# Patient Record
Sex: FEMALE | NOT HISPANIC OR LATINO | Employment: OTHER | URBAN - METROPOLITAN AREA
[De-identification: names, ages, dates, MRNs, and addresses within clinical notes are randomized per-mention and may not be internally consistent; named-entity substitution may affect disease eponyms.]

---

## 2022-08-15 PROBLEM — H04.129 DRY EYE SYNDROME: Status: ACTIVE | Noted: 2020-02-03

## 2022-08-15 PROBLEM — H25.13 AGE-RELATED NUCLEAR CATARACT OF BOTH EYES: Status: ACTIVE | Noted: 2020-02-03

## 2022-08-15 PROBLEM — M35.01 KERATOCONJUNCTIVITIS SICCA: Status: ACTIVE | Noted: 2020-02-03

## 2022-08-15 PROBLEM — H52.03 HYPEROPIA OF BOTH EYES: Status: ACTIVE | Noted: 2020-02-03

## 2022-08-15 PROBLEM — H16.229 KERATOCONJUNCTIVITIS SICCA: Status: ACTIVE | Noted: 2020-02-03

## 2022-08-15 PROBLEM — H52.4 PRESBYOPIA: Status: ACTIVE | Noted: 2020-02-03

## 2022-08-15 PROBLEM — E78.3 EXOGENOUS HYPERTRIGLYCERIDEMIA: Status: ACTIVE | Noted: 2017-06-05

## 2022-08-15 RX ORDER — CLOBETASOL PROPIONATE 0.5 MG/G
OINTMENT TOPICAL
COMMUNITY
End: 2022-08-18

## 2022-08-15 RX ORDER — LISINOPRIL 40 MG/1
1 TABLET ORAL DAILY
COMMUNITY
Start: 2022-06-27

## 2022-08-15 RX ORDER — IBUPROFEN 600 MG/1
600 TABLET ORAL EVERY 6 HOURS PRN
COMMUNITY
End: 2023-01-21

## 2022-08-15 RX ORDER — CHROMIUM 200 MCG
200 TABLET ORAL
COMMUNITY
End: 2022-08-18

## 2022-08-15 RX ORDER — ASPIRIN 81 MG/1
81 TABLET ORAL DAILY
COMMUNITY

## 2022-08-15 RX ORDER — ESTRADIOL ACETATE 0.05 MG/D
1 RING VAGINAL
COMMUNITY
Start: 2022-04-01

## 2022-08-15 RX ORDER — MELATONIN
1000 DAILY
COMMUNITY

## 2022-08-18 ENCOUNTER — OFFICE VISIT (OUTPATIENT)
Dept: SURGERY | Facility: CLINIC | Age: 62
End: 2022-08-18

## 2022-08-18 VITALS
SYSTOLIC BLOOD PRESSURE: 134 MMHG | HEART RATE: 75 BPM | OXYGEN SATURATION: 98 % | DIASTOLIC BLOOD PRESSURE: 86 MMHG | WEIGHT: 211.2 LBS | HEIGHT: 63 IN | TEMPERATURE: 98.4 F | BODY MASS INDEX: 37.42 KG/M2

## 2022-08-18 DIAGNOSIS — K64.9 HEMORRHOIDS, UNSPECIFIED HEMORRHOID TYPE: ICD-10-CM

## 2022-08-18 DIAGNOSIS — K64.4 ANAL SKIN TAG: Primary | ICD-10-CM

## 2022-08-18 DIAGNOSIS — Z12.11 ENCOUNTER FOR SCREENING COLONOSCOPY: ICD-10-CM

## 2022-08-18 PROCEDURE — 99204 OFFICE O/P NEW MOD 45 MIN: CPT | Performed by: PHYSICIAN ASSISTANT

## 2022-08-18 RX ORDER — ALBUTEROL SULFATE 90 UG/1
2 AEROSOL, METERED RESPIRATORY (INHALATION) EVERY 4 HOURS PRN
COMMUNITY
Start: 2022-06-03

## 2022-08-18 RX ORDER — HYDROCORTISONE 25 MG/G
CREAM TOPICAL
Qty: 30 G | Refills: 1 | Status: SHIPPED | OUTPATIENT
Start: 2022-08-18 | End: 2022-11-22

## 2022-08-18 RX ORDER — LANCETS 33 GAUGE
1 EACH MISCELLANEOUS DAILY
COMMUNITY
Start: 2022-06-07

## 2022-08-18 RX ORDER — BLOOD SUGAR DIAGNOSTIC
1 STRIP MISCELLANEOUS DAILY
COMMUNITY
Start: 2022-06-07

## 2022-08-18 NOTE — PROGRESS NOTES
Alisha Connolly is a 62 y.o. female who is seen as a self-referred consult for rectal pain.      HPI:  Started May 2021. Was seen at [facility], was prescribed diltiazem, which only burned and did not help. Tried that for a long time but kept being irritated. Pain is worse after BMs. Was told by another doctor to stop diltiazem and start calmoseptine, which helped. Metamucil didn't help; benefiber was better. Smooth move tea has been wonderful. Then calmoseptine started irritating. Now using vaseline after each BM and after showering. Has hemorrhoids from childbirth. Thinks she has an internal hemorrhoid on the right side. Has twinges of sharp pain. Sometimes radiates to vaginal area.  Skin tags irritate her and pull on extra tissue, which is uncomfortable. Symptoms have improved somewhat but have not resolved. She does not have regular bleeding. Has BM 1-2x/day. [place] 4. No family history of colon cancer or colon polyps. Her last colonoscopy was at least 10 years ago. No personal history of colon polyps.     Past Medical History:   Diagnosis Date   • Abnormal Pap smear of cervix    • Age-related nuclear cataract of both eyes 02/03/2020   • Anal fissure 10/2021   • Arthrodesis status 03/08/2014   • Asthma    • Benign essential hypertension 06/05/2015    Continue lisinopril   • Carpal tunnel syndrome    • Cervical spondylolysis    • Chronic neck pain 07/19/2016   • Cystitis 11/2017   • Dry eye syndrome 02/03/2020   • Environmental allergies    • Exogenous hypertriglyceridemia 06/05/2017   • Ganglion cyst of finger 01/2020   • Hormone replacement therapy (HRT)    • Hyperchylomicronemia    • Hyperlipidemia 06/05/2015    ASCVD 10-year risk ~10% Plan: · Start statin. Unclear whether arm numbness really 2/2 statin in the past.   • Hyperopia of both eyes 02/03/2020   • Keratoconjunctivitis sicca (HCC) 02/03/2020   • Lichen sclerosus    • Lung nodule 06/01/2006   • Obstructive sleep apnea syndrome 11/01/2004    Not using CPAP  due to eye issues   • Palpitations    • Pertussis 02/08/2016    [facility] ER   • PNA (pneumonia)    • Presbyopia 02/03/2020   • Rotator cuff injury 12/04/2011    SEEN AT [facility] ER   • Snoring    • Type 2 diabetes mellitus without complication (HCC) 12/21/2015    Under good control Plan: · Reassured about use of metformin   • Urinary urgency 06/2021       Past Surgical History:   Procedure Laterality Date   • ANTERIOR CERVICAL DISCECTOMY W/ FUSION N/A 03/08/2014    C6-7, DR. ALEXEY IZAGUIRRE AT [facility]   • CERVICAL SPINE SURGERY N/A 2003    TITANIUM PLATE   • COLONOSCOPY N/A 2021   • HYSTERECTOMY N/A 1997    WITH BSO, FOR HEAVY BLEEDING   • INCISIONAL BREAST BIOPSY Right 1980    BENIGN   • LAPAROSCOPIC LYSIS OF ADHESIONS N/A    • Z-PLASTY REPAIR         Social History:   reports that she quit smoking about 8 years ago. Her smoking use included cigarettes. She has a 1.13 pack-year smoking history. She has never used smokeless tobacco. She reports current alcohol use. She reports that she does not use drugs.      Marriage status:     Family History   Problem Relation Age of Onset   • Arthritis Mother    • Heart disease Mother    • Arrhythmia Mother    • Hyperlipidemia Father    • Diabetes Father    • Heart disease Father    • Coronary artery disease Father    • Diabetes Sister    • Diabetes Paternal Grandmother    • Cancer Cousin    • Breast cancer Cousin    • Cancer Maternal Great-Grandmother    • Ovarian cancer Maternal Great-Grandmother          Current Outpatient Medications:   •  albuterol sulfate  (90 Base) MCG/ACT inhaler, , Disp: , Rfl:   •  aspirin 81 MG EC tablet, Take 81 mg by mouth., Disp: , Rfl:   •  cholecalciferol (VITAMIN D3) 25 MCG (1000 UT) tablet, Take 1,000 Units by mouth Daily., Disp: , Rfl:   •  DHA-EPA-Vitamin E 192-251-11 MG-MG-UNIT capsule, Take 1 tablet by mouth., Disp: , Rfl:   •  Estradiol Acetate (Femring) 0.05 MG/24HR ring, Insert 1 each into the vagina., Disp: , Rfl:   •   ibuprofen (ADVIL,MOTRIN) 600 MG tablet, Take  by mouth., Disp: , Rfl:   •  Lancets (OneTouch Delica Plus [id number]) misc, 1 each by Other route Daily., Disp: , Rfl:   •  lisinopril (PRINIVIL,ZESTRIL) 40 MG tablet, Take 1 tablet by mouth Daily., Disp: , Rfl:   •  metFORMIN (GLUCOPHAGE) 500 MG tablet, Take 1 tablet by mouth 2 (Two) Times a Day With Meals., Disp: , Rfl:   •  OneTouch Ultra test strip, 1 each by Other route Daily., Disp: , Rfl:   •  Hydrocortisone, Perianal, (Anusol-HC) 2.5 % rectal cream, Apply to enlarged hemorrhoids 3 times daily during hemorrhoid flare.  Include applicator. Not for daily long term use., Disp: 30 g, Rfl: 1    Allergy  Penicillins; Atorvastatin; Rosuvastatin; Latex; and Tetanus toxoid, adsorbed    Vitals:    08/18/22 1423   BP: 134/86   Pulse: 75   Temp: 98.4 °F (36.9 °C)   SpO2: 98%   --  Body mass index is 37.41 kg/m².    Physical Exam  Enlarged external hemorrhoids with several skin tags  Mild to moderate pain on JASSI, no masses palpated    Assessment:  1. Anal skin tag    2. Encounter for screening colonoscopy    3. Hemorrhoids, unspecified hemorrhoid type        Plan:  For the hemorrhoids, I advised the patient to start a fiber supplement to a daily total intake of 30 to 40 g for stool consistency.  I provided detailed instructions.  I encouraged adequate water intake and MiraLAX as needed to keep stools soft.  I prescribed Anusol cream for hemorrhoids and gave instructions for use, including that cream is not for daily long-term use. Follow up in 4-6 weeks for reevaluation. Call with any questions, concerns, or worsening symptoms.    Discussed possibility of coexistent anal fissure along with hemorrhoids.     Schedule Colonoscopy, possible rubber banding, possible anal tag excision. Discussed risks, benefits, and alternatives.    I spent >45 minutes caring for this patient on this date of service. This time includes time spent by me performing any of the following activities:  preparing for the visit, reviewing tests, obtaining and/or reviewing a separately obtained history, performing a medically appropriate examination and/or evaluation, counseling and educating the patient/family/caregiver, ordering medications, tests, or procedures, referring and communicating with other health care professionals and independently interpreting results and communicating that information with the patient/family/caregiver.      Rosalind Richard PA-C  Physician Assistant  Colorectal Surgery

## 2022-09-19 ENCOUNTER — ANESTHESIA EVENT (OUTPATIENT)
Dept: GASTROENTEROLOGY | Facility: HOSPITAL | Age: 62
End: 2022-09-19

## 2022-09-19 ENCOUNTER — ANESTHESIA (OUTPATIENT)
Dept: GASTROENTEROLOGY | Facility: HOSPITAL | Age: 62
End: 2022-09-19

## 2022-09-19 ENCOUNTER — HOSPITAL ENCOUNTER (OUTPATIENT)
Facility: HOSPITAL | Age: 62
Setting detail: HOSPITAL OUTPATIENT SURGERY
Discharge: HOME | End: 2022-09-19
Attending: COLON & RECTAL SURGERY | Admitting: COLON & RECTAL SURGERY

## 2022-09-19 VITALS
TEMPERATURE: 98.1 F | DIASTOLIC BLOOD PRESSURE: 62 MMHG | BODY MASS INDEX: 34.49 KG/M2 | RESPIRATION RATE: 16 BRPM | SYSTOLIC BLOOD PRESSURE: 144 MMHG | HEART RATE: 79 BPM | OXYGEN SATURATION: 97 % | WEIGHT: 202 LBS | HEIGHT: 64 IN

## 2022-09-19 DIAGNOSIS — K64.4 ANAL SKIN TAG: ICD-10-CM

## 2022-09-19 DIAGNOSIS — K64.2 GRADE III HEMORRHOIDS: Primary | ICD-10-CM

## 2022-09-19 DIAGNOSIS — Z12.11 ENCOUNTER FOR SCREENING COLONOSCOPY: ICD-10-CM

## 2022-09-19 DIAGNOSIS — K64.9 HEMORRHOIDS, UNSPECIFIED HEMORRHOID TYPE: ICD-10-CM

## 2022-09-19 LAB — GLUCOSE BLDC GLUCOMTR-MCNC: 106 MG/DL (ref 70–130)

## 2022-09-19 PROCEDURE — 82962 GLUCOSE BLOOD TEST: CPT

## 2022-09-19 PROCEDURE — 45398 COLONOSCOPY W/BAND LIGATION: CPT | Performed by: COLON & RECTAL SURGERY

## 2022-09-19 PROCEDURE — 88304 TISSUE EXAM BY PATHOLOGIST: CPT | Performed by: COLON & RECTAL SURGERY

## 2022-09-19 PROCEDURE — 25010000002 PROPOFOL 10 MG/ML EMULSION

## 2022-09-19 PROCEDURE — 25010000002 KETOROLAC TROMETHAMINE PER 15 MG

## 2022-09-19 RX ORDER — LIDOCAINE HYDROCHLORIDE 10 MG/ML
0.5 INJECTION, SOLUTION INFILTRATION; PERINEURAL ONCE AS NEEDED
Status: DISCONTINUED | OUTPATIENT
Start: 2022-09-19 | End: 2022-09-19 | Stop reason: HOSPADM

## 2022-09-19 RX ORDER — KETOROLAC TROMETHAMINE 30 MG/ML
INJECTION, SOLUTION INTRAMUSCULAR; INTRAVENOUS AS NEEDED
Status: DISCONTINUED | OUTPATIENT
Start: 2022-09-19 | End: 2022-09-19 | Stop reason: SURG

## 2022-09-19 RX ORDER — TRAMADOL HYDROCHLORIDE 50 MG/1
50 TABLET ORAL EVERY 6 HOURS PRN
Qty: 20 TABLET | Refills: 0 | Status: SHIPPED | OUTPATIENT
Start: 2022-09-19 | End: 2022-11-22

## 2022-09-19 RX ORDER — PROPOFOL 10 MG/ML
VIAL (ML) INTRAVENOUS CONTINUOUS PRN
Status: DISCONTINUED | OUTPATIENT
Start: 2022-09-19 | End: 2022-09-19 | Stop reason: SURG

## 2022-09-19 RX ORDER — PROPOFOL 10 MG/ML
VIAL (ML) INTRAVENOUS AS NEEDED
Status: DISCONTINUED | OUTPATIENT
Start: 2022-09-19 | End: 2022-09-19 | Stop reason: SURG

## 2022-09-19 RX ORDER — BUPIVACAINE HYDROCHLORIDE AND EPINEPHRINE 5; 5 MG/ML; UG/ML
INJECTION, SOLUTION EPIDURAL; INTRACAUDAL; PERINEURAL AS NEEDED
Status: DISCONTINUED | OUTPATIENT
Start: 2022-09-19 | End: 2022-09-19 | Stop reason: HOSPADM

## 2022-09-19 RX ORDER — SODIUM CHLORIDE, SODIUM LACTATE, POTASSIUM CHLORIDE, CALCIUM CHLORIDE 600; 310; 30; 20 MG/100ML; MG/100ML; MG/100ML; MG/100ML
1000 INJECTION, SOLUTION INTRAVENOUS CONTINUOUS
Status: DISCONTINUED | OUTPATIENT
Start: 2022-09-19 | End: 2022-09-19 | Stop reason: HOSPADM

## 2022-09-19 RX ORDER — SODIUM CHLORIDE 0.9 % (FLUSH) 0.9 %
10 SYRINGE (ML) INJECTION AS NEEDED
Status: DISCONTINUED | OUTPATIENT
Start: 2022-09-19 | End: 2022-09-19 | Stop reason: HOSPADM

## 2022-09-19 RX ORDER — PROMETHAZINE HYDROCHLORIDE 25 MG/1
25 SUPPOSITORY RECTAL ONCE AS NEEDED
Status: DISCONTINUED | OUTPATIENT
Start: 2022-09-19 | End: 2022-09-19 | Stop reason: HOSPADM

## 2022-09-19 RX ORDER — PROMETHAZINE HYDROCHLORIDE 25 MG/1
25 TABLET ORAL ONCE AS NEEDED
Status: DISCONTINUED | OUTPATIENT
Start: 2022-09-19 | End: 2022-09-19 | Stop reason: HOSPADM

## 2022-09-19 RX ORDER — LIDOCAINE HYDROCHLORIDE 20 MG/ML
INJECTION, SOLUTION INFILTRATION; PERINEURAL AS NEEDED
Status: DISCONTINUED | OUTPATIENT
Start: 2022-09-19 | End: 2022-09-19 | Stop reason: SURG

## 2022-09-19 RX ADMIN — PROPOFOL 50 MG: 10 INJECTION, EMULSION INTRAVENOUS at 12:56

## 2022-09-19 RX ADMIN — LIDOCAINE HYDROCHLORIDE 50 MG: 20 INJECTION, SOLUTION INFILTRATION; PERINEURAL at 12:56

## 2022-09-19 RX ADMIN — Medication 200 MCG/KG/MIN: at 12:58

## 2022-09-19 RX ADMIN — KETOROLAC TROMETHAMINE 30 MG: 30 INJECTION, SOLUTION INTRAMUSCULAR; INTRAVENOUS at 13:11

## 2022-09-19 RX ADMIN — SODIUM CHLORIDE, POTASSIUM CHLORIDE, SODIUM LACTATE AND CALCIUM CHLORIDE 1000 ML: 600; 310; 30; 20 INJECTION, SOLUTION INTRAVENOUS at 12:12

## 2022-09-19 NOTE — ANESTHESIA POSTPROCEDURE EVALUATION
"Patient: Alisha Connolly    Procedure Summary     Date: 09/19/22 Room / Location: [facility] ENDOSCOPY 1 / [facility] ENDOSCOPY    Anesthesia Start: 1251 Anesthesia Stop: 1331    Procedures:       HEMORRHOID BANDING x3 (N/A Rectum)      COLONOSCOPY to cecum (N/A )      ANAL TAG EXCISION (N/A Anus) Diagnosis:       Anal skin tag      Encounter for screening colonoscopy      Hemorrhoids, unspecified hemorrhoid type      (Anal skin tag [K64.4])      (Encounter for screening colonoscopy [Z12.11])      (Hemorrhoids, unspecified hemorrhoid type [K64.9])    Surgeons: Sarai Perez MD Provider: Mook Ch MD    Anesthesia Type: MAC ASA Status: 2          Anesthesia Type: MAC    Vitals  Vitals Value Taken Time   /68 09/19/22 1330   Temp     Pulse 81 09/19/22 1330   Resp 16 09/19/22 1330   SpO2 97 % 09/19/22 1330           Post Anesthesia Care and Evaluation    Patient location during evaluation: bedside  Patient participation: complete - patient participated  Level of consciousness: awake and alert  Pain management: adequate    Airway patency: patent  Anesthetic complications: No anesthetic complications    Cardiovascular status: acceptable  Respiratory status: acceptable  Hydration status: acceptable    Comments: /68   Pulse 81   Temp 36.7 °C (98.1 °F) (Oral)   Resp 16   Ht 162.6 cm (64\")   Wt 91.6 kg (202 lb)   SpO2 97%   BMI 34.67 kg/m²     Patient is stable postoperatively and has adequately recovered from anesthesia as described above unless otherwise noted      "

## 2022-09-19 NOTE — ANESTHESIA PREPROCEDURE EVALUATION
Anesthesia Evaluation     Patient summary reviewed and Nursing notes reviewed   no history of anesthetic complications:               Airway   Mallampati: II  TM distance: >3 FB  Neck ROM: full  no difficulty expected  Dental - normal exam     Pulmonary     breath sounds clear to auscultation  (+) asthma,sleep apnea,   (-) shortness of breath, decreased breath sounds, wheezes  Cardiovascular - normal exam  Exercise tolerance: good (4-7 METS)    Rhythm: regular  Rate: normal    (+) hypertension, hyperlipidemia,   (-) past MI, angina, CHF, orthopnea, PND, MA, PVD      Neuro/Psych- negative ROS  (-) seizures, neuromuscular disease, TIA, CVA, dizziness/light headedness, weakness, numbness  GI/Hepatic/Renal/Endo    (+)   diabetes mellitus type 2,   (-) liver disease    Musculoskeletal     (+) neck pain,   Abdominal  - normal exam   Substance History - negative use  (-) alcohol use, drug use     OB/GYN negative ob/gyn ROS         Other - negative ROS                       Anesthesia Plan    ASA 2     MAC     (D/W pt. MAC and possible awareness intra op.  Pt understands MAC and GA are not the same and the possibility of GA being required for failed MAC)  intravenous induction     Anesthetic plan, risks, benefits, and alternatives have been provided, discussed and informed consent has been obtained with: patient.        CODE STATUS:

## 2022-09-21 LAB
LAB AP CASE REPORT: NORMAL
PATH REPORT.FINAL DX SPEC: NORMAL
PATH REPORT.GROSS SPEC: NORMAL

## 2022-09-22 ENCOUNTER — TELEPHONE (OUTPATIENT)
Dept: SURGERY | Facility: CLINIC | Age: 62
End: 2022-09-22

## 2022-09-22 RX ORDER — LIDOCAINE 50 MG/G
1 OINTMENT TOPICAL EVERY 4 HOURS PRN
Qty: 30 G | Refills: 4 | Status: SHIPPED | OUTPATIENT
Start: 2022-09-22 | End: 2022-10-22

## 2022-09-22 NOTE — TELEPHONE ENCOUNTER
YESSYI -    Phoned patient and scheduled a post-op appt. I called the pharmacy ([facility]) & spoke with Deuce the pharmacy tech and asked to cancel the lidocaine ointment, she states that it burns her and irritates her skin, another doctor that was helping her with a fissure had told her to discontinue it, she was happy with pathology report.  Patient voiced understanding.

## 2022-09-22 NOTE — TELEPHONE ENCOUNTER
Patient phoned office asking if she should have a post-op appt?    CY, hemorrhoid banding, & 2 skin tag removals.    Can she use Tucks, she is still sore and bleeding (spots) back there, probably from the two skin tags that was removed.    She would like for you to call her to give pathology report.  Dx: Skin, Anus, Excision:  A: Hypertrophic anal papillae with fibrosis and repair.      Contact Cell #[phone].

## 2022-09-23 DIAGNOSIS — K64.2 GRADE III HEMORRHOIDS: ICD-10-CM

## 2022-09-23 DIAGNOSIS — K64.9 HEMORRHOIDS, UNSPECIFIED HEMORRHOID TYPE: ICD-10-CM

## 2022-09-23 RX ORDER — TRAMADOL HYDROCHLORIDE 50 MG/1
TABLET ORAL
Qty: 20 TABLET | Refills: 0 | OUTPATIENT
Start: 2022-09-23

## 2022-09-23 NOTE — TELEPHONE ENCOUNTER
Yes, has 4 left. Britni suggested extra strength Tylenol OTC. She states has started sitz bath. Patient voiced understanding.

## 2022-09-27 ENCOUNTER — TELEPHONE (OUTPATIENT)
Dept: SURGERY | Facility: CLINIC | Age: 62
End: 2022-09-27

## 2022-09-27 NOTE — TELEPHONE ENCOUNTER
Caller: Alisha Connolly    Relationship to patient:     Best call back number: [phone]    Patient is needing:  EXTREME PAIN WHEN PATIENT SITS, FEELS RAW AND SORE, SHE ALSO FEELS SOMETHING IS IN THERE .  PATIENT WOULD LIKE TO KNOW IF SHE CAN COME IN SOONER THAN 10.03.22 POST OP APPOINTMENT?

## 2022-09-27 NOTE — TELEPHONE ENCOUNTER
Spoke to pt, said she has a hemorrhoid on the outside. When she sits it feels like that hemorrhoid is poking on the inside. D/C and bleeding has subsided. Doing sitz baths, unable to use lidocaine because it burns,and  taking extra strength tylenol. Explained to pt that this is most likely swelling externally that is pushing against the internal hemorrhoid that is banded. Explained hemorrhoids and banding to pt. Told pt we did not have any available appts before Monday but would call her if we had a cancellation and that she was welcome to go to ER if she felt like she was unable to wait until Monday.  Also suggested to pt to alternate ibuprofen with the tylenol, use calmoseptine, and ice for comfort. Pt understood.

## 2022-10-03 ENCOUNTER — OFFICE VISIT (OUTPATIENT)
Dept: SURGERY | Facility: CLINIC | Age: 62
End: 2022-10-03

## 2022-10-03 VITALS
WEIGHT: 205.3 LBS | SYSTOLIC BLOOD PRESSURE: 140 MMHG | TEMPERATURE: 97.6 F | BODY MASS INDEX: 36.38 KG/M2 | HEART RATE: 72 BPM | HEIGHT: 63 IN | OXYGEN SATURATION: 96 % | DIASTOLIC BLOOD PRESSURE: 78 MMHG

## 2022-10-03 DIAGNOSIS — K64.2 GRADE III HEMORRHOIDS: Primary | ICD-10-CM

## 2022-10-03 DIAGNOSIS — K64.4 ANAL SKIN TAG: ICD-10-CM

## 2022-10-03 PROCEDURE — 99024 POSTOP FOLLOW-UP VISIT: CPT | Performed by: PHYSICIAN ASSISTANT

## 2022-10-03 NOTE — PROGRESS NOTES
"Alisha Connolly is a 62 y.o. female in for follow up of Grade III hemorrhoids    Anal skin tag    Pt is S/P skin tag removal + colonoscopy with RBL on 09/19/2022 and presents today for a follow up visit.   She experienced spotty bleeding x1 week and mucous drainage following her procedure, but has since resolved.   She has an occasional twinge of pain in the rectum, but overall feels pain has improved since undergoing the RBL.   Pt notes occasional left-sided perianal swelling, but denies any current symptoms.   Regular BMs since drinking Smooth Move Tea.  She denies fevers or any other concerns at this time.     /78 (BP Location: Left arm, Patient Position: Sitting, Cuff Size: Large Adult)   Pulse 72   Temp 97.6 °F (36.4 °C)   Ht 160 cm (63\")   Wt 93.1 kg (205 lb 4.8 oz)   SpO2 96%   Breastfeeding No   BMI 36.37 kg/m²   Body mass index is 36.37 kg/m².      PE:  Physical Exam  Constitutional:       General: She is not in acute distress.     Appearance: She is well-developed.   HENT:      Head: Normocephalic and atraumatic.   Abdominal:      General: There is no distension.      Palpations: Abdomen is soft.   Genitourinary:     Comments: Perianal exam: Healed perianal incisions. Anal skin tag to the left. No enlargement of external hemorrhoids.   Musculoskeletal:         General: Normal range of motion.   Neurological:      Mental Status: She is alert.   Psychiatric:         Thought Content: Thought content normal.       Review of Medical Record:    Colonoscopy 09/19/2022:  - External & Internal Hemorrhoids. S/p RBL x3.  - Anal Skin Tag. Removed. Path: Hyperplastic Anal Papillae with Fibrois   - Repeat: 10 Years  - Dr. Sarai Perez, [facility]    Assessment:   1. Grade III hemorrhoids    2. Anal skin tag    - S/P skin tag removal + colonoscopy with RBL on 09/19/2022.  - Path: Hyperplastic Anal Papillae with Fibrois     Plan:  - Pt recovering well since her procedure.   - Apply Anusol PRN for external hemorrhoid " enlargement   - Follow up PRN for any new or worsening symptoms.

## 2022-10-19 NOTE — OP NOTE
Surgeon: Sarai Perez MD        Preoperative diagnosis: Anal skin tag [K64.4]  Encounter for screening colonoscopy [Z12.11]  Hemorrhoids, unspecified hemorrhoid type [K64.9]    Post-Op Diagnosis Codes:     * Anal skin tag [K64.4]     * Encounter for screening colonoscopy [Z12.11]     * Hemorrhoids, unspecified hemorrhoid type [K64.9]    Procedure:   ANAL TAG EXCISION, * Panel 2 does not exist *    Estimated Blood Loss: minimal    Specimens:   Specimens     ID Source Type Tests Collected By Collected At Frozen?    A Anus Tissue · TISSUE PATHOLOGY EXAM   Sarai Perez MD 9/19/22 1318 No    Description: bx         Order Name Source Comment Collection Info Order Time   TISSUE PATHOLOGY EXAM Anus  Collected By: Sarai Perez MD 9/19/2022  1:18 PM     Indication:  Alisha Connolly is a 62 y.o. female who comes in with anal tag  Patient understands risks, benefits,and alternatives wishes to proceed.      Procedure Details:  Procedure: excision of anal tag    Patient gave informed consent verbally.  Patient was prepped with Betadine.  1% lidocaine with epinephrine was used as a local infiltration.  I made an elliptical incision around the tag using a scalpel.  I then removed the tag at the base with a scalpel.  I used silver nitrate to control any bleeding.  I closed the incision in a running fashion with a 2-0 Vicryl.    Patient tolerated well.

## 2022-11-22 ENCOUNTER — OFFICE VISIT (OUTPATIENT)
Dept: FAMILY MEDICINE CLINIC | Facility: CLINIC | Age: 62
End: 2022-11-22

## 2022-11-22 VITALS
DIASTOLIC BLOOD PRESSURE: 76 MMHG | HEART RATE: 77 BPM | TEMPERATURE: 97.1 F | HEIGHT: 63 IN | BODY MASS INDEX: 36.5 KG/M2 | SYSTOLIC BLOOD PRESSURE: 138 MMHG | WEIGHT: 206 LBS | OXYGEN SATURATION: 97 %

## 2022-11-22 DIAGNOSIS — E11.9 TYPE 2 DIABETES MELLITUS WITHOUT COMPLICATION, WITHOUT LONG-TERM CURRENT USE OF INSULIN: Primary | ICD-10-CM

## 2022-11-22 DIAGNOSIS — I10 BENIGN ESSENTIAL HYPERTENSION: ICD-10-CM

## 2022-11-22 DIAGNOSIS — E78.2 MIXED HYPERLIPIDEMIA: ICD-10-CM

## 2022-11-22 PROCEDURE — 99204 OFFICE O/P NEW MOD 45 MIN: CPT | Performed by: NURSE PRACTITIONER

## 2022-11-23 LAB
ALBUMIN SERPL-MCNC: 4.3 G/DL (ref 3.5–5.2)
ALBUMIN/CREAT UR: <2 MG/G CREAT (ref 0–29)
ALBUMIN/GLOB SERPL: 1.9 G/DL
ALP SERPL-CCNC: 41 U/L (ref 39–117)
ALT SERPL-CCNC: 13 U/L (ref 1–33)
AST SERPL-CCNC: 17 U/L (ref 1–32)
BASOPHILS # BLD AUTO: 0.09 10*3/MM3 (ref 0–0.2)
BASOPHILS NFR BLD AUTO: 1.2 % (ref 0–1.5)
BILIRUB SERPL-MCNC: 0.4 MG/DL (ref 0–1.2)
BUN SERPL-MCNC: 12 MG/DL (ref 8–23)
BUN/CREAT SERPL: 14.6 (ref 7–25)
CALCIUM SERPL-MCNC: 9.5 MG/DL (ref 8.6–10.5)
CHLORIDE SERPL-SCNC: 104 MMOL/L (ref 98–107)
CHOLEST SERPL-MCNC: 242 MG/DL (ref 0–200)
CO2 SERPL-SCNC: 26.4 MMOL/L (ref 22–29)
CREAT SERPL-MCNC: 0.82 MG/DL (ref 0.57–1)
CREAT UR-MCNC: 129.8 MG/DL
EGFRCR SERPLBLD CKD-EPI 2021: 80 ML/MIN/1.73
EOSINOPHIL # BLD AUTO: 0.17 10*3/MM3 (ref 0–0.4)
EOSINOPHIL NFR BLD AUTO: 2.3 % (ref 0.3–6.2)
ERYTHROCYTE [DISTWIDTH] IN BLOOD BY AUTOMATED COUNT: 13.4 % (ref 12.3–15.4)
GLOBULIN SER CALC-MCNC: 2.3 GM/DL
GLUCOSE SERPL-MCNC: 108 MG/DL (ref 65–99)
HBA1C MFR BLD: 5.7 % (ref 4.8–5.6)
HCT VFR BLD AUTO: 38.7 % (ref 34–46.6)
HDLC SERPL-MCNC: 64 MG/DL (ref 40–60)
HGB BLD-MCNC: 12.8 G/DL (ref 12–15.9)
IMM GRANULOCYTES # BLD AUTO: 0.03 10*3/MM3 (ref 0–0.05)
IMM GRANULOCYTES NFR BLD AUTO: 0.4 % (ref 0–0.5)
LDLC SERPL CALC-MCNC: 139 MG/DL (ref 0–100)
LDLC/HDLC SERPL: 2.1 {RATIO}
LYMPHOCYTES # BLD AUTO: 3.06 10*3/MM3 (ref 0.7–3.1)
LYMPHOCYTES NFR BLD AUTO: 40.5 % (ref 19.6–45.3)
MCH RBC QN AUTO: 30 PG (ref 26.6–33)
MCHC RBC AUTO-ENTMCNC: 33.1 G/DL (ref 31.5–35.7)
MCV RBC AUTO: 90.8 FL (ref 79–97)
MICROALBUMIN UR-MCNC: <3 UG/ML
MONOCYTES # BLD AUTO: 0.64 10*3/MM3 (ref 0.1–0.9)
MONOCYTES NFR BLD AUTO: 8.5 % (ref 5–12)
NEUTROPHILS # BLD AUTO: 3.56 10*3/MM3 (ref 1.7–7)
NEUTROPHILS NFR BLD AUTO: 47.1 % (ref 42.7–76)
NRBC BLD AUTO-RTO: 0 /100 WBC (ref 0–0.2)
PLATELET # BLD AUTO: 315 10*3/MM3 (ref 140–450)
POTASSIUM SERPL-SCNC: 4.7 MMOL/L (ref 3.5–5.2)
PROT SERPL-MCNC: 6.6 G/DL (ref 6–8.5)
RBC # BLD AUTO: 4.26 10*6/MM3 (ref 3.77–5.28)
SODIUM SERPL-SCNC: 138 MMOL/L (ref 136–145)
TRIGL SERPL-MCNC: 218 MG/DL (ref 0–150)
TSH SERPL DL<=0.005 MIU/L-ACNC: 2.15 UIU/ML (ref 0.27–4.2)
VLDLC SERPL CALC-MCNC: 39 MG/DL (ref 5–40)
WBC # BLD AUTO: 7.55 10*3/MM3 (ref 3.4–10.8)

## 2023-01-16 ENCOUNTER — OFFICE VISIT (OUTPATIENT)
Dept: SURGERY | Facility: CLINIC | Age: 63
End: 2023-01-16
Payer: MEDICARE

## 2023-01-16 VITALS
HEART RATE: 94 BPM | WEIGHT: 206.1 LBS | DIASTOLIC BLOOD PRESSURE: 70 MMHG | HEIGHT: 63 IN | BODY MASS INDEX: 36.52 KG/M2 | TEMPERATURE: 97.3 F | OXYGEN SATURATION: 98 % | SYSTOLIC BLOOD PRESSURE: 124 MMHG

## 2023-01-16 DIAGNOSIS — K60.2 ANAL FISSURE: ICD-10-CM

## 2023-01-16 DIAGNOSIS — K64.4 ANAL SKIN TAG: Primary | ICD-10-CM

## 2023-01-16 PROBLEM — S82.64XA CLOSED NONDISPLACED FRACTURE OF LATERAL MALLEOLUS OF RIGHT FIBULA: Status: ACTIVE | Noted: 2022-12-14

## 2023-01-16 PROCEDURE — 99215 OFFICE O/P EST HI 40 MIN: CPT | Performed by: PHYSICIAN ASSISTANT

## 2023-01-16 RX ORDER — CLINDAMYCIN PHOSPHATE 900 MG/50ML
900 INJECTION INTRAVENOUS ONCE
Status: CANCELLED | OUTPATIENT
Start: 2023-02-09 | End: 2023-01-16

## 2023-01-16 NOTE — PROGRESS NOTES
"Alisha Connolly is a 63 y.o. female in for follow up of hemorrhoids. She underwent colonoscopy, hemorrhoid banding x3, anal tag excision on 9/19/22. She was seen postoperatively on 10/3/22 by Britni Spears PA-C, was doing well, and was recommended to follow up as needed.    Today Ms. Connolly reports ongoing severe anal pain as well as bleeding. She believes that one anal tag was excised at the time of her colonoscopy last September, but one tag remains and is causing symptoms. Her symptoms are better now compared to before the anal tag excision in September, but they are severe. She fractured her ankle recently, so that has kept her busy, which is why she has not been back in to our office until now. Symptoms are exacerbated with BMs. Has some discomfort when sitting. Tries to push in but keeps coming out. Has a tag/flap, hard to clean, gets sore. When bleeds, bleeds a lot--2-3 times in the last couple of weeks. Lidocaine and hydrocortisone both sting and irritate the area. Drinks smooth move tea every night; she states this has worked better than fiber, miralax, and stool softeners. Has BM daily. [place] 5-6. Patient has tried diltiazem cream for fissures in the past without relief.     /70 (BP Location: Left arm, Patient Position: Sitting, Cuff Size: Large Adult)   Pulse 94   Temp 97.3 °F (36.3 °C) (Temporal)   Ht 160 cm (63\")   Wt 93.5 kg (206 lb 1.6 oz)   LMP  (LMP Unknown)   SpO2 98%   Breastfeeding No   BMI 36.51 kg/m²   Body mass index is 36.51 kg/m².  -  Physical Exam  No acute distress  Chaperone present  Perianal exam: anal tag in posterior position just left of midline. In midline posterior position is what looks like a small visible portion of a fissure. JASSI and anoscopy not performed due to pain.     Assessment:   1. Anal skin tag    2. Anal fissure         Plan:  • Discussed options of excision of remaining anal tag, as well as Botox for anal fissure. Discussed risk of temporary fecal " incontinence with Botox. Discussed option of nitroglycerin cream with common adverse effect of headaches.   • Schedule exam under anesthesia with possible chemical sphincterotomy with Botox, possible excision of anal tag. Discussed with patient that we may need to have a more thorough exam before we are able to determine the best course of action. Discussed with her that the tag may be too close to the fissure to be able to excise at time of surgery. Discussed postoperative pain expected with this procedure. Patient verbalizes understanding and wishes to proceed.    Time Spent: I spent 55 minutes caring for Alisha on this date of service. This time includes time spent by me in the following activities: preparing for the visit, performing a medically appropriate examination and/or evaluation, counseling and educating the patient/family/caregiver, ordering medications, tests, or procedures, referring and communicating with other health care professionals, documenting information in the medical record and care coordination.     Rosalind Richard PA-C  Physician Assistant  Colorectal Surgery

## 2023-01-31 ENCOUNTER — OFFICE VISIT (OUTPATIENT)
Dept: FAMILY MEDICINE CLINIC | Facility: CLINIC | Age: 63
End: 2023-01-31
Payer: MEDICARE

## 2023-01-31 VITALS
HEIGHT: 63 IN | OXYGEN SATURATION: 97 % | DIASTOLIC BLOOD PRESSURE: 73 MMHG | BODY MASS INDEX: 36.31 KG/M2 | HEART RATE: 79 BPM | TEMPERATURE: 97.5 F | SYSTOLIC BLOOD PRESSURE: 137 MMHG

## 2023-01-31 DIAGNOSIS — M25.562 ACUTE PAIN OF LEFT KNEE: Primary | ICD-10-CM

## 2023-01-31 PROCEDURE — 99213 OFFICE O/P EST LOW 20 MIN: CPT | Performed by: NURSE PRACTITIONER

## 2023-01-31 NOTE — PROGRESS NOTES
"Chief Complaint  Knee Pain (C/o L knee had popped and had some swelling, still swollen, not getting better )    Subjective        Alisha Connolly presents to [facility]  History of Present Illness   Got boot cast off recently.  Recently, 1/20/23 after she was going up steps she heard a loud pop and developed sharp pain. Went to  on 1/21/23 and had XR.  Now has tingling and cold feeling inside from mid thigh to ankle. Could not bear weight when it happened.  Now can bear min weight, but painful.  Using crutch only on right. She has been using ice, resting, NSAIDs with min relief.  Although not iced x 2 days.      This occurred just after she got boot off right ankle for separate problem-[place]       Objective   Vital Signs:  /73   Pulse 79   Temp 97.5 °F (36.4 °C)   Ht 160 cm (63\")   SpO2 97%   BMI 36.31 kg/m²   Estimated body mass index is 36.31 kg/m² as calculated from the following:    Height as of this encounter: 160 cm (63\").    Weight as of 1/21/23: 93 kg (205 lb).             Physical Exam  Vitals and nursing note reviewed.   Constitutional:       General: She is not in acute distress.     Appearance: She is well-developed. She is not ill-appearing or diaphoretic.      Comments: Well dressed, well appearing   HENT:      Head: Normocephalic and atraumatic.   Eyes:      General:         Right eye: No discharge.         Left eye: No discharge.      Conjunctiva/sclera: Conjunctivae normal.   Cardiovascular:      Rate and Rhythm: Normal rate and regular rhythm.      Heart sounds: Normal heart sounds.   Pulmonary:      Effort: Pulmonary effort is normal.      Breath sounds: Normal breath sounds.   Abdominal:      General: Bowel sounds are normal.      Palpations: Abdomen is soft.      Tenderness: There is no abdominal tenderness.   Musculoskeletal:         General: No deformity.      Left knee: Swelling present. Decreased range of motion. Tenderness " present.      Comments: crutches   Skin:     General: Skin is warm and dry.   Neurological:      Mental Status: She is alert and oriented to person, place, and time.        Result Review :                   Assessment and Plan   Diagnoses and all orders for this visit:    1. Acute pain of left knee (Primary)  -     Ambulatory Referral to Orthopedic Surgery    Dx with left knee sprain-having some neuropathic sx which may be related to swelling.  Urgent referral in for ortho and pt will be seen tomorrow by ortho.  In the meantime, limited weight bearing, should rest, elevate, use ICE frequently, NSAIDs with food prn.  We discussed expected duration for pain if sprain, but will defer to ortho eval for duration, prognosis.  Reviewed XR done by UC which did not show obvious deformity.          Follow Up   Return if symptoms worsen or fail to improve.  Patient was given instructions and counseling regarding her condition or for health maintenance advice. Please see specific information pulled into the AVS if appropriate.

## 2023-02-01 ENCOUNTER — OFFICE VISIT (OUTPATIENT)
Dept: ORTHOPEDIC SURGERY | Facility: CLINIC | Age: 63
End: 2023-02-01
Payer: MEDICARE

## 2023-02-01 VITALS — BODY MASS INDEX: 36.32 KG/M2 | HEIGHT: 63 IN | TEMPERATURE: 98.2 F | WEIGHT: 205 LBS

## 2023-02-01 DIAGNOSIS — M25.462 EFFUSION OF LEFT KNEE: Primary | ICD-10-CM

## 2023-02-01 DIAGNOSIS — M25.362 KNEE INSTABILITY, LEFT: ICD-10-CM

## 2023-02-01 DIAGNOSIS — M25.562 ACUTE PAIN OF LEFT KNEE: ICD-10-CM

## 2023-02-01 PROCEDURE — 99214 OFFICE O/P EST MOD 30 MIN: CPT | Performed by: NURSE PRACTITIONER

## 2023-02-01 NOTE — PROGRESS NOTES
Patient Name: Alisha Connolly   YOB: 1960  Referring Primary Care Physician: Aarti Ogden, KANWAL  BMI: Body mass index is 36.31 kg/m².    Chief Complaint:    Chief Complaint   Patient presents with   • Left Knee - Pain        HPI: New pt to me that had a fall in Dec and broke her ankle and was in a cam boot and was [place] in [place]. Pt had cam boot removed Jan 6 and she has been having pain in her left knee. Pt was going up steps and had loud pop in knee a couple weeks ago and can not weight bear. She went to [facility] and was sent here. Pt is taking aleve and has no hx of prior injury knee.  Pt has pain weight bearing and presents on crutch.   Alisha Connolly is a 63 y.o. female who presents today for evaluation of   Chief Complaint   Patient presents with   • Left Knee - Pain         Subjective   Medications:   Home Medications:  Current Outpatient Medications on File Prior to Visit   Medication Sig   • albuterol sulfate  (90 Base) MCG/ACT inhaler Inhale 2 puffs Every 4 (Four) Hours As Needed.   • aspirin 81 MG EC tablet Take 81 mg by mouth Daily.   • cholecalciferol (VITAMIN D3) 25 MCG (1000 UT) tablet Take 1,000 Units by mouth Daily.   • DHA-EPA-Vitamin E 192-251-11 MG-MG-UNIT capsule Take 1 tablet by mouth.   • Estradiol Acetate (Femring) 0.05 MG/24HR ring Insert 1 each into the vagina.   • Lancets (OneTouch Delica Plus [id number]) misc 1 each by Other route Daily.   • lisinopril (PRINIVIL,ZESTRIL) 40 MG tablet Take 1 tablet by mouth Daily.   • metFORMIN (GLUCOPHAGE) 500 MG tablet Take 1 tablet by mouth 2 (Two) Times a Day With Meals.   • naproxen (Naprosyn) 500 MG tablet Take 1 tablet by mouth 2 (Two) Times a Day With Meals.   • OneTouch Ultra test strip 1 each by Other route Daily.     No current facility-administered medications on file prior to visit.     Current Medications:  Scheduled Meds:  Continuous Infusions:No current facility-administered medications for this visit.    PRN Meds:.    I have  reviewed the patient's medical history in detail and updated the computerized patient record.  Review and summarization of old records includes:    Past Medical History:   Diagnosis Date   • Abnormal Pap smear of cervix    • Age-related nuclear cataract of both eyes 02/03/2020   • Anal fissure 10/2021   • Anal skin tag 08/2022    S/P EXCISION   • Arthrodesis status 03/08/2014   • Asthma    • Benign essential hypertension 06/05/2015    Continue lisinopril   • Carpal tunnel syndrome    • Cervical spondylolysis    • Chronic neck pain 07/19/2016   • Closed nondisplaced fracture of lateral malleolus of right fibula 12/10/2022    NO SURGERY, SEEN AT [facility]   • Cystitis 11/2017   • Dry eye syndrome 02/03/2020   • Environmental allergies    • Exogenous hypertriglyceridemia 06/05/2017   • Ganglion cyst of finger 01/2020   • Hemorrhoids    • Hormone replacement therapy (HRT)    • Hyperchylomicronemia    • Hyperlipidemia 06/05/2015    ASCVD 10-year risk ~10% Plan: · Start statin. Unclear whether arm numbness really 2/2 statin in the past.   • Hyperopia of both eyes 02/03/2020   • Keratoconjunctivitis sicca (HCC) 02/03/2020   • Lichen sclerosus    • Lung nodule 06/01/2006   • Obstructive sleep apnea syndrome 11/01/2004    Not using CPAP due to eye issues   • Palpitations    • Pertussis 02/08/2016    [facility] ER   • PNA (pneumonia)    • Presbyopia 02/03/2020   • Rotator cuff injury 12/04/2011    SEEN AT [facility] ER   • Snoring    • Type 2 diabetes mellitus without complication (HCC) 12/21/2015    Under good control Plan: · Reassured about use of metformin   • Urinary urgency 06/2021        Past Surgical History:   Procedure Laterality Date   • ANTERIOR CERVICAL DISCECTOMY W/ FUSION N/A 03/08/2014    C6-7, DR. ALEXEY IZAGUIRRE AT [facility]   • ANUS SURGERY N/A 09/19/2022    Procedure: ANAL TAG EXCISION;  Surgeon: Sarai Perez MD;  Location: [facility];  Service: General;  Laterality: N/A;  pre-anal tag  post-same   • CERVICAL  SPINE SURGERY N/A     TITANIUM PLATE   • COLONOSCOPY N/A    • COLONOSCOPY N/A 2022    EXTERNAL AND INTERNAL HEMORRHOIDS, GRADE 3-BANDED, RESCOPE IN 10 YRS, DR. SARAI SANTIAGO AT [facility]   • HEMORRHOIDECTOMY N/A 2022    Procedure: HEMORRHOID BANDING x3;  Surgeon: Sarai Santiago MD;  Location: [facility] ENDOSCOPY;  Service: General;  Laterality: N/A;  pre-hemorrhoids  post-same   • HYSTERECTOMY N/A     WITH BSO, FOR HEAVY BLEEDING   • INCISIONAL BREAST BIOPSY Right     BENIGN   • LAPAROSCOPIC LYSIS OF ADHESIONS N/A    • Z-PLASTY REPAIR          Social History     Occupational History   • Not on file   Tobacco Use   • Smoking status: Former     Packs/day: 0.25     Years: 4.50     Pack years: 1.13     Types: Cigarettes     Quit date: 3/7/2014     Years since quittin.9     Passive exposure: Past   • Smokeless tobacco: Never   Vaping Use   • Vaping Use: Never used   Substance and Sexual Activity   • Alcohol use: Yes     Comment: occ   • Drug use: Never   • Sexual activity: Yes     Partners: Male     Birth control/protection: Post-menopausal, Hysterectomy      Social History     Social History Narrative   • Not on file        Family History   Problem Relation Age of Onset   • Arthritis Mother    • Heart disease Mother    • Arrhythmia Mother    • Hyperlipidemia Father    • Diabetes Father    • Heart disease Father    • Coronary artery disease Father    • Diabetes Sister    • Diabetes Paternal Grandmother    • Cancer Cousin    • Breast cancer Cousin    • Cancer Maternal Great-Grandmother    • Ovarian cancer Maternal Great-Grandmother    • Malig Hyperthermia Neg Hx        ROS: 14 point review of systems was performed and all other systems were reviewed and are negative except for documented findings in HPI and today's encounter.     Allergies:   Allergies   Allergen Reactions   • Penicillins Hives   • Atorvastatin Paresthesia     Arm numbness           • Rosuvastatin Paresthesia     Arm numbness     •  "Latex Unknown - Low Severity   • Tetanus Toxoid, Adsorbed Rash     Constitutional:  Denies fever, shaking or chills   Eyes:  Denies change in visual acuity   HENT:  Denies nasal congestion or sore throat   Respiratory:  Denies cough or shortness of breath   Cardiovascular:  Denies chest pain or severe LE edema   GI:  Denies abdominal pain, nausea, vomiting, bloody stools or diarrhea   Musculoskeletal:  Numbness, tingling, pain, or loss of motor function only as noted above in history of present illness.  : Denies painful urination or hematuria  Integument:  Denies rash, lesion or ulceration   Neurologic:  Denies headache or focal weakness  Endocrine:  Denies lymphadenopathy  Psych:  Denies confusion or change in mental status   Hem:  Denies active bleeding    OBJECTIVE:  Physical Exam: 63 y.o. female  Wt Readings from Last 3 Encounters:   02/01/23 93 kg (205 lb)   01/21/23 93 kg (205 lb)   01/16/23 93.5 kg (206 lb 1.6 oz)     Ht Readings from Last 1 Encounters:   02/01/23 160 cm (63\")     Body mass index is 36.31 kg/m².  Vitals:    02/01/23 1043   Temp: 98.2 °F (36.8 °C)     Vital signs reviewed.     General Appearance:    Alert, cooperative, in no acute distress                  Eyes: conjunctiva clear  ENT: external ears and nose atraumatic  CV: no peripheral edema  Resp: normal respiratory effort  Skin: no rashes or wounds; normal turgor  Psych: mood and affect appropriate  Lymph: no nodes appreciated  Neuro: gross sensation intact  Vascular:  Palpable peripheral pulse in noted extremity  Musculoskeletal Extremities: left knee ttp medial aspect with effusion and pain with ambulation - pt can not weight bear fully and has slight flexion contracture, extensor mechanism appears to be intact, patella tendon appears to be intact       Radiology: reviewed   Narrative & Impression   TWO-VIEW LEFT KNEE     HISTORY: Fell. Knee pain.     FINDINGS: There is some minimal degenerative change with some minimal  marginal " spurring of the medial compartment. There is no fracture or  joint effusion.     This report was finalized on 1/21/2023 10:16 AM by Dr. Armando Freeman M.D.         Assessment:     ICD-10-CM ICD-9-CM   1. Effusion of left knee  M25.462 719.06   2. Knee instability, left  M25.362 718.86   3. Acute pain of left knee  M25.562 719.46        Procedures  Knee immobilizer and offload with walker /crutch     MDM/Plan:   The diagnosis(es), natural history, pathophysiology and treatment for diagnosis(es) were discussed. Opportunity given and questions answered.  Biomechanics of pertinent body areas discussed.  When appropriate, the use of ambulatory aids discussed.  EXERCISES:  Advice on benefits of, and types of regular/moderate exercise pertaining to orthopedic diagnosis(es).  MEDICATIONS:  The risks, benefits, warnings,side effects and alternatives of medications discussed.  Inflammation/pain control; with cold, heat, elevation and/or liniments discussed as appropriate  HOME EXERCISE/PT program encouraged  MRI.  MEDICAL RECORDS reviewed from other provider(s) for past and current medical history pertinent to this complaint.      2/1/2023    Much of this encounter note is an electronic transcription/translation of spoken language to printed text. The electronic translation of spoken language may permit erroneous, or at times, nonsensical words or phrases to be inadvertently transcribed; Although I have reviewed the note for such errors, some may still exist

## 2023-02-02 ENCOUNTER — PATIENT ROUNDING (BHMG ONLY) (OUTPATIENT)
Dept: ORTHOPEDIC SURGERY | Facility: CLINIC | Age: 63
End: 2023-02-02
Payer: MEDICARE

## 2023-02-02 NOTE — PROGRESS NOTES
A [employer] Message has been sent to the patient for [place] [facility]

## 2023-02-06 ENCOUNTER — TELEPHONE (OUTPATIENT)
Dept: ORTHOPEDIC SURGERY | Facility: CLINIC | Age: 63
End: 2023-02-06
Payer: MEDICARE

## 2023-02-06 ENCOUNTER — TELEPHONE (OUTPATIENT)
Dept: SURGERY | Facility: CLINIC | Age: 63
End: 2023-02-06
Payer: MEDICARE

## 2023-02-06 ENCOUNTER — PRE-ADMISSION TESTING (OUTPATIENT)
Dept: PREADMISSION TESTING | Facility: HOSPITAL | Age: 63
End: 2023-02-06
Payer: MEDICARE

## 2023-02-06 VITALS
WEIGHT: 207.2 LBS | HEIGHT: 63 IN | DIASTOLIC BLOOD PRESSURE: 93 MMHG | OXYGEN SATURATION: 96 % | BODY MASS INDEX: 36.71 KG/M2 | SYSTOLIC BLOOD PRESSURE: 155 MMHG | TEMPERATURE: 98.4 F | HEART RATE: 83 BPM | RESPIRATION RATE: 18 BRPM

## 2023-02-06 DIAGNOSIS — M25.362 KNEE INSTABILITY, LEFT: ICD-10-CM

## 2023-02-06 DIAGNOSIS — K64.4 ANAL SKIN TAG: ICD-10-CM

## 2023-02-06 DIAGNOSIS — M25.462 EFFUSION OF LEFT KNEE: ICD-10-CM

## 2023-02-06 DIAGNOSIS — K60.2 ANAL FISSURE: ICD-10-CM

## 2023-02-06 LAB
ANION GAP SERPL CALCULATED.3IONS-SCNC: 8 MMOL/L (ref 5–15)
BUN SERPL-MCNC: 15 MG/DL (ref 8–23)
BUN/CREAT SERPL: 16 (ref 7–25)
CALCIUM SPEC-SCNC: 9.3 MG/DL (ref 8.6–10.5)
CHLORIDE SERPL-SCNC: 104 MMOL/L (ref 98–107)
CO2 SERPL-SCNC: 27 MMOL/L (ref 22–29)
CREAT SERPL-MCNC: 0.94 MG/DL (ref 0.57–1)
DEPRECATED RDW RBC AUTO: 45.1 FL (ref 37–54)
EGFRCR SERPLBLD CKD-EPI 2021: 67.5 ML/MIN/1.73
ERYTHROCYTE [DISTWIDTH] IN BLOOD BY AUTOMATED COUNT: 13.8 % (ref 12.3–15.4)
GLUCOSE SERPL-MCNC: 102 MG/DL (ref 65–99)
HCT VFR BLD AUTO: 40.5 % (ref 34–46.6)
HGB BLD-MCNC: 13 G/DL (ref 12–15.9)
MCH RBC QN AUTO: 28.7 PG (ref 26.6–33)
MCHC RBC AUTO-ENTMCNC: 32.1 G/DL (ref 31.5–35.7)
MCV RBC AUTO: 89.4 FL (ref 79–97)
PLATELET # BLD AUTO: 337 10*3/MM3 (ref 140–450)
PMV BLD AUTO: 10 FL (ref 6–12)
POTASSIUM SERPL-SCNC: 4.8 MMOL/L (ref 3.5–5.2)
QT INTERVAL: 399 MS
RBC # BLD AUTO: 4.53 10*6/MM3 (ref 3.77–5.28)
SODIUM SERPL-SCNC: 139 MMOL/L (ref 136–145)
WBC NRBC COR # BLD: 6.62 10*3/MM3 (ref 3.4–10.8)

## 2023-02-06 PROCEDURE — 36415 COLL VENOUS BLD VENIPUNCTURE: CPT

## 2023-02-06 PROCEDURE — 85027 COMPLETE CBC AUTOMATED: CPT

## 2023-02-06 PROCEDURE — 80048 BASIC METABOLIC PNL TOTAL CA: CPT

## 2023-02-06 PROCEDURE — 93005 ELECTROCARDIOGRAM TRACING: CPT

## 2023-02-06 PROCEDURE — 93010 ELECTROCARDIOGRAM REPORT: CPT | Performed by: STUDENT IN AN ORGANIZED HEALTH CARE EDUCATION/TRAINING PROGRAM

## 2023-02-06 RX ORDER — ACETAMINOPHEN 500 MG
500 TABLET ORAL EVERY 6 HOURS PRN
COMMUNITY

## 2023-02-06 NOTE — TELEPHONE ENCOUNTER
· Discussed options of excision of remaining anal tag, as well as Botox for anal fissure. Discussed risk of temporary fecal incontinence with Botox. Discussed option of nitroglycerin cream with common adverse effect of headaches.   · Schedule exam under anesthesia with possible chemical sphincterotomy with Botox, possible excision of anal tag. Discussed with patient that we may need to have a more thorough exam before we are able to determine the best course of action. Discussed with her that the tag may be too close to the fissure to be able to excise at time of surgery. Discussed postoperative pain expected with this procedure. Patient verbalizes understanding and wishes to proceed.    From Rosalind's note

## 2023-02-06 NOTE — DISCHARGE INSTRUCTIONS
Take the following medications the morning of surgery: ALBUTEROL INHALER    ARRIVAL TIME IS 0830 ON 02/09/2023      If you are on prescription narcotic pain medication to control your pain you may also take that medication the morning of surgery.    General Instructions:    Follow your surgeons instructions regarding when to stop solid foods and when to stop liquids.   Verify with your surgeon if you are to complete a bowel prep and when to do so.  Patients who avoid smoking, chewing tobacco and alcohol for 4 weeks prior to surgery have a reduced risk of post-operative complications.  Quit smoking as many days before surgery as you can.  Do not smoke, use chewing tobacco or drink alcohol the day of surgery.   If applicable bring your C-PAP/ BI-PAP machine.  Bring any papers given to you in the doctor’s office.  Wear clean comfortable clothes.  Do not wear contact lenses, false eyelashes or make-up.  Bring a case for your glasses.   Bring crutches or walker if applicable.  Remove all piercings.  Leave jewelry and any other valuables at home.  Hair extensions with metal clips must be removed prior to surgery.  The Pre-Admission Testing nurse will instruct you to bring medications if unable to obtain an accurate list in Pre-Admission Testing.            Preventing a Surgical Site Infection:  For 2 to 3 days before surgery, avoid shaving with a razor because the razor can irritate skin and make it easier to develop an infection.    Any areas of open skin can increase the risk of a post-operative wound infection by allowing bacteria to enter and travel throughout the body.  Notify your surgeon if you have any skin wounds / rashes even if it is not near the expected surgical site.  The area will need assessed to determine if surgery should be delayed until it is healed.  The night prior to surgery shower using a fresh bar of anti-bacterial soap (such as Dial) and clean washcloth.  Sleep in a clean bed with clean clothing.   Do not allow pets to sleep with you.  Shower on the morning of surgery using a fresh bar of anti-bacterial soap (such as Dial) and clean washcloth.  Dry with a clean towel and dress in clean clothing.  Ask your surgeon if you will be receiving antibiotics prior to surgery.  Make sure you, your family, and all healthcare providers clean their hands with soap and water or an alcohol based hand  before caring for you or your wound.    Day of surgery: ARRIVAL TIME IS 0830 ON 02/09/2023  Your arrival time is approximately two hours before your scheduled surgery time.  Upon arrival, a Pre-op nurse and Anesthesiologist will review your health history, obtain vital signs, and answer questions you may have.  The only belongings needed at this time will be a list of your home medications and if applicable your C-PAP/BI-PAP machine.  A Pre-op nurse will start an IV and you may receive medication in preparation for surgery, including something to help you relax.     Please be aware that surgery does come with discomfort.  We want to make every effort to control your discomfort so please discuss any uncontrolled symptoms with your nurse.   Your doctor will most likely have prescribed pain medications.      If you are going home after surgery you will receive individualized written care instructions before being discharged.  A responsible adult must drive you to and from the hospital on the day of your surgery and stay with you for 24 hours.  Discharge prescriptions can be filled by the hospital pharmacy during regular pharmacy hours.  If you are having surgery late in the day/evening your prescription may be e-prescribed to your pharmacy.  Please verify your pharmacy hours or chose a 24 hour pharmacy to avoid not having access to your prescription because your pharmacy has closed for the day.    If you are staying overnight following surgery, you will be transported to your hospital room following the recovery period.   [facility] has all private rooms.    If you have any questions please call Pre-Admission Testing at [phone].  Deductibles and co-payments are collected on the day of service. Please be prepared to pay the required co-pay, deductible or deposit on the day of service as defined by your plan.    Call your surgeon immediately if you experience any of the following symptoms:  Sore Throat  Shortness of Breath or difficulty breathing  Cough  Chills  Body soreness or muscle pain  Headache  Fever  New loss of taste or smell  Do not arrive for your surgery ill.  Your procedure will need to be rescheduled to another time.  You will need to call your physician before the day of surgery to avoid any unnecessary exposure to hospital staff as well as other patients.

## 2023-02-06 NOTE — TELEPHONE ENCOUNTER
Spoke with pt and she was informed of her results; appt was scheduled for her to see SPM on 2/20/23 to discuss options.

## 2023-02-06 NOTE — TELEPHONE ENCOUNTER
PT called, said she had PAT done today for surgery on 2-9-23. She said that she is supposed to have a hemorrhoid removed with surgery but PAT told her that it was listed on surgery. Pt wanted to make sure that this will also be taken care of.

## 2023-02-06 NOTE — TELEPHONE ENCOUNTER
----- Message from KANWAL Alcala sent at 2/6/2023  3:55 PM EST -----  Rknee +posterior meniscus tear on MRI would have follow up with knee surgeon. BRIGHT, KENIA,[facility]   ----- Message -----  From: Interface, Scans Incoming  Sent: 2/6/2023   3:34 PM EST  To: KANWAL Alcala

## 2023-02-08 ENCOUNTER — TELEPHONE (OUTPATIENT)
Dept: SURGERY | Facility: CLINIC | Age: 63
End: 2023-02-08
Payer: MEDICARE

## 2023-02-08 NOTE — TELEPHONE ENCOUNTER
Spoke with patient and she was given response to her previous message.  She said she is having a lot of pain and bleeding from a hemorrhoid and said she was told when she was seen in the office that the hemorrhoid would be taken care of at the same time.  ROLY had told her to call our office to have that added on to the surgical consent form and  to allow additional time for the procedure.

## 2023-02-09 ENCOUNTER — ANESTHESIA EVENT (OUTPATIENT)
Dept: PERIOP | Facility: HOSPITAL | Age: 63
End: 2023-02-09
Payer: MEDICARE

## 2023-02-09 ENCOUNTER — ANESTHESIA (OUTPATIENT)
Dept: PERIOP | Facility: HOSPITAL | Age: 63
End: 2023-02-09
Payer: MEDICARE

## 2023-02-09 ENCOUNTER — HOSPITAL ENCOUNTER (OUTPATIENT)
Facility: HOSPITAL | Age: 63
Setting detail: HOSPITAL OUTPATIENT SURGERY
Discharge: HOME | End: 2023-02-09
Attending: COLON & RECTAL SURGERY | Admitting: COLON & RECTAL SURGERY
Payer: MEDICARE

## 2023-02-09 VITALS
TEMPERATURE: 97.7 F | OXYGEN SATURATION: 97 % | DIASTOLIC BLOOD PRESSURE: 59 MMHG | RESPIRATION RATE: 16 BRPM | HEART RATE: 80 BPM | SYSTOLIC BLOOD PRESSURE: 115 MMHG

## 2023-02-09 DIAGNOSIS — K60.2 ANAL FISSURE: ICD-10-CM

## 2023-02-09 DIAGNOSIS — K64.4 ANAL SKIN TAG: ICD-10-CM

## 2023-02-09 LAB
GLUCOSE BLDC GLUCOMTR-MCNC: 115 MG/DL (ref 70–130)
GLUCOSE BLDC GLUCOMTR-MCNC: 131 MG/DL (ref 70–130)

## 2023-02-09 PROCEDURE — 25010000002 ONDANSETRON PER 1 MG: Performed by: NURSE ANESTHETIST, CERTIFIED REGISTERED

## 2023-02-09 PROCEDURE — 46505 CHEMODENERVATION ANAL MUSC: CPT | Performed by: COLON & RECTAL SURGERY

## 2023-02-09 PROCEDURE — 25010000002 MIDAZOLAM PER 1 MG: Performed by: ANESTHESIOLOGY

## 2023-02-09 PROCEDURE — 25010000002 ONABOTULINUMTOXINA 100 UNITS RECONSTITUTED SOLUTION: Performed by: COLON & RECTAL SURGERY

## 2023-02-09 PROCEDURE — 25010000002 KETOROLAC TROMETHAMINE PER 15 MG: Performed by: NURSE ANESTHETIST, CERTIFIED REGISTERED

## 2023-02-09 PROCEDURE — 25010000002 HYDROMORPHONE 1 MG/ML SOLUTION: Performed by: NURSE ANESTHETIST, CERTIFIED REGISTERED

## 2023-02-09 PROCEDURE — 25010000002 PROPOFOL 10 MG/ML EMULSION: Performed by: NURSE ANESTHETIST, CERTIFIED REGISTERED

## 2023-02-09 PROCEDURE — 25010000002 FENTANYL CITRATE (PF) 50 MCG/ML SOLUTION: Performed by: NURSE ANESTHETIST, CERTIFIED REGISTERED

## 2023-02-09 PROCEDURE — 25010000002 DEXAMETHASONE SODIUM PHOSPHATE 20 MG/5ML SOLUTION: Performed by: NURSE ANESTHETIST, CERTIFIED REGISTERED

## 2023-02-09 PROCEDURE — 82962 GLUCOSE BLOOD TEST: CPT

## 2023-02-09 RX ORDER — FENTANYL CITRATE 50 UG/ML
50 INJECTION, SOLUTION INTRAMUSCULAR; INTRAVENOUS
Status: DISCONTINUED | OUTPATIENT
Start: 2023-02-09 | End: 2023-02-09 | Stop reason: HOSPADM

## 2023-02-09 RX ORDER — KETOROLAC TROMETHAMINE 30 MG/ML
INJECTION, SOLUTION INTRAMUSCULAR; INTRAVENOUS AS NEEDED
Status: DISCONTINUED | OUTPATIENT
Start: 2023-02-09 | End: 2023-02-09 | Stop reason: SURG

## 2023-02-09 RX ORDER — SODIUM CHLORIDE 0.9 % (FLUSH) 0.9 %
10 SYRINGE (ML) INJECTION EVERY 12 HOURS SCHEDULED
Status: DISCONTINUED | OUTPATIENT
Start: 2023-02-09 | End: 2023-02-09 | Stop reason: HOSPADM

## 2023-02-09 RX ORDER — FAMOTIDINE 10 MG/ML
20 INJECTION, SOLUTION INTRAVENOUS ONCE
Status: COMPLETED | OUTPATIENT
Start: 2023-02-09 | End: 2023-02-09

## 2023-02-09 RX ORDER — NALOXONE HCL 0.4 MG/ML
0.2 VIAL (ML) INJECTION AS NEEDED
Status: DISCONTINUED | OUTPATIENT
Start: 2023-02-09 | End: 2023-02-09 | Stop reason: HOSPADM

## 2023-02-09 RX ORDER — ONDANSETRON 4 MG/1
4 TABLET, FILM COATED ORAL ONCE AS NEEDED
Status: DISCONTINUED | OUTPATIENT
Start: 2023-02-09 | End: 2023-02-09 | Stop reason: HOSPADM

## 2023-02-09 RX ORDER — MIDAZOLAM HYDROCHLORIDE 1 MG/ML
1 INJECTION INTRAMUSCULAR; INTRAVENOUS
Status: DISCONTINUED | OUTPATIENT
Start: 2023-02-09 | End: 2023-02-09 | Stop reason: HOSPADM

## 2023-02-09 RX ORDER — HYDRALAZINE HYDROCHLORIDE 20 MG/ML
5 INJECTION INTRAMUSCULAR; INTRAVENOUS
Status: DISCONTINUED | OUTPATIENT
Start: 2023-02-09 | End: 2023-02-09 | Stop reason: HOSPADM

## 2023-02-09 RX ORDER — MAGNESIUM HYDROXIDE 1200 MG/15ML
LIQUID ORAL AS NEEDED
Status: DISCONTINUED | OUTPATIENT
Start: 2023-02-09 | End: 2023-02-09 | Stop reason: HOSPADM

## 2023-02-09 RX ORDER — OXYCODONE AND ACETAMINOPHEN 7.5; 325 MG/1; MG/1
1 TABLET ORAL EVERY 4 HOURS PRN
Status: DISCONTINUED | OUTPATIENT
Start: 2023-02-09 | End: 2023-02-09 | Stop reason: HOSPADM

## 2023-02-09 RX ORDER — HYDROMORPHONE HYDROCHLORIDE 1 MG/ML
0.5 INJECTION, SOLUTION INTRAMUSCULAR; INTRAVENOUS; SUBCUTANEOUS
Status: DISCONTINUED | OUTPATIENT
Start: 2023-02-09 | End: 2023-02-09 | Stop reason: HOSPADM

## 2023-02-09 RX ORDER — ONDANSETRON 2 MG/ML
4 INJECTION INTRAMUSCULAR; INTRAVENOUS ONCE AS NEEDED
Status: DISCONTINUED | OUTPATIENT
Start: 2023-02-09 | End: 2023-02-09 | Stop reason: HOSPADM

## 2023-02-09 RX ORDER — FENTANYL CITRATE 50 UG/ML
INJECTION, SOLUTION INTRAMUSCULAR; INTRAVENOUS AS NEEDED
Status: DISCONTINUED | OUTPATIENT
Start: 2023-02-09 | End: 2023-02-09 | Stop reason: SURG

## 2023-02-09 RX ORDER — CLINDAMYCIN PHOSPHATE 900 MG/50ML
900 INJECTION INTRAVENOUS ONCE
Status: COMPLETED | OUTPATIENT
Start: 2023-02-09 | End: 2023-02-09

## 2023-02-09 RX ORDER — LIDOCAINE HYDROCHLORIDE 20 MG/ML
INJECTION, SOLUTION INFILTRATION; PERINEURAL AS NEEDED
Status: DISCONTINUED | OUTPATIENT
Start: 2023-02-09 | End: 2023-02-09

## 2023-02-09 RX ORDER — PROPOFOL 10 MG/ML
VIAL (ML) INTRAVENOUS AS NEEDED
Status: DISCONTINUED | OUTPATIENT
Start: 2023-02-09 | End: 2023-02-09

## 2023-02-09 RX ORDER — LABETALOL HYDROCHLORIDE 5 MG/ML
5 INJECTION, SOLUTION INTRAVENOUS
Status: DISCONTINUED | OUTPATIENT
Start: 2023-02-09 | End: 2023-02-09 | Stop reason: HOSPADM

## 2023-02-09 RX ORDER — SODIUM CHLORIDE, SODIUM LACTATE, POTASSIUM CHLORIDE, CALCIUM CHLORIDE 600; 310; 30; 20 MG/100ML; MG/100ML; MG/100ML; MG/100ML
9 INJECTION, SOLUTION INTRAVENOUS CONTINUOUS
Status: DISCONTINUED | OUTPATIENT
Start: 2023-02-09 | End: 2023-02-09 | Stop reason: HOSPADM

## 2023-02-09 RX ORDER — SODIUM CHLORIDE 0.9 % (FLUSH) 0.9 %
10 SYRINGE (ML) INJECTION AS NEEDED
Status: DISCONTINUED | OUTPATIENT
Start: 2023-02-09 | End: 2023-02-09 | Stop reason: HOSPADM

## 2023-02-09 RX ORDER — PROPOFOL 10 MG/ML
VIAL (ML) INTRAVENOUS AS NEEDED
Status: DISCONTINUED | OUTPATIENT
Start: 2023-02-09 | End: 2023-02-09 | Stop reason: SURG

## 2023-02-09 RX ORDER — DEXAMETHASONE SODIUM PHOSPHATE 4 MG/ML
INJECTION, SOLUTION INTRA-ARTICULAR; INTRALESIONAL; INTRAMUSCULAR; INTRAVENOUS; SOFT TISSUE AS NEEDED
Status: DISCONTINUED | OUTPATIENT
Start: 2023-02-09 | End: 2023-02-09 | Stop reason: SURG

## 2023-02-09 RX ORDER — DIPHENHYDRAMINE HYDROCHLORIDE 50 MG/ML
12.5 INJECTION INTRAMUSCULAR; INTRAVENOUS
Status: DISCONTINUED | OUTPATIENT
Start: 2023-02-09 | End: 2023-02-09 | Stop reason: HOSPADM

## 2023-02-09 RX ORDER — SODIUM CHLORIDE 9 MG/ML
40 INJECTION, SOLUTION INTRAVENOUS AS NEEDED
Status: DISCONTINUED | OUTPATIENT
Start: 2023-02-09 | End: 2023-02-09 | Stop reason: HOSPADM

## 2023-02-09 RX ORDER — PROMETHAZINE HYDROCHLORIDE 25 MG/1
25 SUPPOSITORY RECTAL ONCE AS NEEDED
Status: DISCONTINUED | OUTPATIENT
Start: 2023-02-09 | End: 2023-02-09 | Stop reason: HOSPADM

## 2023-02-09 RX ORDER — PROMETHAZINE HYDROCHLORIDE 25 MG/1
25 TABLET ORAL ONCE AS NEEDED
Status: DISCONTINUED | OUTPATIENT
Start: 2023-02-09 | End: 2023-02-09 | Stop reason: HOSPADM

## 2023-02-09 RX ORDER — LIDOCAINE HYDROCHLORIDE 20 MG/ML
INJECTION, SOLUTION INFILTRATION; PERINEURAL AS NEEDED
Status: DISCONTINUED | OUTPATIENT
Start: 2023-02-09 | End: 2023-02-09 | Stop reason: SURG

## 2023-02-09 RX ORDER — HYDROCODONE BITARTRATE AND ACETAMINOPHEN 7.5; 325 MG/1; MG/1
1 TABLET ORAL ONCE AS NEEDED
Status: DISCONTINUED | OUTPATIENT
Start: 2023-02-09 | End: 2023-02-09 | Stop reason: HOSPADM

## 2023-02-09 RX ORDER — MIDAZOLAM HYDROCHLORIDE 1 MG/ML
0.5 INJECTION INTRAMUSCULAR; INTRAVENOUS
Status: DISCONTINUED | OUTPATIENT
Start: 2023-02-09 | End: 2023-02-09 | Stop reason: HOSPADM

## 2023-02-09 RX ORDER — ONDANSETRON 2 MG/ML
INJECTION INTRAMUSCULAR; INTRAVENOUS AS NEEDED
Status: DISCONTINUED | OUTPATIENT
Start: 2023-02-09 | End: 2023-02-09 | Stop reason: SURG

## 2023-02-09 RX ORDER — EPHEDRINE SULFATE 50 MG/ML
5 INJECTION, SOLUTION INTRAVENOUS ONCE AS NEEDED
Status: DISCONTINUED | OUTPATIENT
Start: 2023-02-09 | End: 2023-02-09 | Stop reason: HOSPADM

## 2023-02-09 RX ORDER — FLUMAZENIL 0.1 MG/ML
0.2 INJECTION INTRAVENOUS AS NEEDED
Status: DISCONTINUED | OUTPATIENT
Start: 2023-02-09 | End: 2023-02-09 | Stop reason: HOSPADM

## 2023-02-09 RX ORDER — DIPHENHYDRAMINE HCL 25 MG
25 CAPSULE ORAL
Status: DISCONTINUED | OUTPATIENT
Start: 2023-02-09 | End: 2023-02-09 | Stop reason: HOSPADM

## 2023-02-09 RX ADMIN — AZTREONAM 2 G: 2 INJECTION, POWDER, LYOPHILIZED, FOR SOLUTION INTRAMUSCULAR; INTRAVENOUS at 08:28

## 2023-02-09 RX ADMIN — DEXAMETHASONE SODIUM PHOSPHATE 6 MG: 4 INJECTION, SOLUTION INTRAMUSCULAR; INTRAVENOUS at 08:42

## 2023-02-09 RX ADMIN — KETOROLAC TROMETHAMINE 30 MG: 30 INJECTION, SOLUTION INTRAMUSCULAR at 08:43

## 2023-02-09 RX ADMIN — FAMOTIDINE 20 MG: 10 INJECTION INTRAVENOUS at 08:25

## 2023-02-09 RX ADMIN — PROPOFOL 200 MG: 10 INJECTION, EMULSION INTRAVENOUS at 08:37

## 2023-02-09 RX ADMIN — LIDOCAINE HYDROCHLORIDE 100 MG: 20 INJECTION, SOLUTION INFILTRATION; PERINEURAL at 08:37

## 2023-02-09 RX ADMIN — CLINDAMYCIN PHOSPHATE 900 MG: 900 INJECTION, SOLUTION INTRAVENOUS at 08:28

## 2023-02-09 RX ADMIN — HYDROMORPHONE HYDROCHLORIDE 0.5 MG: 1 INJECTION, SOLUTION INTRAMUSCULAR; INTRAVENOUS; SUBCUTANEOUS at 08:59

## 2023-02-09 RX ADMIN — ONDANSETRON 4 MG: 2 INJECTION INTRAMUSCULAR; INTRAVENOUS at 08:43

## 2023-02-09 RX ADMIN — MIDAZOLAM 0.5 MG: 1 INJECTION INTRAMUSCULAR; INTRAVENOUS at 08:24

## 2023-02-09 RX ADMIN — FENTANYL CITRATE 100 MCG: 0.05 INJECTION, SOLUTION INTRAMUSCULAR; INTRAVENOUS at 08:37

## 2023-02-09 RX ADMIN — SODIUM CHLORIDE, POTASSIUM CHLORIDE, SODIUM LACTATE AND CALCIUM CHLORIDE 9 ML/HR: 600; 310; 30; 20 INJECTION, SOLUTION INTRAVENOUS at 08:25

## 2023-02-09 NOTE — ADDENDUM NOTE
Addendum  created 02/09/23 1230 by Smooth Carrion MD    Attestation recorded in Intraprocedure, Clinical Note Signed, Intraprocedure Attestations filed

## 2023-02-09 NOTE — H&P
"Alisha Connolly is a 63 y.o. female in for follow up of hemorrhoids. She underwent colonoscopy, hemorrhoid banding x3, anal tag excision on 9/19/22. She was seen postoperatively on 10/3/22 by Britni Spears PA-C, was doing well, and was recommended to follow up as needed.     Today Ms. Connolly reports ongoing severe anal pain as well as bleeding. She believes that one anal tag was excised at the time of her colonoscopy last September, but one tag remains and is causing symptoms. Her symptoms are better now compared to before the anal tag excision in September, but they are severe. She fractured her ankle recently, so that has kept her busy, which is why she has not been back in to our office until now. Symptoms are exacerbated with BMs. Has some discomfort when sitting. Tries to push in but keeps coming out. Has a tag/flap, hard to clean, gets sore. When bleeds, bleeds a lot--2-3 times in the last couple of weeks. Lidocaine and hydrocortisone both sting and irritate the area. Drinks smooth move tea every night; she states this has worked better than fiber, miralax, and stool softeners. Has BM daily. Meeker 5-6. Patient has tried diltiazem cream for fissures in the past without relief.      /70 (BP Location: Left arm, Patient Position: Sitting, Cuff Size: Large Adult)   Pulse 94   Temp 97.3 °F (36.3 °C) (Temporal)   Ht 160 cm (63\")   Wt 93.5 kg (206 lb 1.6 oz)   LMP  (LMP Unknown)   SpO2 98%   Breastfeeding No   BMI 36.51 kg/m²   Body mass index is 36.51 kg/m².  -  Physical Exam  No acute distress  Chaperone present  Perianal exam: anal tag in posterior position just left of midline. In midline posterior position is what looks like a small visible portion of a fissure. JASSI and anoscopy not performed due to pain.      Assessment:   1. Anal skin tag    2. Anal fissure          Plan:  • Discussed options of excision of remaining anal tag, as well as Botox for anal fissure. Discussed risk of temporary fecal " incontinence with Botox. Discussed option of nitroglycerin cream with common adverse effect of headaches.   • Schedule exam under anesthesia with possible chemical sphincterotomy with Botox, possible excision of anal tag. Discussed with patient that we may need to have a more thorough exam before we are able to determine the best course of action. Discussed with her that the tag may be too close to the fissure to be able to excise at time of surgery. Discussed postoperative pain expected with this procedure. Patient verbalizes understanding and wishes to proceed.

## 2023-02-09 NOTE — ANESTHESIA POSTPROCEDURE EVALUATION
Patient: Alisha Connolly    Procedure Summary     Date: 02/09/23 Room / Location: [facility] OR [address] [place] [address] MAIN OR    Anesthesia Start: 0831 Anesthesia Stop: 0912    Procedure: EXAM UNDER ANESTHESIA, CHEMICAL SPHINCTEROTOMY WITH BOTOX (Anus) Diagnosis:       Anal skin tag      Anal fissure      (Anal skin tag [K64.4])      (Anal fissure [K60.2])    Surgeons: Sarai Perez MD Provider: Smooth Carrion MD    Anesthesia Type: general ASA Status: 3          Anesthesia Type: general    Vitals  Vitals Value Taken Time   /64 02/09/23 1015   Temp 36.5 °C (97.7 °F) 02/09/23 1015   Pulse 77 02/09/23 1015   Resp 14 02/09/23 1015   SpO2 95 % 02/09/23 1015           Post Anesthesia Care and Evaluation    Patient location during evaluation: bedside  Patient participation: complete - patient participated  Level of consciousness: awake and alert  Pain management: adequate    Airway patency: patent  Anesthetic complications: No anesthetic complications    Cardiovascular status: acceptable  Respiratory status: acceptable  Hydration status: acceptable    Comments: LMP  (LMP Unknown)

## 2023-02-09 NOTE — ANESTHESIA POSTPROCEDURE EVALUATION
Patient: Alisha Connolly    Procedure Summary     Date: 02/09/23 Room / Location: [facility] OR  / [facility] MAIN OR    Anesthesia Start: 0831 Anesthesia Stop: 0912    Procedure: EXAM UNDER ANESTHESIA, CHEMICAL SPHINCTEROTOMY WITH BOTOX (Anus) Diagnosis:       Anal skin tag      Anal fissure      (Anal skin tag [K64.4])      (Anal fissure [K60.2])    Surgeons: Sarai Perez MD Provider: Smooth Carrion MD    Anesthesia Type: general ASA Status: 3          Anesthesia Type: general    Vitals  Vitals Value Taken Time   /64 02/09/23 1016   Temp 36.5 °C (97.7 °F) 02/09/23 1015   Pulse 76 02/09/23 1022   Resp 14 02/09/23 1015   SpO2 95 % 02/09/23 1022   Vitals shown include unvalidated device data.        Post Anesthesia Care and Evaluation    Patient location during evaluation: PACU  Patient participation: complete - patient participated  Level of consciousness: awake and alert  Pain management: adequate    Airway patency: patent  Anesthetic complications: No anesthetic complications    Cardiovascular status: acceptable  Respiratory status: acceptable  Hydration status: acceptable    Comments: --------------------            02/09/23               1030     --------------------   BP:       115/81     Pulse:      84       Resp:       16       Temp:                SpO2:      96%      --------------------

## 2023-02-09 NOTE — ANESTHESIA PROCEDURE NOTES
Airway  Urgency: elective    Date/Time: 2/9/2023 8:38 AM    General Information and Staff    Patient location during procedure: OR  Anesthesiologist: Smooth Carrion MD  CRNA/CAA: Jazzy Pierson CRNA    Indications and Patient Condition    Preoxygenated: yes  Mask difficulty assessment: 0 - not attempted    Final Airway Details  Final airway type: supraglottic airway      Successful airway: unique  Size 4     Number of attempts at approach: 1  Assessment: lips, teeth, and gum same as pre-op and atraumatic intubation    Additional Comments  Atraumatic, adeq seal, secured, MV/AV until SV

## 2023-02-09 NOTE — ANESTHESIA PREPROCEDURE EVALUATION
Anesthesia Evaluation     Patient summary reviewed and Nursing notes reviewed   history of anesthetic complications: PONV  NPO Solid Status: > 6 hours  NPO Liquid Status: > 6 hours           Airway   Mallampati: II  TM distance: >3 FB  Neck ROM: full  no difficulty expected and No difficulty expected  Dental - normal exam     Pulmonary - normal exam    breath sounds clear to auscultation  (+) asthma,sleep apnea on CPAP,   (-) rhonchi, decreased breath sounds, wheezes, rales, stridor  Cardiovascular - normal exam    NYHA Classification: I  Rhythm: regular  Rate: normal    (+) hypertension, hyperlipidemia,   (-) murmur, weak pulses, friction rub, systolic click, carotid bruits, JVD, peripheral edema      Neuro/Psych- negative ROS  GI/Hepatic/Renal/Endo    (+)   diabetes mellitus type 2 well controlled,     Musculoskeletal     Abdominal  - normal exam    Abdomen: soft.   Substance History - negative use     OB/GYN negative ob/gyn ROS         Other   arthritis,                      Anesthesia Plan    ASA 3     general     intravenous induction     Anesthetic plan, risks, benefits, and alternatives have been provided, discussed and informed consent has been obtained with: patient.        CODE STATUS:

## 2023-02-09 NOTE — OP NOTE
Surgeon: Sarai Perez MD    Surgical  Assistant: Rosalind Richard PA-C     Preoperative diagnosis: Anal skin tag [K64.4]  Anal fissure [K60.2]    Post-Op Diagnosis Codes:     * Anal skin tag [K64.4]     * Anal fissure [K60.2]    Procedure: EXAM UNDER ANESTHESIA, CHEMICAL SPHINCTEROTOMY WITH BOTOX, * Panel 2 does not exist *    Estimated Blood Loss: minimal    Specimens: None   * No orders in the log *    Indication:  Alisha Connolly is a 63 y.o. female who comes in with Anal skin tag    Anal fissure  Patient understands risks, benefits,and alternatives wishes to proceed.      Procedure Details:  The patient was brought into the operating room.  SCDs in place.  Antibiotics were infused.  After general anesthesia was achieved the patient was placed in candy cane lithotomy.   She was prepped and draped in sterile fashion.  Lidocaine 1% with epinephrine and Marcaine 0.25% without was used as a local infiltration in a perineum block.  On exam of the anal canal the patient has some mildly enlarged external hemorrhoids in the posterior position.  On exam of the internal anal canal, using the Bowen bivalve the patient has grade 2 internal hemorrhoids in the left lateral, right posterior and right anterior positions.  I used the Bowen bivalve to put the anus on a little bit of stretch and she does have a tight sphincter.  Posteriorly I placed 20 units of Botox. Anteriorly I placed 20 and right laterally  I placed 25.  The patient tolerated it well.  I did not do any excision of tag or hemorrhoidectomy. Because of the fissure she will have difficulty healing. The patient was stable throughout the entire case.  All instrument, lap counts and needle counts were correct.  The patient was taken to recovery.

## 2023-02-17 ENCOUNTER — OFFICE VISIT (OUTPATIENT)
Dept: ORTHOPEDIC SURGERY | Facility: CLINIC | Age: 63
End: 2023-02-17
Payer: MEDICARE

## 2023-02-17 VITALS — BODY MASS INDEX: 36.68 KG/M2 | WEIGHT: 207 LBS | HEIGHT: 63 IN | TEMPERATURE: 97.8 F

## 2023-02-17 DIAGNOSIS — M17.12 ARTHRITIS OF LEFT KNEE: Primary | ICD-10-CM

## 2023-02-17 DIAGNOSIS — S86.912A KNEE STRAIN, LEFT, INITIAL ENCOUNTER: ICD-10-CM

## 2023-02-17 PROCEDURE — 20610 DRAIN/INJ JOINT/BURSA W/O US: CPT | Performed by: ORTHOPAEDIC SURGERY

## 2023-02-17 PROCEDURE — 99214 OFFICE O/P EST MOD 30 MIN: CPT | Performed by: ORTHOPAEDIC SURGERY

## 2023-02-17 RX ORDER — LIDOCAINE HYDROCHLORIDE 10 MG/ML
1 INJECTION, SOLUTION EPIDURAL; INFILTRATION; INTRACAUDAL; PERINEURAL
Status: COMPLETED | OUTPATIENT
Start: 2023-02-17 | End: 2023-02-17

## 2023-02-17 RX ORDER — METHYLPREDNISOLONE ACETATE 80 MG/ML
80 INJECTION, SUSPENSION INTRA-ARTICULAR; INTRALESIONAL; INTRAMUSCULAR; SOFT TISSUE
Status: COMPLETED | OUTPATIENT
Start: 2023-02-17 | End: 2023-02-17

## 2023-02-17 RX ADMIN — METHYLPREDNISOLONE ACETATE 80 MG: 80 INJECTION, SUSPENSION INTRA-ARTICULAR; INTRALESIONAL; INTRAMUSCULAR; SOFT TISSUE at 09:19

## 2023-02-17 RX ADMIN — LIDOCAINE HYDROCHLORIDE 1 ML: 10 INJECTION, SOLUTION EPIDURAL; INFILTRATION; INTRACAUDAL; PERINEURAL at 09:19

## 2023-02-17 NOTE — PROGRESS NOTES
See other note        Large Joint Arthrocentesis: L knee  Date/Time: 2/17/2023 9:19 AM  Consent given by: patient  Site marked: site marked  Timeout: Immediately prior to procedure a time out was called to verify the correct patient, procedure, equipment, support staff and site/side marked as required   Supporting Documentation  Indications: pain   Procedure Details  Location: knee - L knee  Preparation: Patient was prepped and draped in the usual sterile fashion  Needle gauge: 21G.  Medications administered: 80 mg methylPREDNISolone acetate 80 MG/ML; 1 mL lidocaine PF 1% 1 %  Patient tolerance: patient tolerated the procedure well with no immediate complications

## 2023-02-27 ENCOUNTER — OFFICE VISIT (OUTPATIENT)
Dept: SURGERY | Facility: CLINIC | Age: 63
End: 2023-02-27
Payer: MEDICARE

## 2023-02-27 VITALS
SYSTOLIC BLOOD PRESSURE: 118 MMHG | OXYGEN SATURATION: 95 % | DIASTOLIC BLOOD PRESSURE: 66 MMHG | HEIGHT: 63 IN | HEART RATE: 103 BPM | BODY MASS INDEX: 36.09 KG/M2 | WEIGHT: 203.7 LBS

## 2023-02-27 DIAGNOSIS — K62.5 RECTAL BLEEDING: ICD-10-CM

## 2023-02-27 DIAGNOSIS — K62.89 RECTAL PAIN: ICD-10-CM

## 2023-02-27 DIAGNOSIS — K60.2 ANAL FISSURE: Primary | ICD-10-CM

## 2023-02-27 PROCEDURE — 99214 OFFICE O/P EST MOD 30 MIN: CPT | Performed by: PHYSICIAN ASSISTANT

## 2023-02-27 NOTE — PROGRESS NOTES
"Alisha Connolly is a 63 y.o. female in for follow up of anal fissure status post exam under anesthesia, chemical sphincterotomy with Botox on 2/9/2023.    Was doing well until 1 day last week, when she started to have pain and bleeding per rectum x2-3 days. Bled a lot to the point that stool was red. It stopped after 2-3 days, and she has had no problems  since. She is doing sitz baths and taking ibuprofen when having any discomfort.    /66 (BP Location: Left arm, Patient Position: Sitting, Cuff Size: Large Adult)   Pulse 103   Ht 160 cm (63\")   Wt 92.4 kg (203 lb 11.2 oz)   LMP  (LMP Unknown)   SpO2 95%   Breastfeeding No   BMI 36.08 kg/m²   Body mass index is 36.08 kg/m².  -  Physical Exam  No acute distress  Chaperone present  Perianal exam: no erythema, induration, or sign of infection. Small anal skin tags present.     Assessment:   1. Anal fissure    2. Rectal pain    3. Rectal bleeding    - status post exam under anesthesia, chemical sphincterotomy with Botox on 2/9/2023    Plan:  • Continue current regimen. Can experiment with different doses of fiber and smooth move tea to see what works best for stool consistency, symptom control, and BM regularity.  • Follow up in 4 weeks for reevaluation  • Avoid steroid cream. Compound cream causes irritation for this pt.        Rosalind Richard PA-C  Physician Assistant  Colorectal Surgery      "

## 2023-03-06 ENCOUNTER — TELEPHONE (OUTPATIENT)
Dept: ORTHOPEDIC SURGERY | Facility: CLINIC | Age: 63
End: 2023-03-06

## 2023-03-06 NOTE — TELEPHONE ENCOUNTER
The steroid injection can take up to 3-4 weeks to fully kick in. In the meantime she can continue with topical cream and icing and elevations. She may also want to take some OTC Advil or Aleve and OTC Tylenol to help with swelling and pain. Thank you!

## 2023-03-06 NOTE — TELEPHONE ENCOUNTER
Spoke with patient and she states knee is swelling with a very hard time with walking. Patient states she has been favoring the left knee a lot. Patient states is radiation is down to the ankle. Patient states she is very unmobile and has to continuously takes breaking throughout the day to help knee pain. Patient a lot states she has been using emu cream to help the knee along with icing and elevation of the knee. Please review and advise. Patient is scheduled with you on Wednesday at 10:50 am. [phone].

## 2023-03-06 NOTE — TELEPHONE ENCOUNTER
Provider: DR. SMALLS    Caller: SELF    Relationship to Patient: SELF      Phone Number: [phone]    Reason for Call: PT. WAS SEEN ON 02/20/23 AND GOT CORTISONE INJECTION.   SHE IS STILL HAVING HORRIBLE PAIN AND TROUBLE WALKING.   SHE HAS APPT. ON Friday, DOES DR. SMALLS HAVE ANY SUGGESTIONS AS TO WHAT SHE SHOULD DO UNTIL THEN?   OR, SHOULD SHE COME IN SOONER?  PLEASE ADVISE.       When was the patient last seen:02/20/23

## 2023-03-08 ENCOUNTER — OFFICE VISIT (OUTPATIENT)
Dept: ORTHOPEDIC SURGERY | Facility: CLINIC | Age: 63
End: 2023-03-08
Payer: MEDICARE

## 2023-03-08 ENCOUNTER — TELEPHONE (OUTPATIENT)
Dept: ORTHOPEDIC SURGERY | Facility: CLINIC | Age: 63
End: 2023-03-08

## 2023-03-08 VITALS — HEIGHT: 63 IN | TEMPERATURE: 96.5 F | WEIGHT: 207 LBS | BODY MASS INDEX: 36.68 KG/M2

## 2023-03-08 DIAGNOSIS — M17.12 ARTHRITIS OF LEFT KNEE: ICD-10-CM

## 2023-03-08 DIAGNOSIS — R52 PAIN: Primary | ICD-10-CM

## 2023-03-08 PROCEDURE — 99214 OFFICE O/P EST MOD 30 MIN: CPT | Performed by: ORTHOPAEDIC SURGERY

## 2023-03-08 PROCEDURE — 73562 X-RAY EXAM OF KNEE 3: CPT | Performed by: ORTHOPAEDIC SURGERY

## 2023-03-08 RX ORDER — METHYLPREDNISOLONE 4 MG/1
TABLET ORAL
Qty: 21 TABLET | Refills: 0 | Status: SHIPPED | OUTPATIENT
Start: 2023-03-08

## 2023-03-08 RX ORDER — MELOXICAM 15 MG/1
15 TABLET ORAL DAILY
Qty: 30 TABLET | Refills: 3 | Status: SHIPPED | OUTPATIENT
Start: 2023-03-08

## 2023-03-08 NOTE — PROGRESS NOTES
Patient Name: Alisha Connolly   YOB: 1960  Referring Primary Care Physician: Aarti Ogden APRN  BMI: Body mass index is 36.67 kg/m².    Chief Complaint:    Chief Complaint   Patient presents with   • Left Knee - Follow-up        HPI:     Alisha Connolly is a 63 y.o. female who presents today for evaluation of   Chief Complaint   Patient presents with   • Left Knee - Follow-up   .  Alisha follows up today on her left knee with her .  She not doing well with it.  Is been bothering her now for few months.  We did a cortisone shot about 3 weeks ago and initially did very well but she is not lasting and she continues to have some swelling.  She also has pain.  She has a very busy spring schedule as well.  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   • albuterol sulfate  (90 Base) MCG/ACT inhaler Inhale 2 puffs Every 4 (Four) Hours As Needed.   • aspirin 81 MG EC tablet Take 1 tablet by mouth Daily. HOLDING FOR SURGERY   • cholecalciferol (VITAMIN D3) 25 MCG (1000 UT) tablet Take 1 tablet by mouth Daily. HOLDING FOR SURGERY   • DHA-EPA-Vitamin E 192-251-11 MG-MG-UNIT capsule Take 1 tablet by mouth. HOLDING FOR SURGERY   • Estradiol Acetate (Femring) 0.05 MG/24HR ring Insert 1 each into the vagina. CHANGES EVERY 3 MONTHS   • Lancets (OneTouch Delica Plus [id number]) misc 1 each by Other route Daily.   • lisinopril (PRINIVIL,ZESTRIL) 40 MG tablet Take 1 tablet by mouth Daily.   • metFORMIN (GLUCOPHAGE) 500 MG tablet Take 1 tablet by mouth 2 (Two) Times a Day With Meals.   • OneTouch Ultra test strip 1 each by Other route Daily.     No current facility-administered medications on file prior to visit.     Current Medications:  Scheduled Meds:  Continuous Infusions:No current  facility-administered medications for this visit.    PRN Meds:.    I have reviewed the patient's medical history in detail and updated the computerized patient record.  Review and summarization of old records includes:    Past Medical History:   Diagnosis Date   • Abnormal Pap smear of cervix    • Age-related nuclear cataract of both eyes 02/03/2020   • Anal fissure 10/2021   • Anal skin tag 08/2022    S/P EXCISION   • Anal skin tag    • Ankle fracture 12/2022    RIGHT   • Arthrodesis status 03/08/2014   • Asthma    • Benign essential hypertension 06/05/2015    Continue lisinopril   • Carpal tunnel syndrome    • Cervical spondylolysis    • Chronic neck pain 07/19/2016   • Closed nondisplaced fracture of lateral malleolus of right fibula 12/10/2022    NO SURGERY, SEEN AT [facility]   • Cystitis 11/2017   • Dry eye syndrome 02/03/2020   • Environmental allergies    • Exogenous hypertriglyceridemia 06/05/2017   • Ganglion cyst of finger 01/2020   • Hemorrhoid    • Hemorrhoids    • History of COVID-19 05/2022   • Hormone replacement therapy (HRT)    • Hyperchylomicronemia    • Hyperlipidemia 06/05/2015    ASCVD 10-year risk ~10% Plan: · Start statin. Unclear whether arm numbness really 2/2 statin in the past.   • Hyperopia of both eyes 02/03/2020   • Keratoconjunctivitis sicca (HCC) 02/03/2020   • Knee pain, left    • Lichen sclerosus    • Lung nodule 06/01/2006   • Obstructive sleep apnea syndrome 11/01/2004    Not using CPAP due to eye issues   • Palpitations    • Pertussis 02/08/2016    [facility]   • PNA (pneumonia)    • PONV (postoperative nausea and vomiting)    • Presbyopia 02/03/2020   • Rotator cuff injury 12/04/2011    SEEN AT [facility]   • Seasonal allergies    • Slow to wake up after anesthesia    • Snoring    • Type 2 diabetes mellitus without complication ([facility]) 12/21/2015    Under good control Plan: · Reassured about use of metformin   • Urinary urgency 06/2021        Past Surgical History:   Procedure  Laterality Date   • ABDOMINAL HYSTERECTOMY     • ANTERIOR CERVICAL DISCECTOMY W/ FUSION N/A 2014    C6-7, DR. ALEXEY IZAGUIRRE AT [facility]. TITANIUM PLATE   • ANUS SURGERY N/A 2022    Procedure: ANAL TAG EXCISION;  Surgeon: Bradley Santiago MD;  Location:  [facility];  Service: General;  Laterality: N/A;  pre-anal tag  post-same   • CERVICAL SPINE SURGERY      TITANIUM PLATE   •  SECTION     • COLONOSCOPY N/A    • COLONOSCOPY N/A 2022    EXTERNAL AND INTERNAL HEMORRHOIDS, GRADE 3-BANDED, RESCOPE IN 10 YRS, DR. BRADLEY SANTIAGO AT [facility]   • HEMORRHOIDECTOMY N/A 2022    Procedure: HEMORRHOID BANDING x3;  Surgeon: Bradley Santiago MD;  Location:  [facility];  Service: General;  Laterality: N/A;  pre-hemorrhoids  post-same   • HYSTERECTOMY N/A     WITH BSO, FOR HEAVY BLEEDING   • INCISIONAL BREAST BIOPSY Right     BENIGN   • LAPAROSCOPIC LYSIS OF ADHESIONS N/A    • OOPHORECTOMY     • OOPHORECTOMY     • SPHINCTEROTOMY N/A 2023    Procedure: EXAM UNDER ANESTHESIA, CHEMICAL SPHINCTEROTOMY WITH BOTOX;  Surgeon: Bradley Santiago MD;  [facility] MAIN OR;  Service: General;  Laterality: N/A;        Social History     Occupational History   • Not on file   Tobacco Use   • Smoking status: Former     Packs/day: 0.25     Years: 4.50     Pack years: 1.13     Types: Cigarettes     Quit date: 3/7/2014     Years since quittin.0     Passive exposure: Past   • Smokeless tobacco: Never   Vaping Use   • Vaping Use: Never used   Substance and Sexual Activity   • Alcohol use: Yes     Comment: occ   • Drug use: Never   • Sexual activity: Yes     Partners: Male     Birth control/protection: Post-menopausal, Hysterectomy      Social History     Social History Narrative   • Not on file        Family History   Problem Relation Age of Onset   • Arthritis Mother    • Heart disease Mother    • Arrhythmia Mother    • Hyperlipidemia Father    • Diabetes Father    • Heart disease Father    •  "Coronary artery disease Father    • Diabetes Sister    • Diabetes Paternal Grandmother    • Cancer Cousin    • Breast cancer Cousin    • Cancer Maternal Great-Grandmother    • Ovarian cancer Maternal Great-Grandmother    • Malig Hyperthermia Neg Hx        ROS: 14 point review of systems was performed and all other systems were reviewed and are negative except for documented findings in HPI and today's encounter.     Allergies:   Allergies   Allergen Reactions   • Penicillins Hives   • Atorvastatin Paresthesia     Arm numbness           • Rosuvastatin Paresthesia     Arm numbness     • Latex Rash   • Tetanus Toxoid, Adsorbed Rash     Constitutional:  Denies fever, shaking or chills   Eyes:  Denies change in visual acuity   HENT:  Denies nasal congestion or sore throat   Respiratory:  Denies cough or shortness of breath   Cardiovascular:  Denies chest pain or severe LE edema   GI:  Denies abdominal pain, nausea, vomiting, bloody stools or diarrhea   Musculoskeletal:  Numbness, tingling, pain, or loss of motor function only as noted above in history of present illness.  : Denies painful urination or hematuria  Integument:  Denies rash, lesion or ulceration   Neurologic:  Denies headache or focal weakness  Endocrine:  Denies lymphadenopathy  Psych:  Denies confusion or change in mental status   Hem:  Denies active bleeding    OBJECTIVE:  Physical Exam: 63 y.o. female  Wt Readings from Last 3 Encounters:   03/08/23 93.9 kg (207 lb)   02/27/23 92.4 kg (203 lb 11.2 oz)   02/17/23 93.9 kg (207 lb)     Ht Readings from Last 1 Encounters:   03/08/23 160 cm (63\")     Body mass index is 36.67 kg/m².  Vitals:    03/08/23 1100   Temp: 96.5 °F (35.8 °C)     Vital signs reviewed.     General Appearance:    Alert, cooperative, in no acute distress                  Eyes: conjunctiva clear  ENT: external ears and nose atraumatic  CV: no peripheral edema  Resp: normal respiratory effort  Skin: no rashes or wounds; normal " turgor  Psych: mood and affect appropriate  Lymph: no nodes appreciated  Neuro: gross sensation intact  Vascular:  Palpable peripheral pulse in noted extremity  Musculoskeletal Extremities: Exam today shows swelling in the knee with some synovitis she has medial joint line tenderness and varus some pseudolaxity Baker's cyst her calf is soft knees knee slightly warm to touch.  And has some stiffness through range of motion she walks with a limp.    Radiology:   Her previous x-ray were AP and lateral supine x-rays in the hospital repeated AP lateral 40 degree PA and sunrise and weightbearing views today in the office shows fairly advanced varus osteoarthritis but with evidence of tricompartmental changes with osteophytes best seen on the lateral view.  She had an MRI of her knee as well which shows a discoid lateral meniscus.  Also says she has subtle changes consistent with this small tear posterior root medial meniscus but nothing large or displaced or extruded.        Assessment:     ICD-10-CM ICD-9-CM   1. Pain  R52 780.96   2. Arthritis of left knee  M17.12 716.96        MDM/Plan:   The diagnosis(es), natural history, pathophysiology and treatment for diagnosis(es) were discussed. Opportunity given and questions answered.  Biomechanics of pertinent body areas discussed.  When appropriate, the use of ambulatory aids discussed.    Biomechanics of pertinent body areas discussed.  When appropriate, the use of ambulatory aids discussed.  BMI:  The concept of BMI body mass index and its importance and implications discussed.    MEDICATIONS:  The risks, benefits, warnings,side effects and alternatives of medications discussed.  Inflammation/pain control; with cold, heat, elevation and/or liniments discussed as appropriate  PT referral.  MEDICAL RECORDS reviewed from other provider(s) for past and current medical history pertinent to this complaint.  Want to try to get her inflammation under control recommended she try  some physical therapy and Center for that we will try on a Medrol Dosepak followed by a new anti-inflammatory.  She was taking naproxen but gave her headaches so she stopped we will try her on some Mobic with precautions have her watch her blood pressure at I eat with food etc.  We will see if this brings along in 6 to 8 weeks we can probably repeat her injection as well as a fields arthritis that hopefully we get under control I do not think arthroscopic surgery would help with the MRI she has not explained this to her told her possibility of knee replacement should she fail to get better.    3/8/2023    Dictated utilizing Dragon dictation

## 2023-03-08 NOTE — TELEPHONE ENCOUNTER
Pt was prescribed MEDROL today..             She wants to know can she start today. She knows she cant get all doses.  Or should she start she  Tomorrow?  Her number is [phone]

## 2023-03-09 ENCOUNTER — TELEPHONE (OUTPATIENT)
Dept: ORTHOPEDIC SURGERY | Facility: CLINIC | Age: 63
End: 2023-03-09
Payer: MEDICARE

## 2023-03-09 DIAGNOSIS — Z87.81 H/O FRACTURE OF ANKLE: Primary | ICD-10-CM

## 2023-03-09 NOTE — TELEPHONE ENCOUNTER
She will need an order for her ankle from whoever she saw for the ankle. Dr. Rasmussen has not treated her for the ankle. He did put in for therapy for her left knee. Thank you!

## 2023-03-09 NOTE — TELEPHONE ENCOUNTER
Moh[facility] called and asked if Dr. Rasmussen can order PT for her Right ankle. She was treated for it in [place] in Dec and January

## 2023-03-15 ENCOUNTER — TREATMENT (OUTPATIENT)
Dept: PHYSICAL THERAPY | Facility: CLINIC | Age: 63
End: 2023-03-15
Payer: MEDICARE

## 2023-03-15 DIAGNOSIS — Z87.81 HISTORY OF FRACTURE OF RIGHT ANKLE: ICD-10-CM

## 2023-03-15 DIAGNOSIS — M17.12 ARTHRITIS OF LEFT KNEE: Primary | ICD-10-CM

## 2023-03-15 PROCEDURE — 97530 THERAPEUTIC ACTIVITIES: CPT | Performed by: PHYSICAL THERAPIST

## 2023-03-15 PROCEDURE — 97110 THERAPEUTIC EXERCISES: CPT | Performed by: PHYSICAL THERAPIST

## 2023-03-15 PROCEDURE — 97162 PT EVAL MOD COMPLEX 30 MIN: CPT | Performed by: PHYSICAL THERAPIST

## 2023-03-15 NOTE — PROGRESS NOTES
"    Physical Therapy Initial Evaluation and Plan of Care    Patient Name: Alisha Connolly          :  1960  Referring Physician: Tomás Rasmussen MD  Diagnosis: Arthritis of left knee [M17.12]  ; (R) Ankle Fx  Date of Evaluation: 3/15/2023  ______________________________________________________________________    Subjective Evaluation    History of Present Illness  Onset date: (R)ankle 2022;  (L) knee  after cast removal.  Mechanism of injury: January shortly after cast removal on (R) ankle - Going up stairs, heard a loud pop I n(L) knee - fell - Persistent knee pain (L) knee-   Steroid pack - feeling better now -   Injections only temp relief -         Per MD note 3/8/2023:  \"She not doing well with it.  Is been bothering her now for few months.  We did a cortisone shot about 3 weeks ago and initially did very well but she is not lasting and she continues to have some swelling.  She also has pain.  She has a very busy spring schedule as well. \"      Patient Occupation: Very active - graduations, competitions, etc  Pain  Current pain ratin  At worst pain rating: 10  Location: (R) ANkle swelling; lateral ankle - even to touch;  (L) knee anterior and medial and very tender medial jt   Quality: Sharp to touch; Ache, throbbing;   Alleviating factors: Ice, rest, Meds;   Exacerbated by: Walking, stairs, squatting, twisting,     Diagnostic Tests  X-ray: abnormal  MRI studies: abnormal    Treatments  Current treatment: injection treatment and medication  Patient Goals  Patient/family treatment goals: Pain alleviation; Mobility, strength, endurance to allow ADLs, active lifestyle, etc.       Per MD Note 3/08/2023:  Radiology:   Her previous x-ray were AP and lateral supine x-rays in the hospital repeated AP lateral 40 degree PA and sunrise and weightbearing views today in the office shows fairly advanced varus osteoarthritis but with evidence of tricompartmental changes with osteophytes best seen on the " lateral view.  She had an MRI of her knee as well which shows a discoid lateral meniscus.  Also says she has subtle changes consistent with this small tear posterior root medial meniscus but nothing large or displaced or extruded  ___________________________________________________  Objective          Observations     Additional Knee Observation Details  (L) Knee swollen anterior, ant/med;  (R) ankle mildly swollen;   Antalgic gait LLE -   No AD -     Tenderness     Additional Tenderness Details  (L) Knee;  Tender around PF Jt; Medial jt line, Pes anserine region  (R) ANKle;  ATFL, Calc-fib lig    Active Range of Motion     Additional Active Range of Motion Details  WNL Ankle and Knee     Strength/Myotome Testing     Left Knee   Flexion: 4  Extension: 4  Quadriceps contraction: fair    Right Knee   Normal strength    Additional Strength Details  (R) Ankle: WNL, but EV 4/5;    Tests     Additional Tests Details  (+) Step up / Squat test PF Jt (L)  (-) ACL, PCL, MCL/LCL, Med/Lat Meniscus  (R) Ankle: Pain w/ Inv stress; (-) Ant Drawer        See Treatment Flow sheet for Exercises, Manual therapy, and modalities.   FUNCTIONAL ACTIVITIES:   · TAPING / BRACIN) K-tape to unload PF Jt; and unload medial knee (L); 2) K-tape w/ leukotape over to prevent inversion stress and support medial arch  · Discussed use of lace up ankle brace w/ fig-8 straps and fitted for one and assisted her in ordering proper size, etc  · Anatomy / Dysfunction education  · Jt protection, ADL modification instruction    ___________________________________________________  Assessment & Plan     Assessment    Assessment details: 62 yo female: (R) Ankle fx 2022;  (L) knee pain / OA, etc  Much less pain and improved gait and functional activities w/ knee and ankle taped -     PROBLEMS: Pain; Limited mobility; abnormal gait; weakness, impaired functional ability  PROGNOSIS: Fair/good    GOALS:   SHORT TERM GOALS: 2 weeks:  1) HEP Initiated; 2) Pain  decreased 50%:   3) AROM  Increased (L) knee /(R) ankle to WFL:  4) Improved gait /functional ability with knee taping ;     LONG TERM GOALS: 4 weeks (or at time of DISCHARGE): 1) (I) HEP; 2) AROM WFL and pain free; 3) Strength / mobility to be able to perform all ADL's and job-related activities w/o restrictions;4) Pt able to ambulate functional distances w/ taping and/or AD safely -        Plan  Planned therapy interventions: flexibility, home exercise program, soft tissue mobilization, strengthening, stretching, therapeutic activities and transfer training (Modalities prn)  Frequency: 2x week  Duration in weeks: 4  Treatment plan discussed with: patient      ___________________________________________________  Manual Therapy:         mins  82832;  Therapeutic Exercise:    20     mins  80569;     Neuromuscular Ivan:        mins  49945;    Therapeutic Activity:     30     mins  82092;   Self Care:                           mins  05371    Eval:   30   mins    Timed Treatment:   50   mins                  Total Treatment:     70   mins    PT SIGNATURE:     Evelio Dumont, PT  DATE TREATMENT INITIATED: 3/15/2023  ___________________________________________________  Initial Certification  Certification Period: 6/12/2023  I certify that the therapy services are furnished while this patient is under my care.  The services outlined above are required by this patient, and will be reviewed every 90 days.     PHYSICIAN: ________________________________  DATE: ______  Tomás Rasmussen MD        Please sign and return via fax to [phone].. Thank you, [facility].  ______________________________________________________________________  [id number] [place] [address]  Phone: [phone] Fax: [phone]

## 2023-03-21 ENCOUNTER — TREATMENT (OUTPATIENT)
Dept: PHYSICAL THERAPY | Facility: CLINIC | Age: 63
End: 2023-03-21
Payer: MEDICARE

## 2023-03-21 DIAGNOSIS — Z87.81 HISTORY OF FRACTURE OF RIGHT ANKLE: ICD-10-CM

## 2023-03-21 DIAGNOSIS — M17.12 ARTHRITIS OF LEFT KNEE: Primary | ICD-10-CM

## 2023-03-21 PROCEDURE — 97530 THERAPEUTIC ACTIVITIES: CPT | Performed by: PHYSICAL THERAPIST

## 2023-03-21 PROCEDURE — 97110 THERAPEUTIC EXERCISES: CPT | Performed by: PHYSICAL THERAPIST

## 2023-03-21 NOTE — PROGRESS NOTES
Physical Therapy Daily Note    Patient Name: Alisha Connolly         :  1960  Referring Physician: Tomás Rasmussen MD  Treatment Date: 3/21/2023  Date of Initial Visit: Type: THERAPY  Noted: 3/15/2023    Visit Diagnoses:    ICD-10-CM ICD-9-CM   1. Arthritis of left knee  M17.12 716.96   2. History of fracture of right ankle  Z87.81 V15.51       _____________________________________________________    Subjective   Alisha Connolly reports: tape very helpful in alleviating (L) knee pain - able to walk better and even do some steps reciprocally - Taping of ankle helpful, but became uncomfortable to sleep, so removed same night - Ankle swelling today - on feet a lot today -   More pain (L) knee w/o tape today     Objective   Pt ambulating w/ mildly antalgic gait LLE w/o tape -     See Exercise, Manual, and Modality Logs for complete treatment.     Functional / Therapeutic Activities:    · TAPING / BRACING: K-tape to 1) unload PF jt x 2 layers; 2) Unload medial knee x 2 strips;  · Fitted Pt with and instructed Pt in donning/doffing lace up ankle brace w/ fig 8 straps and instructed in wearing schedule -  · Functional activities -   · Jt protection, ADL modification; Posture and      Assessment/Plan  62 yo female: (R) Ankle fx 2022;  (L) knee pain / OA, etc  Much less pain and improved gait and functional activities w/ knee and ankle brace     Progress strengthening /stabilization /functional activity - brace prn; tape with (L) LE activities       _________________________________________________    Manual Therapy:                 mins  38920;  Therapeutic Exercise:    28    mins  03305;     Neuromuscular Ivan:        mins  37401;    Therapeutic Activity:     25      mins  28753;     Ultrasound:                          mins  55269;  Iontophoresis:                     mins  93789;    Electrical Stimulation:         mins  98793 ( );  Mechanical Traction:          mins  90385  Dry Needling                        mins self-pay     Timed Treatment:   53    mins                  Total Treatment:     53    mins        Evelio Dumont, PT  Physical Therapist  Lic # [id number]

## 2023-03-23 ENCOUNTER — TREATMENT (OUTPATIENT)
Dept: PHYSICAL THERAPY | Facility: CLINIC | Age: 63
End: 2023-03-23
Payer: MEDICARE

## 2023-03-23 DIAGNOSIS — Z87.81 HISTORY OF FRACTURE OF RIGHT ANKLE: ICD-10-CM

## 2023-03-23 DIAGNOSIS — M17.12 ARTHRITIS OF LEFT KNEE: Primary | ICD-10-CM

## 2023-03-23 PROCEDURE — 97530 THERAPEUTIC ACTIVITIES: CPT | Performed by: PHYSICAL THERAPIST

## 2023-03-23 PROCEDURE — 97110 THERAPEUTIC EXERCISES: CPT | Performed by: PHYSICAL THERAPIST

## 2023-03-23 NOTE — PATIENT INSTRUCTIONS
Continue HEP  Keep tape on unless skin burns or itches or increases pain.  Brace for ankle when very active or on uneven ground -

## 2023-03-26 NOTE — PROGRESS NOTES
Physical Therapy Daily Note    Patient Name: Alisha Connolly         :  1960  Referring Physician: Tomás Rasmussen MD  Treatment Date: 3/26/2023  Date of Initial Visit: Type: THERAPY  Noted: 3/15/2023    Visit Diagnoses:    ICD-10-CM ICD-9-CM   1. Arthritis of left knee  M17.12 716.96   2. History of fracture of right ankle  Z87.81 V15.51       _____________________________________________________    Subjective   Alisha Connolly reports: taping very helpful in decreasing her anterior and medial knee pain - medial knee pain still present, but less - Able to go up / down about 3 steps before medial knee pain / feeling of giving way increases and she has to return to one step at a time, but that's an improvement - Ankle doing well (R) - brace feels good when she needs to use it -   Tape started sticking to her clothes at night and pulling it off -     Objective   Pt presents w/ tape partially removed (had started coming off yesterday) - PT removed tape - Tender along medial joint line and pes region -   Pt demonstrated reciprocal gait up stairs w/ weakness on (L), but able to do about 3 steps before medial knee pain got too bad despite tape -     See Exercise, Manual, and Modality Logs for complete treatment.     Functional / Therapeutic Activities:    TAPING / BRACING: Activities:    · TAPING / BRACING: K-tape to 1) unload PF jt x 2 layers; 2) Unload medial knee x 2 strips;  · SEE EXERCISE FLOW SHEET - ]  · Assessed gait on stairs -   · Reviewed Jt protection, ADL modification; to reduce pain / stress on (L) knee -     Assessment/Plan  62 yo female: (R) Ankle fx 2022;  (L) knee pain (mild medial OA, PF OA per Xrays) possible medial meniscus involvement due to persistent medial knee pain -   May benefit from further assessment (I.e. MRI, etc) if pain persists despite PT -   Decreased  pain and improved gait and functional activities w/ knee taping -    Progress strengthening /stabilization /functional activity and  taping prn -        _________________________________________________    Manual Therapy:                 mins  17485;  Therapeutic Exercise:   25    mins  41148;     Neuromuscular Ivan:   3     mins  19713;    Therapeutic Activity:     25      mins  99680;     Ultrasound:                          mins  86480;  Iontophoresis:                     mins  15081;    Electrical Stimulation:         mins  12026 ( );  Mechanical Traction:          mins  08421  Dry Needling                       mins self-pay     Timed Treatment:   53    mins                  Total Treatment:     53    mins        Evelio Dumont PT  Physical Therapist  Lic # [id number]

## 2023-03-27 ENCOUNTER — TREATMENT (OUTPATIENT)
Dept: PHYSICAL THERAPY | Facility: CLINIC | Age: 63
End: 2023-03-27
Payer: MEDICARE

## 2023-03-27 DIAGNOSIS — Z87.81 HISTORY OF FRACTURE OF RIGHT ANKLE: ICD-10-CM

## 2023-03-27 DIAGNOSIS — M17.12 ARTHRITIS OF LEFT KNEE: Primary | ICD-10-CM

## 2023-03-27 PROCEDURE — 97110 THERAPEUTIC EXERCISES: CPT | Performed by: PHYSICAL THERAPIST

## 2023-03-27 PROCEDURE — 97530 THERAPEUTIC ACTIVITIES: CPT | Performed by: PHYSICAL THERAPIST

## 2023-03-27 NOTE — PROGRESS NOTES
"Physical Therapy Daily Note    Patient Name: Alisha Connolly         :  1960  Referring Physician: Tomás Rasmussen MD  Treatment Date: 3/27/2023  Date of Initial Visit: No linked episodes    Visit Diagnoses:    ICD-10-CM ICD-9-CM   1. Arthritis of left knee  M17.12 716.96   2. History of fracture of right ankle  Z87.81 V15.51       _____________________________________________________    Subjective   Alisha Connolly reports: no new problems - lateral knee pain radiating down lateral lower leg after going to [employer] and pivoting on LLE noting pain - Has had increased swelling into lower leg and ankle (L) since -   Taping very helpful in reducing her pain and allowing her to walk and even able to do a complete flight of stairs reciprocally before pain started (used to only be able to do a few steps a week ago -     Objective   Pt ambulating w/ increased WB-ing LLE   (L) Lower leg /ankle swollen vs (R)  Very tender at proximal tib-fib joint (L)  Taping to stabilize the proximal tib fib joint allowed Alisha to ambulate and do most of her exercises w/o pain before other taping applied b/c her knee was feeling unstable -     See Exercise, Manual, and Modality Logs for complete treatment.     Functional / Therapeutic Activities:    • TAPING / BRACING: K-tape to 1) unload PF jt x 2 layers; 2) Unload medial knee x 2 strips;  - Hypofix w/ Leukotape x 3 strips to stabilize Proximal fibula -   · SEE EXERCISE FLOW SHEET -  · Jt protection, ADL modification; Posture and      Assessment/Plan  62 yo female: (R) Ankle fx 2022;  (L) knee pain (mild medial OA, PF OA per Xrays) possible medial meniscus involvement due to persistent medial knee pain -   May benefit from further assessment (I.e. MRI, etc) if pain persists despite PT -   Decreased  pain and improved gait and functional activities w/ knee taping -    Onset of \"lateral knee pain\" and radiating pain down lateral lower leg may be from Proximal Tib/fib " joint (irritation of peroneal nerve?)from twisting at mall? - this along w/ knee causing increased swelling -  Taping to stabilize Prox tib-fib joint greatly reduced her pain and may lead to decreasing lateral lower leg pain  -    Progress strengthening /stabilization /functional activity and taping prn -      _________________________________________________    Manual Therapy:                 mins  79196;  Therapeutic Exercise:   20    mins  74014;     Neuromuscular Ivan:        mins  67863;    Therapeutic Activity:     25      mins  32289;     Ultrasound:                          mins  17618;  Iontophoresis:                     mins  51744;    Electrical Stimulation:         mins  79857 ( );     Timed Treatment:   45    mins                  Total Treatment:     45    mins      Evelio Dumont PT  Physical Therapist  Lic # [id number]

## 2023-03-28 ENCOUNTER — OFFICE VISIT (OUTPATIENT)
Dept: SURGERY | Facility: CLINIC | Age: 63
End: 2023-03-28
Payer: MEDICARE

## 2023-03-28 VITALS
BODY MASS INDEX: 38.2 KG/M2 | HEART RATE: 77 BPM | HEIGHT: 63 IN | OXYGEN SATURATION: 98 % | SYSTOLIC BLOOD PRESSURE: 140 MMHG | WEIGHT: 215.6 LBS | TEMPERATURE: 97.8 F | DIASTOLIC BLOOD PRESSURE: 88 MMHG

## 2023-03-28 DIAGNOSIS — K64.8 INTERNAL HEMORRHOIDS WITH COMPLICATION: ICD-10-CM

## 2023-03-28 DIAGNOSIS — K60.2 ANAL FISSURE: Primary | ICD-10-CM

## 2023-03-28 DIAGNOSIS — K64.4 EXTERNAL HEMORRHOIDS WITH COMPLICATION: ICD-10-CM

## 2023-03-28 PROCEDURE — 1160F RVW MEDS BY RX/DR IN RCRD: CPT | Performed by: PHYSICIAN ASSISTANT

## 2023-03-28 PROCEDURE — 1159F MED LIST DOCD IN RCRD: CPT | Performed by: PHYSICIAN ASSISTANT

## 2023-03-28 PROCEDURE — 3079F DIAST BP 80-89 MM HG: CPT | Performed by: PHYSICIAN ASSISTANT

## 2023-03-28 PROCEDURE — 99214 OFFICE O/P EST MOD 30 MIN: CPT | Performed by: PHYSICIAN ASSISTANT

## 2023-03-28 PROCEDURE — 3077F SYST BP >= 140 MM HG: CPT | Performed by: PHYSICIAN ASSISTANT

## 2023-03-28 RX ORDER — HYDROCORTISONE 25 MG/G
CREAM TOPICAL
Qty: 30 G | Refills: 1 | Status: SHIPPED | OUTPATIENT
Start: 2023-03-28

## 2023-03-28 NOTE — PROGRESS NOTES
"Alisha Connolly is a 63 y.o. female in for follow up of anal fissure, internal and external hemorrhoids status post exam under anesthesia, chemical sphincterotomy with Botox on 2/9/2023.    Patient states that she is the most comfortable that she has been in 2 years. Still has left upper hemorrhoid that flares up once in a while.  It has not bothered her for about a week.  She has been consistent with fiber and laxative tea; still trying to find the right combination.  Feels her bowel movements are under good control.  She states that if her hemorrhoid is flared, it is more painful to have a BM, and she has to strain more and occasionally push externally to help evacuate the rectum completely.  She notes occasional significant rectal bleeding.    /88 (BP Location: Left arm, Patient Position: Sitting, Cuff Size: Large Adult)   Pulse 77   Temp 97.8 °F (36.6 °C) (Temporal)   Ht 160 cm (63\")   Wt 97.8 kg (215 lb 9.6 oz)   LMP  (LMP Unknown)   SpO2 98%   Breastfeeding No   BMI 38.19 kg/m²   Body mass index is 38.19 kg/m².  -  Physical Exam  Normal appearance. No acute distress.    Assessment:   1. Anal fissure    2. Internal hemorrhoids with complication    3. External hemorrhoids with complication    - fissure resolved  - occasional residual hemorrhoid flares    Plan:  • Pt feels her symptoms are under good control. She does not want to pursue surgical treatment of hemorrhoids at this time.  • Continue preferred combination of laxative tea and/or fiber, as well as hydrocortisone cream for hemorrhoid flares. Prescription sent.  • Follow up as needed for persistent or worsening symptoms        Rosalind Richard PA-C  Physician Assistant  Colorectal Surgery      "

## 2023-03-29 ENCOUNTER — TREATMENT (OUTPATIENT)
Dept: PHYSICAL THERAPY | Facility: CLINIC | Age: 63
End: 2023-03-29
Payer: MEDICARE

## 2023-03-29 DIAGNOSIS — Z87.81 HISTORY OF FRACTURE OF RIGHT ANKLE: ICD-10-CM

## 2023-03-29 DIAGNOSIS — M17.12 ARTHRITIS OF LEFT KNEE: Primary | ICD-10-CM

## 2023-03-29 PROCEDURE — 97530 THERAPEUTIC ACTIVITIES: CPT | Performed by: PHYSICAL THERAPIST

## 2023-03-29 PROCEDURE — 97110 THERAPEUTIC EXERCISES: CPT | Performed by: PHYSICAL THERAPIST

## 2023-03-29 NOTE — PROGRESS NOTES
Physical Therapy Daily Note    Patient Name: Alisha Connolly         :  1960  Referring Physician: Tomás Rasmussen MD  Treatment Date: 3/29/2023  Date of Initial Visit: Type: THERAPY  Noted: 3/15/2023    Visit Diagnoses:    ICD-10-CM ICD-9-CM   1. Arthritis of left knee  M17.12 716.96   2. History of fracture of right ankle  Z87.81 V15.51       _____________________________________________________    Subjective   Alisha Connolly reports: increased pain after last session w/o tape to knee w/ exercises swelling into ankle- Taping of fibular head helpful initially got too tight and took off last night and felt better -   Increased pain /swelling since incident at [employer] - Taping of knee cap, etc makes her knee feel so much more stable and much less painful -      Objective   Swelling anterior/inf knee and into lower leg (L)   Tender infrapatellar region and around knee cap and medial joint line -     See Exercise, Manual, and Modality Logs for complete treatment.     Functional / Therapeutic Activities:    · TAPING / BRACING: K-tape to n1) Unload PF jt x 2 layers; 2) Unload medial joint x 2 strips to alleviate pain and allow improved function, gait, and tolerance to PT/TE/TA and facilitate improved quad/VMO firing by alleviating pain -   · SEE EXERCISE FLOW SHEET -   · Jt protection, ADL modification; Posture and      Assessment/Plan  64 yo female: (R) Ankle fx 2022;  (L) knee pain (mild medial OA, PF OA per Xrays) possible medial meniscus involvement due to persistent medial knee pain -   May benefit from further assessment (I.e. MRI, etc) if pain persists despite PT -   Decreased  pain and improved gait and functional activities w/ knee taping -    More pain and swelling w/o taping of knee w/ exercise -  Pt felt much better today w/ activities and after with taping prior -     Progress strengthening /stabilization /functional activity        _________________________________________________    Manual Therapy:                mins  44359;  Therapeutic Exercise:    28    mins  96816;     Neuromuscular Ivan:        mins  73339;    Therapeutic Activity:     15      mins  34383;     Ultrasound:                          mins  82734;  Iontophoresis:                     mins  70447;    Electrical Stimulation:         mins  28978 ( );  Mechanical Traction:          mins  33674  Dry Needling                       mins self-pay     Timed Treatment:   43    mins                  Total Treatment:     43    mins        Evelio Dumont PT  Physical Therapist  Lic # [id number]

## 2023-04-05 ENCOUNTER — TELEPHONE (OUTPATIENT)
Dept: ORTHOPEDIC SURGERY | Facility: CLINIC | Age: 63
End: 2023-04-05
Payer: MEDICARE

## 2023-04-05 ENCOUNTER — HOSPITAL ENCOUNTER (OUTPATIENT)
Dept: CARDIOLOGY | Facility: HOSPITAL | Age: 63
Discharge: HOME | End: 2023-04-05
Admitting: NURSE PRACTITIONER
Payer: MEDICARE

## 2023-04-05 DIAGNOSIS — M79.605 PAIN AND SWELLING OF LEFT LOWER EXTREMITY: ICD-10-CM

## 2023-04-05 DIAGNOSIS — M79.89 PAIN AND SWELLING OF LEFT LOWER EXTREMITY: Primary | ICD-10-CM

## 2023-04-05 DIAGNOSIS — M79.605 PAIN AND SWELLING OF LEFT LOWER EXTREMITY: Primary | ICD-10-CM

## 2023-04-05 DIAGNOSIS — M79.89 PAIN AND SWELLING OF LEFT LOWER EXTREMITY: ICD-10-CM

## 2023-04-05 LAB
BH CV LOWER VASCULAR LEFT COMMON FEMORAL AUGMENT: NORMAL
BH CV LOWER VASCULAR LEFT COMMON FEMORAL COMPETENT: NORMAL
BH CV LOWER VASCULAR LEFT COMMON FEMORAL COMPRESS: NORMAL
BH CV LOWER VASCULAR LEFT COMMON FEMORAL PHASIC: NORMAL
BH CV LOWER VASCULAR LEFT COMMON FEMORAL SPONT: NORMAL
BH CV LOWER VASCULAR LEFT DISTAL FEMORAL COMPRESS: NORMAL
BH CV LOWER VASCULAR LEFT GASTRONEMIUS COMPRESS: NORMAL
BH CV LOWER VASCULAR LEFT GREATER SAPH AK COMPRESS: NORMAL
BH CV LOWER VASCULAR LEFT GREATER SAPH BK COMPRESS: NORMAL
BH CV LOWER VASCULAR LEFT LESSER SAPH COMPRESS: NORMAL
BH CV LOWER VASCULAR LEFT MID FEMORAL AUGMENT: NORMAL
BH CV LOWER VASCULAR LEFT MID FEMORAL COMPETENT: NORMAL
BH CV LOWER VASCULAR LEFT MID FEMORAL COMPRESS: NORMAL
BH CV LOWER VASCULAR LEFT MID FEMORAL PHASIC: NORMAL
BH CV LOWER VASCULAR LEFT MID FEMORAL SPONT: NORMAL
BH CV LOWER VASCULAR LEFT PERONEAL COMPRESS: NORMAL
BH CV LOWER VASCULAR LEFT POPLITEAL AUGMENT: NORMAL
BH CV LOWER VASCULAR LEFT POPLITEAL COMPETENT: NORMAL
BH CV LOWER VASCULAR LEFT POPLITEAL COMPRESS: NORMAL
BH CV LOWER VASCULAR LEFT POPLITEAL PHASIC: NORMAL
BH CV LOWER VASCULAR LEFT POPLITEAL SPONT: NORMAL
BH CV LOWER VASCULAR LEFT POSTERIOR TIBIAL COMPRESS: NORMAL
BH CV LOWER VASCULAR LEFT PROFUNDA FEMORAL COMPRESS: NORMAL
BH CV LOWER VASCULAR LEFT PROXIMAL FEMORAL COMPRESS: NORMAL
BH CV LOWER VASCULAR LEFT SAPHENOFEMORAL JUNCTION COMPRESS: NORMAL
BH CV LOWER VASCULAR RIGHT COMMON FEMORAL AUGMENT: NORMAL
BH CV LOWER VASCULAR RIGHT COMMON FEMORAL COMPETENT: NORMAL
BH CV LOWER VASCULAR RIGHT COMMON FEMORAL COMPRESS: NORMAL
BH CV LOWER VASCULAR RIGHT COMMON FEMORAL PHASIC: NORMAL
BH CV LOWER VASCULAR RIGHT COMMON FEMORAL SPONT: NORMAL
MAXIMAL PREDICTED HEART RATE: 155 BPM
STRESS TARGET HR: 132 BPM

## 2023-04-05 PROCEDURE — 93971 EXTREMITY STUDY: CPT

## 2023-04-05 NOTE — TELEPHONE ENCOUNTER
I put in an order for a STAT doppler, given she had a history of an ankle fracture back in December and has more than swelling of the knee I would like to rule out blood clot then we can try other remedies for the swelling if there is not blood clots. The office will call her to let her know when this ultrasound will be. Thank you!

## 2023-04-05 NOTE — TELEPHONE ENCOUNTER
Caller: Alisha Connolly    Relationship to patient: Self    Best call back number: [phone]    Patient is needing: PATIENT IS EXPERIENCING BURNING SWELLING, INTENSE PAIN IN LEFT LEG.

## 2023-04-05 NOTE — TELEPHONE ENCOUNTER
Did she have any new injuries since her last visit? Is the pain around the knee or the entire leg, is the calf swollen and tender? Did she complete the Medrol dose pack that was prescribed 3/8/2023 and then start the Meloxicam?

## 2023-04-05 NOTE — TELEPHONE ENCOUNTER
Patient states its been swelling around the the entire leg and aching. Patient states when she is up the ankle swells. Patient states she hasnt been to PT this week. Patient states she doesn't seem to see a difference with the meloxicam. Patient is a diabetic and is worried she wont be able to get the swelling down. Please review and advise [phone]

## 2023-04-05 NOTE — TELEPHONE ENCOUNTER
Please call the patient and let her know there was no blood clot. We can now work on some things to try to improve the swelling. She needs to be icing and elevating the knee whenever she is sitting down, above the level of the heart, so lay down is the best. Next she can try some compression socks to help reduce the swelling in the leg. Thank you!

## 2023-04-10 ENCOUNTER — TREATMENT (OUTPATIENT)
Dept: PHYSICAL THERAPY | Facility: CLINIC | Age: 63
End: 2023-04-10
Payer: MEDICARE

## 2023-04-10 DIAGNOSIS — M17.12 ARTHRITIS OF LEFT KNEE: Primary | ICD-10-CM

## 2023-04-10 DIAGNOSIS — Z87.81 HISTORY OF FRACTURE OF RIGHT ANKLE: ICD-10-CM

## 2023-04-10 PROCEDURE — 97530 THERAPEUTIC ACTIVITIES: CPT | Performed by: PHYSICAL THERAPIST

## 2023-04-10 PROCEDURE — 97140 MANUAL THERAPY 1/> REGIONS: CPT | Performed by: PHYSICAL THERAPIST

## 2023-04-10 NOTE — PROGRESS NOTES
Physical Therapy Daily Note    Patient Name: Alisha Connolly         :  1960  Referring Physician: Tomás Rasmussen MD  Treatment Date: 4/10/2023  Date of Initial Visit: Type: THERAPY  Noted: 3/15/2023    Visit Diagnoses:    ICD-10-CM ICD-9-CM   1. Arthritis of left knee  M17.12 716.96   2. History of fracture of right ankle  Z87.81 V15.51       _____________________________________________________    Subjective   Alisha Connolly reports: Compression socks - knee constant ache and increased pain - into lower leg and pain up into hip - up with family events a lot -   Called MD called a dopler due to increased swelling and pain in leg - Doppler was negative -Doppler done 2023 -   This pain and swelling has been progressively worse over the last couple of weeks - despite no ca  Objective   (L) Knee significantly swollen - wearing compression hose on lower legs -   Knee AROM WF/NL - Antalgic gait LLE - Tender medial joint line, infrapatellar region -   Pain w/ squat, step, twisting - Warm along medial , ant/med knee joint line -     See Exercise, Manual, and Modality Logs for complete treatment.-      Functional / Therapeutic Activities:    · TAPING / BRACING: K-tape to 1) Unload PF Jt; 2) 2 over-lapping layers to unload ANT/LAT, ANT/MED knee for pain and swelling reduction - and improve functional ability -   · Discussed at length symptoms, possible causes, discussion of options - ways to control swelling, pain, modifications of ADLs,   · Discussed wrapping LE / Knee w/ wide ace wrap at an angle distal to prox to reduce swelling / control pain   · Knee assessment -      Assessment/Plan  62 yo female: (R) Ankle fx 2022;  (L) knee pain (mild medial OA, PF OA per Xrays) possible medial meniscus involvement due to persistent medial knee pain -   Sudden onset of progressively worsening knee / lower leg pain / swelling over last couple of weeks w/ no known cause after initially responding well to PT - Apparently (-)  Saundra Brito  benefit from further assessment by referring Physician ASAP --     Pt to contact MD about early to be assessed - to continue NWB HEP for now -  Resume regular program as pain/swelling allow -        _________________________________________________    Manual Therapy:            10     mins  21717;  Therapeutic Exercise:    05    mins  48219;     Neuromuscular Ivan:        mins  84551;    Therapeutic Activity:     28     mins  98693;     Ultrasound:                          mins  36647;  Iontophoresis:                     mins  81744;    Electrical Stimulation:         mins  59690 ( );  Mechanical Traction:          mins  57554  Dry Needling                       mins self-pay     Timed Treatment:   43    mins                  Total Treatment:     43    mins        Evelio Dumont, PT  Physical Therapist  Lic # [id number]

## 2023-04-12 ENCOUNTER — TREATMENT (OUTPATIENT)
Dept: PHYSICAL THERAPY | Facility: CLINIC | Age: 63
End: 2023-04-12
Payer: MEDICARE

## 2023-04-12 DIAGNOSIS — Z87.81 HISTORY OF FRACTURE OF RIGHT ANKLE: ICD-10-CM

## 2023-04-12 DIAGNOSIS — M17.12 ARTHRITIS OF LEFT KNEE: Primary | ICD-10-CM

## 2023-04-12 PROCEDURE — 97530 THERAPEUTIC ACTIVITIES: CPT | Performed by: PHYSICAL THERAPIST

## 2023-04-12 PROCEDURE — 97140 MANUAL THERAPY 1/> REGIONS: CPT | Performed by: PHYSICAL THERAPIST

## 2023-04-12 PROCEDURE — 97110 THERAPEUTIC EXERCISES: CPT | Performed by: PHYSICAL THERAPIST

## 2023-04-12 NOTE — PROGRESS NOTES
Physical Therapy Daily Note    Patient Name: Alisha Connolly         :  1960  Referring Physician: Tomás Rasmussen MD  Treatment Date: 2023  Date of Initial Visit: Type: THERAPY  Noted: 3/15/2023    Visit Diagnoses:    ICD-10-CM ICD-9-CM   1. Arthritis of left knee  M17.12 716.96   2. History of fracture of right ankle  Z87.81 V15.51       _____________________________________________________    Subjective   Alisha Connolly reports: had to take tape off that night due to increased pressure - Been icing every morning before getting out of bed which as helped - Less swellin - can only do stairs one foot at a time leading w/ (R) leg -   Pain mostly ant/med, medial, lateral thigh, lower leg (L) -   Up and doing a lot of things -   Knee aching wakes her up at night - and being up on it, stairs reciprocally, in dependant position -     Objective   Grossly less swelling- grossly decreased swelling, but noticed swelling ant/medial knee -     See Exercise, Manual, and Modality Logs for complete treatment.     Functional / Therapeutic Activities:    · TAPING / BRACING: NA  · Discussed / reviewed Jt protection, ADL modification; and HEP modifications to avoid pain flare-ups, etc  · Discussed activitiy modifications to include periods of rest in-between activities -     Assessment/Plan  62 yo female: (R) Ankle fx 2022;  (L) knee pain (mild medial OA, PF OA per Xrays) possible medial meniscus involvement due to persistent medial knee pain -   Sudden onset of progressively worsening knee / lower leg pain / swelling over last couple of weeks w/ no known cause after initially responding well to PT - Apparently (-) Doppler -    Alisha  benefit from further assessment by referring Physician     Progress per Plan of Care gentle progression of strengthening, etc as pain/swelling allows -        _________________________________________________    Manual Therapy:            08     mins  42112;  Therapeutic Exercise:    23     mins  48950;     Neuromuscular Ivan:        mins  39244;    Therapeutic Activity:     10      mins  79780;     Ultrasound:                          mins  54054;  Iontophoresis:                     mins  55644;    Electrical Stimulation:         mins  62968 ( );  Mechanical Traction:          mins  07595  Dry Needling                       mins self-pay     Timed Treatment:   41    mins                  Total Treatment:     41    mins        Evelio Dumont PT  Physical Therapist  Lic # [id number]

## 2023-04-18 ENCOUNTER — TREATMENT (OUTPATIENT)
Dept: PHYSICAL THERAPY | Facility: CLINIC | Age: 63
End: 2023-04-18
Payer: MEDICARE

## 2023-04-18 DIAGNOSIS — M17.12 ARTHRITIS OF LEFT KNEE: Primary | ICD-10-CM

## 2023-04-18 DIAGNOSIS — Z87.81 HISTORY OF FRACTURE OF RIGHT ANKLE: ICD-10-CM

## 2023-04-18 PROCEDURE — 97110 THERAPEUTIC EXERCISES: CPT | Performed by: PHYSICAL THERAPIST

## 2023-04-18 PROCEDURE — 97530 THERAPEUTIC ACTIVITIES: CPT | Performed by: PHYSICAL THERAPIST

## 2023-04-18 NOTE — PROGRESS NOTES
Physical Therapy Daily Note    Patient Name: Alisha Connolly         :  1960  Referring Physician: Tomás Rasmussen MD  Treatment Date: 2023  Date of Initial Visit: Type: THERAPY  Noted: 3/15/2023    Visit Diagnoses:    ICD-10-CM ICD-9-CM   1. Arthritis of left knee  M17.12 716.96   2. 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     Functional / Therapeutic Activities:    · TAPING / BRACING: NA  · SEE EXERCISE FLOW SHEET -   · Jt protection, ADL modification; Posture and      Assessment/Plan  64 yo female: (R) Ankle fx 2022;  (L) knee pain (mild medial OA, PF OA per Xrays) possible medial meniscus involvement due to persistent medial knee pain -   Sudden onset of progressively worsening knee / lower leg pain / swelling over last couple of weeks w/ no known cause after initially responding well to PT - Apparently (-) Doppler -    Alisha may  benefit from further assessment by referring Physician if pain continues to flare w/ activities -      Progress per Plan of Care gentle progression of strengthening, etc as pain/swelling allows -      _________________________________________________     Manual Therapy:                mins  85733;  Therapeutic Exercise:   28    mins  13559;     Neuromuscular Ivna:        mins   56307;    Therapeutic Activity:     10      mins  32203;     Ultrasound:                          mins  84396;  Iontophoresis:                     mins  11332;    Electrical Stimulation:         mins  76631 ( );  Mechanical Traction:          mins  72931  Dry Needling                       mins self-pay     Timed Treatment:   38    mins                  Total Treatment:     38    mins      Evelio Dumont PT  Physical Therapist  Lic # [id number]

## 2023-04-20 ENCOUNTER — TREATMENT (OUTPATIENT)
Dept: PHYSICAL THERAPY | Facility: CLINIC | Age: 63
End: 2023-04-20
Payer: MEDICARE

## 2023-04-20 DIAGNOSIS — Z87.81 HISTORY OF FRACTURE OF RIGHT ANKLE: ICD-10-CM

## 2023-04-20 DIAGNOSIS — M17.12 ARTHRITIS OF LEFT KNEE: Primary | ICD-10-CM

## 2023-04-20 PROCEDURE — 97530 THERAPEUTIC ACTIVITIES: CPT | Performed by: PHYSICAL THERAPIST

## 2023-04-20 PROCEDURE — 97110 THERAPEUTIC EXERCISES: CPT | Performed by: PHYSICAL THERAPIST

## 2023-04-20 NOTE — PROGRESS NOTES
Physical Therapy Daily Note    Patient Name: Alisha Connolly         :  1960  Referring Physician: Tomás Rasmussen MD  Treatment Date: 2023  Date of Initial Visit: Type: THERAPY  Noted: 3/15/2023    Visit Diagnoses:    ICD-10-CM ICD-9-CM   1. Arthritis of left knee  M17.12 716.96   2. History of fracture of right ankle  Z87.81 V15.51       _____________________________________________________    Subjective   Alisha Connolly reports: Hip pain (L)   Pain when getting up from sitting -  Pain 10/10 at night and in AM deep in knee medial and lateral knee and up into lateral hip (L) - Hip pain worse over last month - at initial injury and her knee popped w/ medial knee pain thru her knee and up her lateral thigh to hip -   Pt very upset that she is unable to get another appointment until late May - Feels it is not fair and promotes bad care since she would have to go w/o PT for over a month - very upset about this fact and how scheduling is handled -   To see Dr. Rasmussen next week and will tell him about this problem -   Modified PT activities helpful - not having increased pain during or after PT -   Pain worse at night and first thing in AM -     Objective   Very tender medial knee joint, lateral knee joint, up ITB and GT (L) hip  Difficult to assess hip scour, bobby due to knee pain -    See Exercise, Manual, and Modality Logs for complete treatment.     Functional / Therapeutic Activities:    · TAPING / BRACING: NA  · Discussed exercise modifications / additions to prevent irritation to knee/hip  · Assessed (L) Hip -   · Discussed stretching/ rom exercises before getting out of bed     Assessment/Plan  64 yo female: (R) Ankle fx 2022;  (L) knee pain (mild medial OA, PF OA per Xrays) possible medial meniscus involvement due to persistent medial knee pain -   Sudden onset of progressively worsening knee / lower leg pain / swelling over last couple of weeks w/ no known cause after initially responding well to PT -  Apparently (-) Doppler -    Alisha would benefit from further assessment by referring Physician due to pain pain worsening at night and in AM including hip now as well -      Progress per Plan of Care gentle progression of strengthening, etc as pain/swelling allows -modify as needed - add Hip / ITB stretching next session -       _________________________________________________     Manual Therapy:                mins  56445;  Therapeutic Exercise:  18    mins  67843;     Neuromuscular Ivan:        mins  25289;    Therapeutic Activity:     20      mins  30386;     Ultrasound:                          mins  18358;  Iontophoresis:                     mins  29879;    Electrical Stimulation:         mins  79068 ( );  Mechanical Traction:          mins  14722  Dry Needling                       mins self-pay     Timed Treatment:   38    mins                  Total Treatment:     38    mins      Evelio Dumont, PT  Physical Therapist  Lic # [id number]    Access Code: [email or web address]  URL: [email or web address]  Date: 04/20/2023  Prepared by: Manjeet Dumont    Exercises  - SUPINE BRIDGE w/ GLUTEAL Set on heels  - 1 x daily - 7 x weekly - 1-2 sets - 15 reps - 3-5sec hold  - Step Up  - 1 x daily - 7 x weekly - 1-2 sets - 15-20 reps - 3-5 hold  - STANDIN HEEL - TOE RAISES   - 1 x daily - 7 x weekly - 1-2 sets - 10-15 reps - 3 sec hold  - Standing Single Leg Stance with Counter Support  - 1-2 x daily - 7 x weekly - 1-2 minutes hold  - Side Stepping with Resistance at Thighs  - 1 x daily - 7 x weekly - 1 sets - 3 reps - 20-30 feet - right / left  - Clamshell  - 1 x daily - 7 x weekly - 1-2 sets - 10-15 reps - 3-5 hold  - Sidelying Hip Abduction  - 1 x daily - 7 x weekly - 1-2 sets - 10-15 reps - 3 hold  - Prone Hip Extension - Two Pillows  - 1 x daily - 7 x weekly - 1-2 sets - 10-15 reps - 5 hold

## 2023-04-24 ENCOUNTER — TREATMENT (OUTPATIENT)
Dept: PHYSICAL THERAPY | Facility: CLINIC | Age: 63
End: 2023-04-24
Payer: MEDICARE

## 2023-04-24 DIAGNOSIS — Z87.81 HISTORY OF FRACTURE OF RIGHT ANKLE: ICD-10-CM

## 2023-04-24 DIAGNOSIS — M17.12 ARTHRITIS OF LEFT KNEE: Primary | ICD-10-CM

## 2023-04-24 PROCEDURE — 97530 THERAPEUTIC ACTIVITIES: CPT | Performed by: PHYSICAL THERAPIST

## 2023-04-24 PROCEDURE — 97110 THERAPEUTIC EXERCISES: CPT | Performed by: PHYSICAL THERAPIST

## 2023-04-24 NOTE — LETTER
Physical Therapy Daily Note  Re-Assessment    Patient Name: Alisha Connolly         :  1960  Referring Physician: Tomás Rasmussen MD  Treatment Date: 2023  Date of Initial Visit:  3/15/2023 - 10 Visits    Visit Diagnoses:    ICD-10-CM ICD-9-CM   1. Arthritis of left knee  M17.12 716.96   2. History of fracture of right ankle  Z87.81 V15.51   _____________________________________________________    Subjective   Alisha Connolly reports: Saturday was good on her feet a lot - about 6pm started knee started bothering her - got home, rested -  ran errands - cooking in kitchen and knee got so bad she could hardly walk - severe pain this morning and swollen - Pain up lateral hip to side of hip with swelling / pain medial knee, posterior knee, lateral knee -   Pain at night and first thing in AM up to 10/10 until she ices her knee about 1/5 hours before getting out of bed - and pain severe w/ any prolonged activities standing, walking, stairs, etc. This has been going on for the last couple of weeks with no known cause - Had been doing very well in PT -    Objective   Antalgice - Severely tender medial and lateral joint line, pes region, posterior (L) knee - tender at proximal tib-fib joint (L)   ROM WFL w/ pain at end range (L) knee -   Diffusely swollen (L) knee into lower leg -     Assessment/Plan  62 yo female: (R) Ankle fx 2022;  (L) knee pain (mild medial OA, PF OA per Xrays) possible medial meniscus involvement due to persistent medial knee pain -   Sudden onset of progressively worsening knee / lower leg pain / swelling over last couple of weeks w/ no known cause after initially responding well to PT - Apparently (-) Doppler -  LLE  Alisha would benefit from further assessment by referring Physician due to pain pain worsening at night and in AM including hip now as well -    RECOMMEND from further assessment  due to pain pain worsening at night and in AM including hip now as well -     Thank you for  this referral -     Evelio Dumont, PT  Physical Therapist  Lic # [id number]

## 2023-04-24 NOTE — PROGRESS NOTES
Physical Therapy Daily Note  Re-Assessment    Patient Name: Alisha Connolly         :  1960  Referring Physician: Tomás Rasmussen MD  Treatment Date: 2023  Date of Initial Visit: Type: THERAPY  Noted: 3/15/2023    Visit Diagnoses:    ICD-10-CM ICD-9-CM   1. Arthritis of left knee  M17.12 716.96   2. History of fracture of right ankle  Z87.81 V15.51       _____________________________________________________    Subjective   Alisha Connolly reports: Saturday was good on her feet a lot - about 6pm started knee started bothering her - got home, rested -  ran errands - cooking in kitchen and knee got so bad she could hardly walk - severe pain this morning and swollen - Pain up lateral hip to side of hip with swelling / pain medial knee, posterior knee, lateral knee -   Pain at night and first thing in AM up to 10/10 until she ices her knee about 1/5 hours before getting out of bed - and pain severe w/ any prolonged activities standing, walking, stairs, etc.     Objective   Antalgice - Severely tender medial and lateral joint line, pes region, posterior (L) knee - tender at proximal tib-fib joint (L)   ROM WFL w/ pain at end range (L) knee -   Diffusely swollen (L) knee into lower leg -     See Exercise, Manual, and Modality Logs for complete treatment.     Functional / Therapeutic Activities:    · TAPING / BRACING: NA  · SEE EXERCISE FLOW SHEET -   · Knee assessment   · Jt protection, ADL modification; Posture and      Assessment/Plan  64 yo female: (R) Ankle fx 2022;  (L) knee pain (mild medial OA, PF OA per Xrays) possible medial meniscus involvement due to persistent medial knee pain -   Sudden onset of progressively worsening knee / lower leg pain / swelling over last couple of weeks w/ no known cause after initially responding well to PT - Apparently (-) Doppler -  LLE  Alisha would benefit from further assessment by referring Physician due to pain pain worsening at night and in AM  including hip now as well -     GOAL STATUS:   STG: All met , but goal 2) Pain decreased 50% partially met - (R) ankle pain decreased, but knee pain increasing recently.    LT) (I) HEP;- MET   2) AROM WFL and pain free PARTIALLY MET  ; 3) Strength / mobility to be able to perform all ADL's and job-related activities w/o restrictions- NOT MET  ;4) Pt able to ambulate functional distances w/ taping and/or AD safely - NOT MET       Progress per Plan of Care gentle progression of strengthening, etc as pain/swelling allows -modify as needed - add Hip / ITB stretching next session -  as vu  RECOMMEND from further assessment  due to pain pain worsening at night and in AM including hip now as well -     Alisha is to see Dr. Rasmussen 2023 in the AM - and PT later in day -    Sent note to Darrell Rasmussen -   _________________________________________________     Manual Therapy:                mins  83827;  Therapeutic Exercise:  23    mins  98937;     Neuromuscular Ivan:        mins  83713;    Therapeutic Activity:     23      mins  89590;     Ultrasound:                          mins  48616;  Iontophoresis:                     mins  12812;    Electrical Stimulation:         mins  82563 ( );  Mechanical Traction:          mins  90847  Dry Needling                       mins self-pay     Timed Treatment:   46    mins                  Total Treatment:     46    mins        Evelio Dumont, PT  Physical Therapist  Lic # [id number]    Access Code: [email or web address]  URL: [email or web address]  Date: 2023  Prepared by: Manjeet Dumont    Exercises  - Step Up  - 1 x daily - 7 x weekly - 1-2 sets - 15-20 reps - 3-5 hold  - STANDIN HEEL - TOE RAISES   - 1 x daily - 7 x weekly - 1-2 sets - 10-15 reps - 3 sec hold  - Standing Single Leg Stance with Counter Support  - 1-2 x daily - 7 x weekly - 1-2 minutes hold  - Side Stepping with Resistance at Thighs  - 1 x daily - 7 x weekly - 1 sets - 3 reps - 20-30 feet - right / left  - [facility]  - 1  x daily - 7 x weekly - 1-2 sets - 10-15 reps - 3-5 hold  - Sidelying Hip Abduction  - 1 x daily - 7 x weekly - 1-2 sets - 10-15 reps - 3 hold  - Prone Hip Extension - Two Pillows  - 1 x daily - 7 x weekly - 1-2 sets - 10-15 reps - 5 hold  - Bridge with Arms at Sides and Feet on Swiss Ball  - 1 x daily - 7 x weekly - 1-2 sets - 15-20 reps - 3-5s hold

## 2023-04-26 ENCOUNTER — TREATMENT (OUTPATIENT)
Dept: PHYSICAL THERAPY | Facility: CLINIC | Age: 63
End: 2023-04-26
Payer: MEDICARE

## 2023-04-26 ENCOUNTER — OFFICE VISIT (OUTPATIENT)
Dept: ORTHOPEDIC SURGERY | Facility: CLINIC | Age: 63
End: 2023-04-26
Payer: MEDICARE

## 2023-04-26 VITALS — WEIGHT: 208 LBS | BODY MASS INDEX: 36.86 KG/M2 | HEIGHT: 63 IN | TEMPERATURE: 96.9 F

## 2023-04-26 DIAGNOSIS — M17.12 ARTHRITIS OF LEFT KNEE: Primary | ICD-10-CM

## 2023-04-26 DIAGNOSIS — E11.9 TYPE 2 DIABETES MELLITUS WITHOUT COMPLICATION, UNSPECIFIED WHETHER LONG TERM INSULIN USE: ICD-10-CM

## 2023-04-26 DIAGNOSIS — Z87.81 HISTORY OF FRACTURE OF RIGHT ANKLE: ICD-10-CM

## 2023-04-26 PROCEDURE — 97530 THERAPEUTIC ACTIVITIES: CPT | Performed by: PHYSICAL THERAPIST

## 2023-04-26 PROCEDURE — 97110 THERAPEUTIC EXERCISES: CPT | Performed by: PHYSICAL THERAPIST

## 2023-04-26 RX ORDER — MELOXICAM 7.5 MG/1
15 TABLET ORAL ONCE
OUTPATIENT
Start: 2023-04-26 | End: 2023-04-26

## 2023-04-26 RX ORDER — PREGABALIN 75 MG/1
75 CAPSULE ORAL ONCE
OUTPATIENT
Start: 2023-04-26 | End: 2023-04-26

## 2023-04-26 RX ORDER — CHLORHEXIDINE GLUCONATE 500 MG/1
1 CLOTH TOPICAL TAKE AS DIRECTED
OUTPATIENT
Start: 2023-04-26

## 2023-04-26 RX ORDER — ACETAMINOPHEN 325 MG/1
1000 TABLET ORAL ONCE
OUTPATIENT
Start: 2023-04-26 | End: 2023-04-26

## 2023-04-26 NOTE — PROGRESS NOTES
Physical Therapy Daily Note    Patient Name: Alisha Connolly         :  1960  Referring Physician: Tomás Rasmussen MD  Treatment Date: 2023  Date of Initial Visit: Type: THERAPY  Noted: 3/15/2023    Visit Diagnoses:    ICD-10-CM ICD-9-CM   1. Arthritis of left knee  M17.12 716.96   2. History of fracture of right ankle  Z87.81 V15.51       _____________________________________________________    Subjective   Alisha Connolly reports: saw MD this morning - to have a TKA (L) scheduled -   Pt reports apprehension about this recommendation - Very concerned after talking to friends/ neighbors, etc. What are other options -   Pt brought pictures of Xrays - pointed out bone chip in posterior knee and Baker's cyst as well -     Objective   (L) knee diffusely swollen - anterior, lateral, posterior,medial -      See Exercise, Manual, and Modality Logs for complete treatment.     Functional / Therapeutic Activities:    · TAPING / BRACING: NA  · Reviewed Xrays and discussed w/ Pt (reminding her I am not a Radiologist and to discuss findings w/ her referring MD )  · Explained a TKA - what is done, purpose, showed pictures, materials used, recovery, rehab, etc at length  · Discussed scar care, purpose of TKA, swelling, long term issues, potential time lines, etc.   · Jt protection, ADL modification; Posture and      Assessment/Plan  64 yo female: (R) Ankle fx 2022;  (L) knee pain (mild medial OA, PF OA per Xrays) possible medial meniscus involvement due to persistent medial knee pain -   Sudden onset of progressively worsening knee / lower leg pain / swelling over last couple of weeks w/ no known cause after initially responding well to PT   MD rec TKA - Pt apprehensive - may seek 2nd opinion -     Progress strengthening /stabilization /functional activity as pain allows in prep for TKA in near future -        _________________________________________________    Manual Therapy:                 mins   37145;  Therapeutic Exercise:    23    mins  95541;     Neuromuscular Ivan:        mins  48218;    Therapeutic Activity:     20      mins  82927;     Ultrasound:                          mins  62935;  Iontophoresis:                     mins  35558;    Electrical Stimulation:         mins  82272 ( );  Mechanical Traction:          mins  46012  Dry Needling                       mins self-pay     Timed Treatment:   43    mins                  Total Treatment:     43    mins        Evelio Dumont PT  Physical Therapist  Lic # [id number]

## 2023-04-26 NOTE — PROGRESS NOTES
Patient Name: Alisha Connolly   YOB: 1960  Referring Primary Care Physician: Aarti Ogden APRN  BMI: Body mass index is 36.85 kg/m².    Chief Complaint:    Chief Complaint   Patient presents with   • Left Knee - Follow-up, Pain        HPI:     Alisha Connolly is a 63 y.o. female who presents today for evaluation of   Chief Complaint   Patient presents with   • Left Knee - Follow-up, Pain   .  Alisha follows up today and says that her left knee is not getting better and is hurting her a lot.  She has been using ice meloxicam and doing home physical therapy as well as going to therapy with Evelio powdery.  Its achy it bothers her at night.  She does have a history of diabetes but her most recent A1c she said was 6.2 wants to talk about replacements.  She is [place] Mar[place] neighbor      Subjective   Medications:   Home Medications:  Current Outpatient Medications on File Prior to Visit   Medication Sig   • acetaminophen (TYLENOL) 500 MG tablet Take 1 tablet by mouth Every 6 (Six) Hours As Needed for Mild Pain.   • albuterol sulfate  (90 Base) MCG/ACT inhaler Inhale 2 puffs Every 4 (Four) Hours As Needed.   • aspirin 81 MG EC tablet Take 1 tablet by mouth Daily. HOLDING FOR SURGERY   • cholecalciferol (VITAMIN D3) 25 MCG (1000 UT) tablet Take 1 tablet by mouth Daily. HOLDING FOR SURGERY   • DHA-EPA-Vitamin E 192-251-11 MG-MG-UNIT capsule Take 1 tablet by mouth. HOLDING FOR SURGERY   • Estradiol Acetate (Femring) 0.05 MG/24HR ring Insert 1 each into the vagina. CHANGES EVERY 3 MONTHS   • Hydrocortisone, Perianal, (Anusol-HC) 2.5 % rectal cream Apply to hemorrhoids 3 times daily for 7 days during hemorrhoid flare. Include applicator.   • Lancets (OneTouch Delica Plus [id number]) misc 1 each by Other route Daily.   • lisinopril (PRINIVIL,ZESTRIL) 40 MG tablet Take 1 tablet by mouth Daily.   • meloxicam (Mobic) 15 MG tablet Take 1 tablet by mouth Daily.   • metFORMIN (GLUCOPHAGE) 500 MG tablet Take 1 tablet by  mouth 2 (Two) Times a Day With Meals.   • methylPREDNISolone (MEDROL) 4 MG dose pack Take as directed on package instructions.   • OneTouch Ultra test strip 1 each by Other route Daily.     No current facility-administered medications on file prior to visit.     Current Medications:  Scheduled Meds:  Continuous Infusions:No current facility-administered medications for this visit.    PRN Meds:.    I have reviewed the patient's medical history in detail and updated the computerized patient record.  Review and summarization of old records includes:    Past Medical History:   Diagnosis Date   • Abnormal Pap smear of cervix    • Age-related nuclear cataract of both eyes 02/03/2020   • Anal fissure 10/2021   • Anal skin tag 08/2022    S/P EXCISION   • Anal skin tag    • Ankle fracture 12/2022    RIGHT   • Arthrodesis status 03/08/2014   • Asthma    • Benign essential hypertension 06/05/2015    Continue lisinopril   • Carpal tunnel syndrome    • Cervical spondylolysis    • Chronic neck pain 07/19/2016   • Closed nondisplaced fracture of lateral malleolus of right fibula 12/10/2022    NO SURGERY, SEEN AT [facility]   • Cystitis 11/2017   • Dry eye syndrome 02/03/2020   • Environmental allergies    • Exogenous hypertriglyceridemia 06/05/2017   • Ganglion cyst of finger 01/2020   • Hemorrhoid    • Hemorrhoids    • History of COVID-19 05/2022   • Hormone replacement therapy (HRT)    • Hyperchylomicronemia    • Hyperlipidemia 06/05/2015    ASCVD 10-year risk ~10% Plan: · Start statin. Unclear whether arm numbness really 2/2 statin in the past.   • Hyperopia of both eyes 02/03/2020   • Keratoconjunctivitis sicca 02/03/2020   • Knee pain, left    • Lichen sclerosus    • Lung nodule 06/01/2006   • Obstructive sleep apnea syndrome 11/01/2004    Not using CPAP due to eye issues   • Palpitations    • Pertussis 02/08/2016    [facility]   • PNA (pneumonia)    • PONV (postoperative nausea and vomiting)    • Presbyopia 02/03/2020   •  Rotator cuff injury 2011    SEEN AT [facility] ER   • Seasonal allergies    • Slow to wake up after anesthesia    • Snoring    • Type 2 diabetes mellitus without complication 2015    Under good control Plan: · Reassured about use of metformin   • Urinary urgency 2021        Past Surgical History:   Procedure Laterality Date   • ABDOMINAL HYSTERECTOMY     • ANTERIOR CERVICAL DISCECTOMY W/ FUSION N/A 2014    C6-7, DR. ALEXEY IZAGUIRRE AT [facility]. TITANIUM PLATE   • ANUS SURGERY N/A 2022    Procedure: ANAL TAG EXCISION;  Surgeon: Bradley Santiago MD;  Location:  [facility];  Service: General;  Laterality: N/A;  pre-anal tag  post-same   • CERVICAL SPINE SURGERY      TITANIUM PLATE   •  SECTION     • COLONOSCOPY N/A    • COLONOSCOPY N/A 2022    EXTERNAL AND INTERNAL HEMORRHOIDS, GRADE 3-BANDED, RESCOPE IN 10 YRS, DR. BRADLEY SANTIAGO AT [facility]   • HEMORRHOIDECTOMY N/A 2022    Procedure: HEMORRHOID BANDING x3;  Surgeon: Bradley Santiago MD;  Location:  [facility];  Service: General;  Laterality: N/A;  pre-hemorrhoids  post-same   • HYSTERECTOMY N/A     WITH BSO, FOR HEAVY BLEEDING   • INCISIONAL BREAST BIOPSY Right     BENIGN   • LAPAROSCOPIC LYSIS OF ADHESIONS N/A    • OOPHORECTOMY     • OOPHORECTOMY     • SPHINCTEROTOMY N/A 2023    Procedure: EXAM UNDER ANESTHESIA, CHEMICAL SPHINCTEROTOMY WITH BOTOX;  Surgeon: Bradley Santiago MD;  Location[facility] MAIN OR;  Service: General;  Laterality: N/A;        Social History     Occupational History   • Not on file   Tobacco Use   • Smoking status: Former     Packs/day: 0.25     Years: 4.50     Pack years: 1.13     Types: Cigarettes     Quit date: 3/7/2014     Years since quittin.1     Passive exposure: Past   • Smokeless tobacco: Never   Vaping Use   • Vaping Use: Never used   Substance and Sexual Activity   • Alcohol use: Yes     Comment: occ   • Drug use: Never   • Sexual activity: Yes     Partners: Male     Birth  "control/protection: Post-menopausal, Hysterectomy      Social History     Social History Narrative   • Not on file        Family History   Problem Relation Age of Onset   • Arthritis Mother    • Heart disease Mother    • Arrhythmia Mother    • Hyperlipidemia Father    • Diabetes Father    • Heart disease Father    • Coronary artery disease Father    • Diabetes Sister    • Diabetes Paternal Grandmother    • Cancer Cousin    • Breast cancer Cousin    • Cancer Maternal Great-Grandmother    • Ovarian cancer Maternal Great-Grandmother    • Malig Hyperthermia Neg Hx        ROS: 14 point review of systems was performed and all other systems were reviewed and are negative except for documented findings in HPI and today's encounter.     Allergies:   Allergies   Allergen Reactions   • Penicillins Hives   • Atorvastatin Paresthesia     Arm numbness           • Rosuvastatin Paresthesia     Arm numbness     • Latex Rash   • Tetanus Toxoid, Adsorbed Rash     Constitutional:  Denies fever, shaking or chills   Eyes:  Denies change in visual acuity   HENT:  Denies nasal congestion or sore throat   Respiratory:  Denies cough or shortness of breath   Cardiovascular:  Denies chest pain or severe LE edema   GI:  Denies abdominal pain, nausea, vomiting, bloody stools or diarrhea   Musculoskeletal:  Numbness, tingling, pain, or loss of motor function only as noted above in history of present illness.  : Denies painful urination or hematuria  Integument:  Denies rash, lesion or ulceration   Neurologic:  Denies headache or focal weakness  Endocrine:  Denies lymphadenopathy  Psych:  Denies confusion or change in mental status   Hem:  Denies active bleeding    OBJECTIVE:  Physical Exam: 63 y.o. female  Wt Readings from Last 3 Encounters:   04/26/23 94.3 kg (208 lb)   03/28/23 97.8 kg (215 lb 9.6 oz)   03/08/23 93.9 kg (207 lb)     Ht Readings from Last 1 Encounters:   04/26/23 160 cm (63\")     Body mass index is 36.85 kg/m².  Vitals:    " 04/26/23 1010   Temp: 96.9 °F (36.1 °C)     Vital signs reviewed.     General Appearance:    Alert, cooperative, in no acute distress                  Eyes: conjunctiva clear  ENT: external ears and nose atraumatic  CV: no peripheral edema  Resp: normal respiratory effort  Skin: no rashes or wounds; normal turgor  Psych: mood and affect appropriate  Lymph: no nodes appreciated  Neuro: gross sensation intact  Vascular:  Palpable peripheral pulse in noted extremity  Musculoskeletal Extremities: Michael today shows pleasant lady she has crepitation synovitis swelling and joint line tenderness along her left knee she has some stiffness as well and a Baker's cyst her calf is soft Javier's is negative she has no locking or catching and she is swollen and inflamed    Radiology:   AP lateral 40 degree PA x-ray left knee taken in the past in the office without comparison views available show advanced arthritic varus arthritis with narrowing and subchondral sclerosis in the medial compartment and best seen on the lateral with patellofemoral changes as well.  This is somewhat contrast with her MRI that she had had previously however the plain films show severe narrowing        Assessment:     ICD-10-CM ICD-9-CM   1. Arthritis of left knee  M17.12 716.96   2. Type 2 diabetes mellitus without complication, unspecified whether long term insulin use  E11.9 250.00        MDM/Plan:   The diagnosis(es), natural history, pathophysiology and treatment for diagnosis(es) were discussed. Opportunity given and questions answered.  Biomechanics of pertinent body areas discussed.  When appropriate, the use of ambulatory aids discussed.    Biomechanics of pertinent body areas discussed.  When appropriate, the use of ambulatory aids discussed.  BMI:  The concept of BMI body mass index and its importance and implications discussed.    Inflammation/pain control; with cold, heat, elevation and/or liniments discussed as appropriate  MEDICAL RECORDS  reviewed from other provider(s) for past and current medical history pertinent to this complaint.  Total Knee Replacement : Continuation of conservative management vs. TKA: I reviewed anatomy of a total knee arthroplasty in laymen's terms, as well as typical postoperative recovery and possibly 6-12 months for maximal recovery, and possible need for rehabilitation stay after hospitalization. We also discussed risks, benefits, alternatives, and limitations of procedure with risks including but not limited to neurovascular damage, bleeding, infection, malalignment, chronic pain, failure of implants, osteolysis, loosening of implants, loss of motion, weakness, stiffness, instability, DVT, pulmonary embolus, death, stroke, complex regional pain syndrome, myocardial infarction, and need for additional procedures. Concept of substitution vs. replacement discussed.  No guarantees were given regarding results of surgery.  Patient verbalized understanding, and was given the opportunity to ask and have all questions answered today.       4/26/2023    Dictated utilizing Dragon dictation

## 2023-05-01 ENCOUNTER — OFFICE VISIT (OUTPATIENT)
Dept: FAMILY MEDICINE CLINIC | Facility: CLINIC | Age: 63
End: 2023-05-01
Payer: MEDICARE

## 2023-05-01 VITALS
TEMPERATURE: 97.1 F | DIASTOLIC BLOOD PRESSURE: 75 MMHG | HEART RATE: 74 BPM | SYSTOLIC BLOOD PRESSURE: 140 MMHG | OXYGEN SATURATION: 97 % | BODY MASS INDEX: 37.2 KG/M2 | WEIGHT: 210 LBS

## 2023-05-01 DIAGNOSIS — M25.562 CHRONIC PAIN OF LEFT KNEE: ICD-10-CM

## 2023-05-01 DIAGNOSIS — I10 BENIGN ESSENTIAL HYPERTENSION: Chronic | ICD-10-CM

## 2023-05-01 DIAGNOSIS — G89.29 CHRONIC PAIN OF LEFT KNEE: ICD-10-CM

## 2023-05-01 DIAGNOSIS — E11.9 TYPE 2 DIABETES MELLITUS WITHOUT COMPLICATION, WITHOUT LONG-TERM CURRENT USE OF INSULIN: Primary | Chronic | ICD-10-CM

## 2023-05-01 DIAGNOSIS — E78.3 EXOGENOUS HYPERTRIGLYCERIDEMIA: Chronic | ICD-10-CM

## 2023-05-01 RX ORDER — LISINOPRIL 40 MG/1
40 TABLET ORAL DAILY
Qty: 90 TABLET | Refills: 1 | Status: SHIPPED | OUTPATIENT
Start: 2023-05-01

## 2023-05-01 NOTE — PROGRESS NOTES
"Chief Complaint  Follow-up (F/u L knee ) and Diabetes (F/u dm )    Subjective        Alisha Connolly presents to [facility] PRIMARY [facility]ARE  History of Present Illness    Ms. Connolly is a 63-year-old female who presents today for a 3-month follow-up on diabetes and left knee pain.    She is taking her metformin, lisinopril, and baby aspirin daily. Dr. Rasmussen has her on an anti-inflammatory for left knee pain. She was unable to put any weight on her legs at her last visit. She had an MRI done, which showed a torn meniscus and arthritis in her left knee. Dr. Rasmussen has not recommended repair-due to concern about starting up more arthritis in her knee. He gave her an injection which has not provided much relief.  She is still icing it every day and has quite a bit of swelling in her leg despite wearing compression stocking.  She had a Doppler of her left leg that was negative for DVT.  She has been doing for PT now for 5 to 6 weeks with some improvement but still not resolution of her pain.  Dr. Rasmussen recommended knee replacement and she wonders if she should get a second opinion.  The pain has affected gait, ambulation, ADLs.    Her diabetes has been a lot better when she checks it. She admits she should be checking it more often. It has been between 100 and 120 mm/dL. Last year, it was 150, 151, and sometimes 200 mm/dL. She has been trying to make sure she takes her medications morning and night. She started taking cinnamon with it.  Taking tolerating metformin twice daily without side effects.    She takes lisinopril for her blood pressure.  Denies chest pain, palpitations, dizziness, headache.        Objective   Vital Signs:  /75   Pulse 74   Temp 97.1 °F (36.2 °C)   Wt 95.3 kg (210 lb)   SpO2 97%   BMI 37.20 kg/m²   Estimated body mass index is 37.2 kg/m² as calculated from the following:    Height as of 4/26/23: 160 cm (63\").    Weight as of this encounter: 95.3 kg (210 lb).       Class 2 Severe " Obesity (BMI >=35 and <=39.9). Obesity-related health conditions include the following: hypertension, diabetes mellitus, dyslipidemias and osteoarthritis. Obesity is improving with lifestyle modifications. BMI is is above average; BMI management plan is completed. We discussed portion control and increasing exercise.      Physical Exam  Vitals and nursing note reviewed.   Constitutional:       General: She is not in acute distress.     Appearance: She is well-developed. She is not ill-appearing or diaphoretic.   HENT:      Head: Normocephalic and atraumatic.   Eyes:      General: No scleral icterus.        Right eye: No discharge.         Left eye: No discharge.      Conjunctiva/sclera: Conjunctivae normal.   Cardiovascular:      Rate and Rhythm: Normal rate and regular rhythm.      Heart sounds: Normal heart sounds.   Pulmonary:      Effort: Pulmonary effort is normal.      Breath sounds: Normal breath sounds.   Abdominal:      General: Bowel sounds are normal.      Palpations: Abdomen is soft.      Tenderness: There is no abdominal tenderness.   Musculoskeletal:         General: No deformity.      Comments: Left knee enlarged-visible through pants  Antalgic gait-does appear steady though   Skin:     General: Skin is warm and dry.   Neurological:      Mental Status: She is alert and oriented to person, place, and time.   Psychiatric:         Mood and Affect: Mood normal.         Behavior: Behavior normal.        Result Review :                   Assessment and Plan   Diagnoses and all orders for this visit:    1. Type 2 diabetes mellitus without complication, without long-term current use of insulin (Primary)  -     metFORMIN (GLUCOPHAGE) 500 MG tablet; Take 1 tablet by mouth 2 (Two) Times a Day With Meals.  Dispense: 180 tablet; Refill: 1  -     Hemoglobin A1c    2. Benign essential hypertension  -     lisinopril (PRINIVIL,ZESTRIL) 40 MG tablet; Take 1 tablet by mouth Daily.  Dispense: 90 tablet; Refill: 1    3.  Exogenous hypertriglyceridemia  -     Lipid Panel With LDL / HDL Ratio    4. Chronic pain of left knee    Type 2 diabetes: We will continue current metformin and check an A1c.  Adjust based on lab values.  Sounds like her A1c should be continuing to trend downward.    Hypertension: Blood pressure is a little bit high today but she has been in quite a bit of pain and is on an NSAID for management of chronic pain.  We will continue current medication and have her let me know if it is starting to trend upward.    Hyperlipidemia: Not on statin.  Refuses due to numbness in arm when previously on a statin.  We will check lipid today.  Dietary modifications encouraged    Recommend contacting Ortho about a second opinion for left knee pain.  She is well within her rights to do so and I would imagine Ortho would be accommodating of this.         Follow Up   No follow-ups on file.  Patient was given instructions and counseling regarding her condition or for health maintenance advice. Please see specific information pulled into the AVS if appropriate.     Transcribed from ambient dictation for KANWAL Barker by Janett Martinez.  05/01/23   12:43 EDT    Patient or patient representative verbalized consent to the visit recording.  I have personally performed the services described in this document as transcribed by the above individual, and it is both accurate and complete.

## 2023-05-02 LAB
CHOLEST SERPL-MCNC: 242 MG/DL (ref 0–200)
HBA1C MFR BLD: 5.9 % (ref 4.8–5.6)
HDLC SERPL-MCNC: 60 MG/DL (ref 40–60)
LDLC SERPL CALC-MCNC: 145 MG/DL (ref 0–100)
LDLC/HDLC SERPL: 2.35 {RATIO}
TRIGL SERPL-MCNC: 206 MG/DL (ref 0–150)
VLDLC SERPL CALC-MCNC: 37 MG/DL (ref 5–40)

## 2023-05-03 ENCOUNTER — TREATMENT (OUTPATIENT)
Dept: PHYSICAL THERAPY | Facility: CLINIC | Age: 63
End: 2023-05-03
Payer: MEDICARE

## 2023-05-03 DIAGNOSIS — Z87.81 HISTORY OF FRACTURE OF RIGHT ANKLE: ICD-10-CM

## 2023-05-03 DIAGNOSIS — M17.12 ARTHRITIS OF LEFT KNEE: Primary | ICD-10-CM

## 2023-05-03 PROCEDURE — 97530 THERAPEUTIC ACTIVITIES: CPT | Performed by: PHYSICAL THERAPIST

## 2023-05-03 PROCEDURE — 97110 THERAPEUTIC EXERCISES: CPT | Performed by: PHYSICAL THERAPIST

## 2023-05-03 NOTE — PROGRESS NOTES
Physical Therapy Daily Note    Patient Name: Alisha Connolly         :  1960  Referring Physician: Tomás Rasmussen MD  Treatment Date: 5/3/2023  Date of Initial Visit: No linked episodes    Visit Diagnoses:    ICD-10-CM ICD-9-CM   1. Arthritis of left knee  M17.12 716.96   2. History of fracture of right ankle  Z87.81 V15.51       _____________________________________________________    Subjective   Alisha Connolly reports: persistent sharp pain, med,lat knee, squatting, first thing in AM -   Spoke to many people who have had a TKA and feels like it is the way to go now -     Objective   Mild/mod swelling anterior, ant/med/lat, posterior knee (L) -     See Exercise, Manual, and Modality Logs for complete treatment.     Functional / Therapeutic Activities:    · TAPING / BRACING: NA  · SEE EXERCISE FLOW SHEET -   · Discussion at length again on TKA, how it can help, reasons, procedure, recovery, PT, scar and scar care, pain issues, etc.    Assessment/Plan  64 yo female: (R) Ankle fx 2022;  (L) knee pain (mild medial OA, PF OA per Xrays) possible medial meniscus involvement due to persistent medial knee pain -   Sudden onset of progressively worsening knee / lower leg pain / swelling over last couple of weeks w/ no known cause after initially responding well to PT   Tolerating modified PT program well -   -      Progress strengthening /stabilization /functional activity as pain allows in prep for TKA in near future -      _________________________________________________     Manual Therapy:                 mins  52016;  Therapeutic Exercise:    25    mins  19516;     Neuromuscular Ivan:        mins  99333;    Therapeutic Activity:     20      mins  13223;     Ultrasound:                          mins  25829;  Iontophoresis:                     mins  49761;    Electrical Stimulation:         mins  41868 ( );  Mechanical Traction:          mins  65450  Dry Needling                       mins  self-pay     Timed Treatment:   45    mins                  Total Treatment:     45    mins         Evelio Dumont, PT  Physical Therapist  Lic # [id number]

## 2023-05-04 ENCOUNTER — TELEPHONE (OUTPATIENT)
Dept: FAMILY MEDICINE CLINIC | Facility: CLINIC | Age: 63
End: 2023-05-04

## 2023-05-04 DIAGNOSIS — E11.9 TYPE 2 DIABETES MELLITUS WITHOUT COMPLICATION, WITHOUT LONG-TERM CURRENT USE OF INSULIN: Primary | ICD-10-CM

## 2023-05-04 NOTE — TELEPHONE ENCOUNTER
Caller: Alisha Connolly    Relationship: Self    Best call back number: [phone]    Do you know the name of the person who called: JOHANNA    What was the call regarding: PATIENT WAS RETURNING A MISSED CALL    Do you require a callback: YES, ATTEMPTED WARM TRANSFER NO ANSWER.

## 2023-05-08 ENCOUNTER — TELEPHONE (OUTPATIENT)
Dept: ORTHOPEDIC SURGERY | Facility: CLINIC | Age: 63
End: 2023-05-08

## 2023-05-08 ENCOUNTER — TELEPHONE (OUTPATIENT)
Dept: PHYSICAL THERAPY | Facility: CLINIC | Age: 63
End: 2023-05-08

## 2023-05-08 NOTE — TELEPHONE ENCOUNTER
Caller: Alisha Connolly    Relationship to patient: Self    Best call back number: [phone]    Patient is needing SCHEDULE LEFT  KNEE SX

## 2023-05-10 ENCOUNTER — TREATMENT (OUTPATIENT)
Dept: PHYSICAL THERAPY | Facility: CLINIC | Age: 63
End: 2023-05-10
Payer: MEDICARE

## 2023-05-10 DIAGNOSIS — Z87.81 HISTORY OF FRACTURE OF RIGHT ANKLE: ICD-10-CM

## 2023-05-10 DIAGNOSIS — M17.12 ARTHRITIS OF LEFT KNEE: Primary | ICD-10-CM

## 2023-05-10 PROCEDURE — 97035 APP MDLTY 1+ULTRASOUND EA 15: CPT | Performed by: PHYSICAL THERAPIST

## 2023-05-10 PROCEDURE — 97140 MANUAL THERAPY 1/> REGIONS: CPT | Performed by: PHYSICAL THERAPIST

## 2023-05-10 PROCEDURE — 97530 THERAPEUTIC ACTIVITIES: CPT | Performed by: PHYSICAL THERAPIST

## 2023-05-10 NOTE — PROGRESS NOTES
Physical Therapy Daily Note    Patient Name: Alisha Connolly         :  1960  Referring Physician: Tomás Rasmussen MD  Treatment Date: 5/10/2023  Date of Initial Visit: Type: THERAPY  Noted: 3/15/2023    Visit Diagnoses:    ICD-10-CM ICD-9-CM   1. Arthritis of left knee  M17.12 716.96   2. History of fracture of right ankle  Z87.81 V15.51       _____________________________________________________    Subjective   Alisha Connolly reports: getting up from a chair and felt and heard a tear/rip along medial knee jt (L) - felt and heard it -more painful / less stable and more swollen -  To have a TKA 2023 - Told she couldn't go on car trip longer than 1 hour for 3 months -by eunice the  -   Very discouraged by the lack of response to phone calls, phone not being answered - Not being called when told -     Objective   (L) knee very swollen into lower leg - Very tender along medial joint line - Limited knee extension 10-deg due to pain medial > lateral knee joint -   Antalgic gait w/ decreased WB-ing LLE -      EXERCISE:  Reviewed Supine isometric leg press, Supine HS curl, and hip abduction / glute exercises at home and avoid exercises that increase her pain -      Functional / Therapeutic Activities:    · TAPING / BRACING: K-tape to Unload / Stabilize medial knee w/ 2 medial strips w/ X strip; 1 lateral strip - to decrease pain and improve gait / functional ability -   · At length discussion about the info given to her by Eunice at Dr. marinelli office - how a lot of it did not sound accurate and encouraged her to speak to Dr. Rasmussen, his nurse, PA, etc to get clear info, get home health set up,   · Educated pt on pain control and swelling w/ ice, elevation , self massage -   · Jt protection / avoiding knee HE standing, sitting / supine to decrease pain -     Assessment/Plan  64 yo female: (R) Ankle fx 2022;  (L) knee pain - To have (L) TKA 2023  Flare / exacerbation of medial knee pain (progression of  MMT?)  Pain decreased and gait improved after Taping, etc today -    Progress strengthening /stabilization /functional activity / HEP that does not increase her pain -       _________________________________________________    Manual Therapy:            08     mins  79776;  Therapeutic Exercise:    05    mins  40442;     Neuromuscular Ivan:        mins  53449;    Therapeutic Activity:     20      mins  85586;     Ultrasound:                    08      mins  15328;  Iontophoresis:                     mins  46778;    Electrical Stimulation:         mins  08307 ( );  Mechanical Traction:          mins  53467  Dry Needling                       mins self-pay     Timed Treatment:   41    mins                  Total Treatment:     41    mins        Evelio Dumont PT  Physical Therapist  Lic # [id number]

## 2023-05-25 ENCOUNTER — TREATMENT (OUTPATIENT)
Dept: PHYSICAL THERAPY | Facility: CLINIC | Age: 63
End: 2023-05-25
Payer: MEDICARE

## 2023-05-25 DIAGNOSIS — Z87.81 HISTORY OF FRACTURE OF RIGHT ANKLE: ICD-10-CM

## 2023-05-25 DIAGNOSIS — M17.12 ARTHRITIS OF LEFT KNEE: Primary | ICD-10-CM

## 2023-05-25 PROCEDURE — 97530 THERAPEUTIC ACTIVITIES: CPT | Performed by: PHYSICAL THERAPIST

## 2023-05-25 PROCEDURE — 97110 THERAPEUTIC EXERCISES: CPT | Performed by: PHYSICAL THERAPIST

## 2023-05-25 NOTE — PROGRESS NOTES
Physical Therapy Daily Note  RE-ASSESSMENT     Patient Name: Alisha Connolly         :  1960  Referring Physician: Tomás Rasmussen MD  Treatment Date: 2023  Date of Initial Visit: Type: THERAPY  Noted: 3/15/2023    Visit Diagnoses:    ICD-10-CM ICD-9-CM   1. Arthritis of left knee  M17.12 716.96   2. History of fracture of right ankle  Z87.81 V15.51       _____________________________________________________    Subjective   Alisha Connolly reports: having been out of town for graduations, birthdays, etc. Knee doing better overall with decreased pain and swelling, but still painful if on her feet long or prolonged walking, unable to stairs reciprocally - Knee feels like it will give out on stairs if going reciprocally and feels like she will lose her balance when standing some times - To have her (L) Total knee surgery on 2023;     Objective   Pt presents w/o AD - Mildly antalgic gait - Diffusely decreased swelling - (L) Knee AROM 0-120-deg;   (+) Step up and Squat Test -     EXERCISES:   · SUPINE ISOMETRIC LEG PRESS w/ BALL 20/5 sec ea (B) / (L)  · SUPINE HS CURLS w/ LEGS on BALL x 20 / 3 sec hold w/ core emphasis  · BRIDGES w/ LEGS on BALL 20/5 sec ea  · SIT / STAND x 20 (bed at 22 in off floor)   · HELDER LEG PRESS; (B) / (L) x 25 ea 105#   · HELDER HIP ABD 35# x 25 ea  · HELDER HIP ADD 60# x 30 ea   · STEP UP FWD Hole 3 x 15 ea  · LAT STEP UP w/ ABD Hole 2-3 x 15 ea  · SIDE STEP 30 ft ea  · MONSTER WALK (retro) 30 ft x 2  · NUSTEP Seat 8; No Arms; L5 x 8 min     Functional / Therapeutic Activities:    • TAPING / BRACING: K-tape to Unload / Stabilize medial knee w/ 2 medial strips w/ X strip; 1 lateral strip - to decrease pain and improve gait / functional ability -   • FUNCTIONAL ASSESSMENT  • See exercises      Assessment/Plan  64 yo female: (R) Ankle fx 2022;  (L) knee pain - To have (L) TKA 2023  Flare / exacerbation of medial knee pain (progression of MMT?)  Pain decreased and gait  improved after Taping, etc today -     Progress strengthening /stabilization /functional activity / HEP that does not increase her pain -  Continue until TKA 6/20/2023 for pre-hab     _________________________________________________     Manual Therapy:                mins  01881;  Therapeutic Exercise:   20    mins  41742;     Neuromuscular Ivan:        mins  72641;    Therapeutic Activity:     23      mins  77935;     Ultrasound:                          mins  47637;  Iontophoresis:                     mins  15149;    Electrical Stimulation:         mins  02217 ( );  Mechanical Traction:          mins  32429  Dry Needling                       mins self-pay     Timed Treatment:   43    mins                  Total Treatment:     43    mins        Evelio Dumont, PT  Physical Therapist  Lic # [id number]

## 2023-05-31 ENCOUNTER — TREATMENT (OUTPATIENT)
Dept: PHYSICAL THERAPY | Facility: CLINIC | Age: 63
End: 2023-05-31

## 2023-05-31 DIAGNOSIS — M17.12 ARTHRITIS OF LEFT KNEE: Primary | ICD-10-CM

## 2023-05-31 PROCEDURE — 97530 THERAPEUTIC ACTIVITIES: CPT | Performed by: PHYSICAL THERAPIST

## 2023-05-31 PROCEDURE — 97110 THERAPEUTIC EXERCISES: CPT | Performed by: PHYSICAL THERAPIST

## 2023-05-31 NOTE — PROGRESS NOTES
Physical Therapy Daily Note    Patient Name: Alisha Connolly         :  1960  Referring Physician: Tomás Rasmussen MD  Treatment Date: 2023  Date of Initial Visit: Type: THERAPY  Noted: 3/15/2023    Visit Diagnoses:    ICD-10-CM ICD-9-CM   1. Arthritis of left knee  M17.12 716.96       _____________________________________________________    Subjective   Alisha Connolly reports: concerned about knee surgery     Objective   (L) knee grossly less swollen -     EXERCISES:   • SUPINE ISOMETRIC LEG PRESS w/ BALL 20/5 sec ea (B) / (L)  • SUPINE HS CURLS w/ LEGS on BALL x 20 / 3 sec hold w/ core emphasis  • BRIDGES w/ LEGS on BALL 20/5 sec ea  • SIT / STAND x 20 (bed at 22 in off floor)   • HELDER LEG PRESS; (B) / (L) x 25 ea 105#   • HELDER HIP ABD 35# x 25 ea  • HELDER HIP ADD 60# x 30 ea   • STEP UP FWD Hole 3 x 15 ea  • LAT STEP UP w/ ABD Hole 2-3 x 15 ea  • SIDE STEP 30 ft ea  • MONSTER WALK (retro) 30 ft x 2  • NUSTEP Seat 8; No Arms; L5 x 8 min      Functional / Therapeutic Activities:    • TAPING / BRACING: K-tape to Unload / Stabilize medial knee w/ 2 medial strips w/ X strip; 1 lateral strip - to decrease pain and improve gait / functional ability - HELD TODAY  • Long discussion about TKA, procedure, pain, Rehab, expectations, importance of keeping knee moving -   • See exercises          Assessment/Plan  64 yo female: (R) Ankle fx 2022;  (L) knee pain - To have (L) TKA 2023  Flare / exacerbation of medial knee pain (progression of MMT?)  Pain decreased and gait improved after Taping, etc today -     Progress strengthening /stabilization /functional activity / HEP that does not increase her pain -  Continue until TKA 2023 for pre-hab     _________________________________________________     Manual Therapy:                mins  04511;  Therapeutic Exercise:   20    mins  58649;     Neuromuscular Ivan:        mins  33210;    Therapeutic Activity:      23      mins  68749;      Ultrasound:                          mins  34410;  Iontophoresis:                     mins  11608;    Electrical Stimulation:         mins  08157 ( );  Mechanical Traction:          mins  65484  Dry Needling                       mins self-pay     Timed Treatment:   43    mins                  Total Treatment:     43    mins        Evelio Dumont, PT  Physical Therapist  Lic # [id number]

## 2023-06-01 ENCOUNTER — PRE-ADMISSION TESTING (OUTPATIENT)
Dept: PREADMISSION TESTING | Facility: HOSPITAL | Age: 63
End: 2023-06-01
Payer: MEDICARE

## 2023-06-01 VITALS
HEART RATE: 81 BPM | SYSTOLIC BLOOD PRESSURE: 155 MMHG | HEIGHT: 63 IN | RESPIRATION RATE: 16 BRPM | TEMPERATURE: 98.2 F | WEIGHT: 212 LBS | DIASTOLIC BLOOD PRESSURE: 84 MMHG | BODY MASS INDEX: 37.56 KG/M2 | OXYGEN SATURATION: 97 %

## 2023-06-01 LAB
ANION GAP SERPL CALCULATED.3IONS-SCNC: 10.3 MMOL/L (ref 5–15)
BUN SERPL-MCNC: 20 MG/DL (ref 8–23)
BUN/CREAT SERPL: 21.7 (ref 7–25)
CALCIUM SPEC-SCNC: 9.6 MG/DL (ref 8.6–10.5)
CHLORIDE SERPL-SCNC: 106 MMOL/L (ref 98–107)
CO2 SERPL-SCNC: 24.7 MMOL/L (ref 22–29)
CREAT SERPL-MCNC: 0.92 MG/DL (ref 0.57–1)
DEPRECATED RDW RBC AUTO: 44.9 FL (ref 37–54)
EGFRCR SERPLBLD CKD-EPI 2021: 69.2 ML/MIN/1.73
ERYTHROCYTE [DISTWIDTH] IN BLOOD BY AUTOMATED COUNT: 13.4 % (ref 12.3–15.4)
GLUCOSE SERPL-MCNC: 165 MG/DL (ref 65–99)
HCT VFR BLD AUTO: 38 % (ref 34–46.6)
HGB BLD-MCNC: 12.6 G/DL (ref 12–15.9)
MCH RBC QN AUTO: 30.1 PG (ref 26.6–33)
MCHC RBC AUTO-ENTMCNC: 33.2 G/DL (ref 31.5–35.7)
MCV RBC AUTO: 90.9 FL (ref 79–97)
PLATELET # BLD AUTO: 288 10*3/MM3 (ref 140–450)
PMV BLD AUTO: 10.2 FL (ref 6–12)
POTASSIUM SERPL-SCNC: 4.5 MMOL/L (ref 3.5–5.2)
RBC # BLD AUTO: 4.18 10*6/MM3 (ref 3.77–5.28)
SODIUM SERPL-SCNC: 141 MMOL/L (ref 136–145)
WBC NRBC COR # BLD: 6.83 10*3/MM3 (ref 3.4–10.8)

## 2023-06-01 PROCEDURE — 85027 COMPLETE CBC AUTOMATED: CPT

## 2023-06-01 PROCEDURE — 36415 COLL VENOUS BLD VENIPUNCTURE: CPT

## 2023-06-01 PROCEDURE — 80048 BASIC METABOLIC PNL TOTAL CA: CPT

## 2023-06-01 RX ORDER — LANOLIN ALCOHOL/MO/W.PET/CERES
1000 CREAM (GRAM) TOPICAL DAILY
COMMUNITY

## 2023-06-01 NOTE — DISCHARGE INSTRUCTIONS
Take the following medications the morning of surgery:    NOP MEDS AM OF SURGERY BRING YOUR INHALER WITH YOU    TIME OF ARRIVAL WILL BE CALLED TO YOU ONE TO TWO DAYS BEFORE SURGERY      If you are on prescription narcotic pain medication to control your pain you may also take that medication the morning of surgery.    General Instructions:  Do not eat solid food after midnight the night before surgery.  You may drink clear liquids day of surgery but must stop at least one hour before your hospital arrival time.  It is beneficial for you to have a clear drink that contains carbohydrates the day of surgery.  We suggest a 12 to 20 ounce bottle of Gatorade or Powerade for non-diabetic patients or a 12 to 20 ounce bottle of G2 or Powerade Zero for diabetic patients. (Pediatric patients, are not advised to drink a 12 to 20 ounce carbohydrate drink)    Clear liquids are liquids you can see through.  Nothing red in color.     Plain water                               Sports drinks  Sodas                                   Gelatin (Jell-O)  Fruit juices without pulp such as white grape juice and apple juice  Popsicles that contain no fruit or yogurt  Tea or coffee (no cream or milk added)  Gatorade / Powerade  G2 / Powerade Zero    Infants may have breast milk up to four hours before surgery.  Infants drinking formula may drink formula up to six hours before surgery.   Patients who avoid smoking, chewing tobacco and alcohol for 4 weeks prior to surgery have a reduced risk of post-operative complications.  Quit smoking as many days before surgery as you can.  Do not smoke, use chewing tobacco or drink alcohol the day of surgery.   If applicable bring your C-PAP/ BI-PAP machine in with you to preop day of surgery.  Bring any papers given to you in the doctor’s office.  Wear clean comfortable clothes.  Do not wear contact lenses, false eyelashes or make-up.  Bring a case for your glasses.   Bring crutches or walker if  applicable.  Remove all piercings.  Leave jewelry and any other valuables at home.  Hair extensions with metal clips must be removed prior to surgery.  The Pre-Admission Testing nurse will instruct you to bring medications if unable to obtain an accurate list in Pre-Admission Testing.      Preventing a Surgical Site Infection:  For 2 to 3 days before surgery, avoid shaving with a razor because the razor can irritate skin and make it easier to develop an infection.    Any areas of open skin can increase the risk of a post-operative wound infection by allowing bacteria to enter and travel throughout the body.  Notify your surgeon if you have any skin wounds / rashes even if it is not near the expected surgical site.  The area will need assessed to determine if surgery should be delayed until it is healed.  The night prior to surgery shower using a fresh bar of anti-bacterial soap (such as Dial) and clean washcloth.  Sleep in a clean bed with clean clothing.  Do not allow pets to sleep with you.  Shower on the morning of surgery using a fresh bar of anti-bacterial soap (such as Dial) and clean washcloth.  Dry with a clean towel and dress in clean clothing.  Ask your surgeon if you will be receiving antibiotics prior to surgery.  Make sure you, your family, and all healthcare providers clean their hands with soap and water or an alcohol based hand  before caring for you or your wound.    Day of surgery:  Your arrival time is approximately two hours before your scheduled surgery time.  Upon arrival, a Pre-op nurse and Anesthesiologist will review your health history, obtain vital signs, and answer questions you may have.  The only belongings needed at this time will be a list of your home medications and if applicable your C-PAP/BI-PAP machine.  A Pre-op nurse will start an IV and you may receive medication in preparation for surgery, including something to help you relax.     Please be aware that surgery does  come with discomfort.  We want to make every effort to control your discomfort so please discuss any uncontrolled symptoms with your nurse.   Your doctor will most likely have prescribed pain medications.      If you are going home after surgery you will receive individualized written care instructions before being discharged.  A responsible adult must drive you to and from the hospital on the day of your surgery and stay with you for 24 hours.  Discharge prescriptions can be filled by the hospital pharmacy during regular pharmacy hours.  If you are having surgery late in the day/evening your prescription may be e-prescribed to your pharmacy.  Please verify your pharmacy hours or chose a 24 hour pharmacy to avoid not having access to your prescription because your pharmacy has closed for the day.    If you are staying overnight following surgery, you will be transported to your hospital room following the recovery period.  [facility] has all private rooms.    If you have any questions please call Pre-Admission Testing at [phone].  Deductibles and co-payments are collected on the day of service. Please be prepared to pay the required co-pay, deductible or deposit on the day of service as defined by your plan.    Call your surgeon immediately if you experience any of the following symptoms:  Sore Throat  Shortness of Breath or difficulty breathing  Cough  Chills  Body soreness or muscle pain  Headache  Fever  New loss of taste or smell  Do not arrive for your surgery ill.  Your procedure will need to be rescheduled to another time.  You will need to call your physician before the day of surgery to avoid any unnecessary exposure to hospital staff as well as other patients.       CHLORHEXIDINE CLOTH INSTRUCTIONS  The morning of surgery follow these instructions using the Chlorhexidine cloths you've been given.  These steps reduce bacteria on the body.  Do not use the cloths near your eyes, ears  mouth, genitalia or on open wounds.  Throw the cloths away after use but do not try to flush them down a toilet.      Open and remove one cloth at a time from the package.    Leave the cloth unfolded and begin the bathing.  Massage the skin with the cloths using gentle pressure to remove bacteria.  Do not scrub harshly.   Follow the steps below with one 2% CHG cloth per area (6 total cloths).  One cloth for neck, shoulders and chest.  One cloth for both arms, hands, fingers and underarms (do underarms last).  One cloth for the abdomen followed by groin.  One cloth for right leg and foot including between the toes.  One cloth for left leg and foot including between the toes.  The last cloth is to be used for the back of the neck, back and buttocks.    Allow the CHG to air dry 3 minutes on the skin which will give it time to work and decrease the chance of irritation.  The skin may feel sticky until it is dry.  Do not rinse with water or any other liquid or you will lose the beneficial effects of the CHG.  If mild skin irritation occurs, do rinse the skin to remove the CHG.  Report this to the nurse at time of admission.  Do not apply lotions, creams, ointments, deodorants or perfumes after using the clothes. Dress in clean clothes before coming to the hospital.

## 2023-06-06 ENCOUNTER — TREATMENT (OUTPATIENT)
Dept: PHYSICAL THERAPY | Facility: CLINIC | Age: 63
End: 2023-06-06
Payer: MEDICARE

## 2023-06-06 ENCOUNTER — APPOINTMENT (OUTPATIENT)
Dept: GENERAL RADIOLOGY | Facility: HOSPITAL | Age: 63
End: 2023-06-06
Payer: MEDICARE

## 2023-06-06 ENCOUNTER — HOSPITAL ENCOUNTER (EMERGENCY)
Facility: HOSPITAL | Age: 63
Discharge: HOME | End: 2023-06-07
Attending: EMERGENCY MEDICINE
Payer: MEDICARE

## 2023-06-06 DIAGNOSIS — R00.2 PALPITATIONS: ICD-10-CM

## 2023-06-06 DIAGNOSIS — M17.12 ARTHRITIS OF LEFT KNEE: Primary | ICD-10-CM

## 2023-06-06 DIAGNOSIS — I10 HYPERTENSION, UNSPECIFIED TYPE: Primary | ICD-10-CM

## 2023-06-06 LAB
ALBUMIN SERPL-MCNC: 4.4 G/DL (ref 3.5–5.2)
ALBUMIN/GLOB SERPL: 1.7 G/DL
ALP SERPL-CCNC: 44 U/L (ref 39–117)
ALT SERPL W P-5'-P-CCNC: 17 U/L (ref 1–33)
ANION GAP SERPL CALCULATED.3IONS-SCNC: 11.2 MMOL/L (ref 5–15)
AST SERPL-CCNC: 14 U/L (ref 1–32)
BASOPHILS # BLD AUTO: 0.05 10*3/MM3 (ref 0–0.2)
BASOPHILS NFR BLD AUTO: 0.6 % (ref 0–1.5)
BILIRUB SERPL-MCNC: 0.7 MG/DL (ref 0–1.2)
BUN SERPL-MCNC: 18 MG/DL (ref 8–23)
BUN/CREAT SERPL: 22 (ref 7–25)
CALCIUM SPEC-SCNC: 9.7 MG/DL (ref 8.6–10.5)
CHLORIDE SERPL-SCNC: 103 MMOL/L (ref 98–107)
CO2 SERPL-SCNC: 24.8 MMOL/L (ref 22–29)
CREAT SERPL-MCNC: 0.82 MG/DL (ref 0.57–1)
DEPRECATED RDW RBC AUTO: 45 FL (ref 37–54)
EGFRCR SERPLBLD CKD-EPI 2021: 79.5 ML/MIN/1.73
EOSINOPHIL # BLD AUTO: 0.27 10*3/MM3 (ref 0–0.4)
EOSINOPHIL NFR BLD AUTO: 3.3 % (ref 0.3–6.2)
ERYTHROCYTE [DISTWIDTH] IN BLOOD BY AUTOMATED COUNT: 13.4 % (ref 12.3–15.4)
GLOBULIN UR ELPH-MCNC: 2.6 GM/DL
GLUCOSE SERPL-MCNC: 147 MG/DL (ref 65–99)
HCT VFR BLD AUTO: 39.2 % (ref 34–46.6)
HGB BLD-MCNC: 13 G/DL (ref 12–15.9)
IMM GRANULOCYTES # BLD AUTO: 0.02 10*3/MM3 (ref 0–0.05)
IMM GRANULOCYTES NFR BLD AUTO: 0.2 % (ref 0–0.5)
LYMPHOCYTES # BLD AUTO: 3.26 10*3/MM3 (ref 0.7–3.1)
LYMPHOCYTES NFR BLD AUTO: 39.6 % (ref 19.6–45.3)
MCH RBC QN AUTO: 30 PG (ref 26.6–33)
MCHC RBC AUTO-ENTMCNC: 33.2 G/DL (ref 31.5–35.7)
MCV RBC AUTO: 90.5 FL (ref 79–97)
MONOCYTES # BLD AUTO: 1 10*3/MM3 (ref 0.1–0.9)
MONOCYTES NFR BLD AUTO: 12.2 % (ref 5–12)
NEUTROPHILS NFR BLD AUTO: 3.63 10*3/MM3 (ref 1.7–7)
NEUTROPHILS NFR BLD AUTO: 44.1 % (ref 42.7–76)
PLATELET # BLD AUTO: 310 10*3/MM3 (ref 140–450)
PMV BLD AUTO: 10.1 FL (ref 6–12)
POTASSIUM SERPL-SCNC: 3.6 MMOL/L (ref 3.5–5.2)
PROT SERPL-MCNC: 7 G/DL (ref 6–8.5)
RBC # BLD AUTO: 4.33 10*6/MM3 (ref 3.77–5.28)
SODIUM SERPL-SCNC: 139 MMOL/L (ref 136–145)
TROPONIN T SERPL HS-MCNC: 16 NG/L
WBC NRBC COR # BLD: 8.23 10*3/MM3 (ref 3.4–10.8)

## 2023-06-06 PROCEDURE — 80053 COMPREHEN METABOLIC PANEL: CPT

## 2023-06-06 PROCEDURE — 97530 THERAPEUTIC ACTIVITIES: CPT | Performed by: PHYSICAL THERAPIST

## 2023-06-06 PROCEDURE — 99284 EMERGENCY DEPT VISIT MOD MDM: CPT

## 2023-06-06 PROCEDURE — 84484 ASSAY OF TROPONIN QUANT: CPT

## 2023-06-06 PROCEDURE — 71045 X-RAY EXAM CHEST 1 VIEW: CPT

## 2023-06-06 PROCEDURE — 83735 ASSAY OF MAGNESIUM: CPT | Performed by: EMERGENCY MEDICINE

## 2023-06-06 PROCEDURE — 85025 COMPLETE CBC W/AUTO DIFF WBC: CPT

## 2023-06-06 PROCEDURE — 97110 THERAPEUTIC EXERCISES: CPT | Performed by: PHYSICAL THERAPIST

## 2023-06-06 RX ORDER — CLONIDINE HYDROCHLORIDE 0.1 MG/1
0.1 TABLET ORAL ONCE
Status: COMPLETED | OUTPATIENT
Start: 2023-06-06 | End: 2023-06-06

## 2023-06-06 RX ORDER — ASPIRIN 81 MG/1
324 TABLET, CHEWABLE ORAL ONCE
Status: COMPLETED | OUTPATIENT
Start: 2023-06-06 | End: 2023-06-06

## 2023-06-06 RX ORDER — SODIUM CHLORIDE 0.9 % (FLUSH) 0.9 %
10 SYRINGE (ML) INJECTION AS NEEDED
Status: DISCONTINUED | OUTPATIENT
Start: 2023-06-06 | End: 2023-06-07 | Stop reason: HOSPADM

## 2023-06-06 RX ADMIN — CLONIDINE HYDROCHLORIDE 0.1 MG: 0.1 TABLET ORAL at 23:24

## 2023-06-06 RX ADMIN — ASPIRIN 324 MG: 81 TABLET, CHEWABLE ORAL at 23:24

## 2023-06-06 NOTE — PROGRESS NOTES
Physical Therapy Daily Note    Patient Name: Alisha Connolly         :  1960  Referring Physician: Tomás Rasmussen MD  Treatment Date: 2023  Date of Initial Visit: Type: THERAPY  Noted: 3/15/2023    Visit Diagnoses:    ICD-10-CM ICD-9-CM   1. Arthritis of left knee  M17.12 716.96       _____________________________________________________    Subjective   Alisha Connolly reports: knee not bothering her much today - Was more active over weekend and had to ice a lot -   Yesterday  / 90+ and a HA - Went most of day before w/o meds -   BP was 165 / 88 this morning - no HA - Has not told MD  Currently taking BP meds - has not changed recently -     Objective   In clinic: HR 75;  Blood Ox 98;  /85 at start of PT at 1658    EXERCISES:   SUPINE ISOMETRIC LEG PRESS w/ BALL 20/5 sec ea (B) / (L)  SUPINE HS CURLS w/ LEGS on BALL x 20 / 3 sec hold w/ core emphasis  BRIDGES w/ LEGS on BALL 20/5 sec ea  SIT / STAND x 20 (bed at 20 in off floor)   HELDER LEG PRESS; (B) / (L) x 25 ea 105#   HELDER HIP ABD 45# x 25 ea  HELDER HIP ADD 65# x 30 ea   STEP UP FWD Hole 3 x 15 ea  LAT STEP UP w/ ABD Hole 2-3 x 15 ea  SIDE STEP 30 ft ea  MONSTER WALK (retro) 30 ft x 2     OCTANE: Seat 3; Tilt 3; L2   Fwd/Retro  6 min (3/3)     Functional / Therapeutic Activities:    TAPING / BRACING: K-tape to Unload / Stabilize medial knee w/ 2 medial strips w/ X strip; 1 lateral strip - to decrease pain and improve gait / functional ability - HELD TODAY  Assessed BP, HR, Blood ox - discussed going to ER tonight if BP gets worse and to contact Primary Care Provider first thing in AM to notify them about her sudden rise in BP -   See exercises           Assessment/Plan  64 yo female: (R) Ankle fx 2022;  (L) knee pain - To have (L) TKA 2023  Pain calmed down in (L) knee -   Onset of HBP this week - Pt should contact Primary care in AM and advised to go to ER if BP gets any higher tonight -      Progress strengthening /stabilization  /functional activity / HEP that does not increase her pain -  Continue until TKA 6/20/2023 for pre-hab     _________________________________________________     Manual Therapy:                mins  36263;  Therapeutic Exercise:   20    mins  19959;     Neuromuscular Ivan:        mins  54750;    Therapeutic Activity:      20      mins  10750;     Ultrasound:                          mins  58979;  Iontophoresis:                     mins  01987;    Electrical Stimulation:         mins  48568 ( );  Mechanical Traction:          mins  62468  Dry Needling                       mins self-pay     Timed Treatment:   40    mins                  Total Treatment:     40    mins        Evelio Dumont PT  Physical Therapist  Lic # [id number]

## 2023-06-07 ENCOUNTER — TELEPHONE (OUTPATIENT)
Dept: FAMILY MEDICINE CLINIC | Facility: CLINIC | Age: 63
End: 2023-06-07
Payer: MEDICARE

## 2023-06-07 ENCOUNTER — APPOINTMENT (OUTPATIENT)
Dept: GENERAL RADIOLOGY | Facility: HOSPITAL | Age: 63
End: 2023-06-07
Payer: MEDICARE

## 2023-06-07 VITALS
TEMPERATURE: 97.7 F | BODY MASS INDEX: 37.56 KG/M2 | DIASTOLIC BLOOD PRESSURE: 77 MMHG | SYSTOLIC BLOOD PRESSURE: 149 MMHG | WEIGHT: 212 LBS | RESPIRATION RATE: 16 BRPM | OXYGEN SATURATION: 98 % | HEART RATE: 68 BPM | HEIGHT: 63 IN

## 2023-06-07 VITALS
HEIGHT: 63 IN | WEIGHT: 212 LBS | RESPIRATION RATE: 24 BRPM | DIASTOLIC BLOOD PRESSURE: 64 MMHG | SYSTOLIC BLOOD PRESSURE: 133 MMHG | BODY MASS INDEX: 37.56 KG/M2 | HEART RATE: 75 BPM | OXYGEN SATURATION: 95 % | TEMPERATURE: 98.3 F

## 2023-06-07 DIAGNOSIS — R77.8 ELEVATED TROPONIN: ICD-10-CM

## 2023-06-07 DIAGNOSIS — R00.2 PALPITATIONS: Primary | ICD-10-CM

## 2023-06-07 DIAGNOSIS — I10 UNCONTROLLED HYPERTENSION: ICD-10-CM

## 2023-06-07 LAB
ALBUMIN SERPL-MCNC: 4.3 G/DL (ref 3.5–5.2)
ALBUMIN/GLOB SERPL: 1.7 G/DL
ALP SERPL-CCNC: 44 U/L (ref 39–117)
ALT SERPL W P-5'-P-CCNC: 21 U/L (ref 1–33)
ANION GAP SERPL CALCULATED.3IONS-SCNC: 10 MMOL/L (ref 5–15)
AST SERPL-CCNC: 21 U/L (ref 1–32)
BASOPHILS # BLD AUTO: 0.08 10*3/MM3 (ref 0–0.2)
BASOPHILS NFR BLD AUTO: 1.1 % (ref 0–1.5)
BILIRUB SERPL-MCNC: 0.8 MG/DL (ref 0–1.2)
BUN SERPL-MCNC: 16 MG/DL (ref 8–23)
BUN/CREAT SERPL: 18 (ref 7–25)
CALCIUM SPEC-SCNC: 9.5 MG/DL (ref 8.6–10.5)
CHLORIDE SERPL-SCNC: 104 MMOL/L (ref 98–107)
CO2 SERPL-SCNC: 25 MMOL/L (ref 22–29)
CREAT SERPL-MCNC: 0.89 MG/DL (ref 0.57–1)
DEPRECATED RDW RBC AUTO: 45.3 FL (ref 37–54)
EGFRCR SERPLBLD CKD-EPI 2021: 72.1 ML/MIN/1.73
EOSINOPHIL # BLD AUTO: 0.23 10*3/MM3 (ref 0–0.4)
EOSINOPHIL NFR BLD AUTO: 3.3 % (ref 0.3–6.2)
ERYTHROCYTE [DISTWIDTH] IN BLOOD BY AUTOMATED COUNT: 13.8 % (ref 12.3–15.4)
GEN 5 2HR TROPONIN T REFLEX: 18 NG/L
GLOBULIN UR ELPH-MCNC: 2.6 GM/DL
GLUCOSE SERPL-MCNC: 120 MG/DL (ref 65–99)
HCT VFR BLD AUTO: 39.1 % (ref 34–46.6)
HGB BLD-MCNC: 13 G/DL (ref 12–15.9)
HOLD SPECIMEN: NORMAL
HOLD SPECIMEN: NORMAL
IMM GRANULOCYTES # BLD AUTO: 0.02 10*3/MM3 (ref 0–0.05)
IMM GRANULOCYTES NFR BLD AUTO: 0.3 % (ref 0–0.5)
LYMPHOCYTES # BLD AUTO: 2.73 10*3/MM3 (ref 0.7–3.1)
LYMPHOCYTES NFR BLD AUTO: 39.1 % (ref 19.6–45.3)
MAGNESIUM SERPL-MCNC: 1.9 MG/DL (ref 1.6–2.4)
MCH RBC QN AUTO: 30 PG (ref 26.6–33)
MCHC RBC AUTO-ENTMCNC: 33.2 G/DL (ref 31.5–35.7)
MCV RBC AUTO: 90.1 FL (ref 79–97)
MONOCYTES # BLD AUTO: 0.71 10*3/MM3 (ref 0.1–0.9)
MONOCYTES NFR BLD AUTO: 10.2 % (ref 5–12)
NEUTROPHILS NFR BLD AUTO: 3.21 10*3/MM3 (ref 1.7–7)
NEUTROPHILS NFR BLD AUTO: 46 % (ref 42.7–76)
NRBC BLD AUTO-RTO: 0 /100 WBC (ref 0–0.2)
PLATELET # BLD AUTO: 320 10*3/MM3 (ref 140–450)
PMV BLD AUTO: 10.2 FL (ref 6–12)
POTASSIUM SERPL-SCNC: 4.4 MMOL/L (ref 3.5–5.2)
PROT SERPL-MCNC: 6.9 G/DL (ref 6–8.5)
QT INTERVAL: 368 MS
RBC # BLD AUTO: 4.34 10*6/MM3 (ref 3.77–5.28)
SODIUM SERPL-SCNC: 139 MMOL/L (ref 136–145)
TROPONIN T DELTA: -1 NG/L
TROPONIN T SERPL HS-MCNC: 18 NG/L
TROPONIN T SERPL HS-MCNC: 19 NG/L
WBC NRBC COR # BLD: 6.98 10*3/MM3 (ref 3.4–10.8)
WHOLE BLOOD HOLD COAG: NORMAL
WHOLE BLOOD HOLD SPECIMEN: NORMAL

## 2023-06-07 PROCEDURE — 93005 ELECTROCARDIOGRAM TRACING: CPT

## 2023-06-07 PROCEDURE — 84484 ASSAY OF TROPONIN QUANT: CPT

## 2023-06-07 PROCEDURE — 80053 COMPREHEN METABOLIC PANEL: CPT

## 2023-06-07 PROCEDURE — 71046 X-RAY EXAM CHEST 2 VIEWS: CPT

## 2023-06-07 PROCEDURE — 93010 ELECTROCARDIOGRAM REPORT: CPT | Performed by: INTERNAL MEDICINE

## 2023-06-07 PROCEDURE — 36415 COLL VENOUS BLD VENIPUNCTURE: CPT

## 2023-06-07 PROCEDURE — 84484 ASSAY OF TROPONIN QUANT: CPT | Performed by: EMERGENCY MEDICINE

## 2023-06-07 PROCEDURE — 93005 ELECTROCARDIOGRAM TRACING: CPT | Performed by: EMERGENCY MEDICINE

## 2023-06-07 PROCEDURE — 85025 COMPLETE CBC W/AUTO DIFF WBC: CPT

## 2023-06-07 PROCEDURE — 99283 EMERGENCY DEPT VISIT LOW MDM: CPT

## 2023-06-07 RX ORDER — ASPIRIN 325 MG
325 TABLET ORAL ONCE
Status: COMPLETED | OUTPATIENT
Start: 2023-06-07 | End: 2023-06-07

## 2023-06-07 RX ORDER — CLONIDINE HYDROCHLORIDE 0.1 MG/1
0.1 TABLET ORAL 2 TIMES DAILY PRN
Qty: 15 TABLET | Refills: 0 | Status: SHIPPED | OUTPATIENT
Start: 2023-06-07

## 2023-06-07 RX ORDER — SODIUM CHLORIDE 0.9 % (FLUSH) 0.9 %
10 SYRINGE (ML) INJECTION AS NEEDED
Status: DISCONTINUED | OUTPATIENT
Start: 2023-06-07 | End: 2023-06-07 | Stop reason: HOSPADM

## 2023-06-07 RX ADMIN — ASPIRIN 325 MG: 325 TABLET ORAL at 15:36

## 2023-06-07 NOTE — FSED PROVIDER NOTE
Subjective   History of Present Illness  63-year-old female presents complaining of hypertension.  Patient states her blood pressure has been running high for the last several days.  She is not sure why, she reports compliance with her home lisinopril 40 mg QAM.  She has had a headache for the last couple of days but it is improved today.  She denies any chest pain or shortness of breath but states she has had some heart racing.  No fevers, cough, other complaints at this time.  She denies any history of cardiopulmonary disease.    History provided by:  Patient   used: No      Review of Systems   Constitutional: Negative.  Negative for fever.   Respiratory: Negative.  Negative for shortness of breath.    Cardiovascular:  Positive for palpitations. Negative for chest pain.   Gastrointestinal: Negative.  Negative for abdominal pain and vomiting.   Genitourinary: Negative.  Negative for dysuria.   Neurological:  Positive for headaches.   All other systems reviewed and are negative.    Past Medical History:   Diagnosis Date    Abnormal Pap smear of cervix     Age-related nuclear cataract of both eyes 02/03/2020    Anal fissure 10/2021    Anal skin tag 08/2022    S/P EXCISION    Ankle fracture 12/2022    RIGHT    Arthrodesis status 03/08/2014    Asthma     Benign essential hypertension 06/05/2015    Continue lisinopril    Carpal tunnel syndrome     PING    Cervical spondylolysis     Chronic neck pain 07/19/2016    Closed nondisplaced fracture of lateral malleolus of right fibula 12/10/2022    NO SURGERY, SEEN AT [facility]    Cystitis 11/2017    Dry eye syndrome 02/03/2020    Environmental allergies     Exogenous hypertriglyceridemia 06/05/2017    Ganglion cyst of finger 01/2020    Hemorrhoid     Hemorrhoids     History of COVID-19 05/2022    History of torn meniscus of left knee     Hormone replacement therapy (HRT)     Hyperchylomicronemia     Hyperlipidemia 06/05/2015    ASCVD 10-year risk ~10% Plan: ·  Start statin. Unclear whether arm numbness really 2/2 statin in the past.    Hyperopia of both eyes 2020    Keratoconjunctivitis sicca 2020    Knee pain, left     Lichen sclerosus     Lung nodule 2006    Obstructive sleep apnea syndrome 2004    Not using CPAP due to eye issues    Palpitations     Pertussis 2016    [facility] ER    PNA (pneumonia)     PONV (postoperative nausea and vomiting)     Presbyopia 2020    Rotator cuff injury 2011    SEEN AT [facility] ER    Seasonal allergies     Slow to wake up after anesthesia     Snoring     Type 2 diabetes mellitus without complication 2015    Under good control Plan: · Reassured about use of metformin    Urinary urgency 2021       Allergies   Allergen Reactions    Penicillins Hives    Atorvastatin Paresthesia     Arm numbness            Rosuvastatin Paresthesia     Arm numbness      Latex Rash    Tetanus Toxoid, Adsorbed Rash       Past Surgical History:   Procedure Laterality Date    ANTERIOR CERVICAL DISCECTOMY W/ FUSION N/A 2014    C6-7, DR. ALEXEY IZAGUIRRE AT [facility]. TITANIUM PLATE    ANUS SURGERY N/A 2022    Procedure: ANAL TAG EXCISION;  Surgeon: Bradley Santiago MD;  Location:  [facility];  Service: General;  Laterality: N/A;  pre-anal tag  post-same    CARPAL TUNNEL RELEASE Bilateral      SECTION      COLONOSCOPY N/A     COLONOSCOPY N/A 2022    EXTERNAL AND INTERNAL HEMORRHOIDS, GRADE 3-BANDED, RESCOPE IN 10 YRS, DR. BRADLEY SANTIAGO [facility]    HEMORRHOIDECTOMY N/A 2022    Procedure: HEMORRHOID BANDING x3;  Surgeon: Bradley Santiago MD;  Location:  [facility];  Service: General;  Laterality: N/A;  pre-hemorrhoids  post-same    HYSTERECTOMY N/A     WITH BSO, FOR HEAVY BLEEDING    INCISIONAL BREAST BIOPSY Right     BENIGN    LAPAROSCOPIC LYSIS OF ADHESIONS N/A     OOPHORECTOMY      OOPHORECTOMY      SPHINCTEROTOMY N/A 2023    Procedure: EXAM UNDER ANESTHESIA,  CHEMICAL SPHINCTEROTOMY WITH BOTOX;  Surgeon: Sarai Perez MD;  Location: [facility];  Service: General;  Laterality: N/A;       Family History   Problem Relation Age of Onset    Arthritis Mother     Heart disease Mother     Arrhythmia Mother     Hyperlipidemia Father     Diabetes Father     Heart disease Father     Coronary artery disease Father     Diabetes Sister     Diabetes Paternal Grandmother     Cancer Cousin     Breast cancer Cousin     Cancer Maternal Great-Grandmother     Ovarian cancer Maternal Great-Grandmother     Malig Hyperthermia Neg Hx        Social History     Socioeconomic History    Marital status:    Tobacco Use    Smoking status: Former     Packs/day: 0.25     Years: 4.50     Pack years: 1.13     Types: Cigarettes     Quit date: 3/7/2014     Years since quittin.2     Passive exposure: Past    Smokeless tobacco: Never   Vaping Use    Vaping Use: Never used   Substance and Sexual Activity    Alcohol use: Yes     Comment: occ    Drug use: Never    Sexual activity: Defer     Partners: Male     Birth control/protection: Post-menopausal, Hysterectomy           Objective   Physical Exam  Vitals and nursing note reviewed.   Constitutional:       General: She is not in acute distress.     Appearance: She is not diaphoretic.   HENT:      Head: Normocephalic and atraumatic.      Right Ear: External ear normal.      Left Ear: External ear normal.      Nose: Nose normal.   Eyes:      Pupils: Pupils are equal, round, and reactive to light.   Cardiovascular:      Rate and Rhythm: Normal rate and regular rhythm.      Heart sounds: Normal heart sounds.   Pulmonary:      Effort: Pulmonary effort is normal. No respiratory distress.      Breath sounds: Normal breath sounds.   Abdominal:      Palpations: Abdomen is soft.      Tenderness: There is no abdominal tenderness.   Musculoskeletal:         General: No swelling or tenderness. Normal range of motion.      Cervical back: Normal range of  motion and neck supple.   Skin:     General: Skin is warm and dry.      Findings: No rash.   Neurological:      General: No focal deficit present.      Mental Status: She is alert and oriented to person, place, and time.      Cranial Nerves: No cranial nerve deficit.      Sensory: No sensory deficit.      Motor: No weakness.   Psychiatric:         Mood and Affect: Mood normal.         Behavior: Behavior normal.       Procedures       XR Chest 1 View   Final Result   FINDINGS AND IMPRESSION:   Cervical fusion hardware is not well evaluated. No pulmonary   consolidation, pleural effusion or pneumothorax is seen. Cardiac   silhouette is at the upper limits of normal to borderline enlarged in   size.       This report was finalized on 6/6/2023 11:25 PM by Dr. Aquiles Jeong M.D.                ED Course  ED Course as of 06/07/23 0237   Tue Jun 06, 2023 2211 EKG: Normal sinus rhythm, rate 79, , QRS 92, QTc 440, no STEMI. [DH]      ED Course User Index  [DH] Onesimo Jarvis MD                                           Medical Decision Making  Number and Complexity of Problems  Differential Diagnosis: Electrolyte abnormality, arrhythmia, hypertensive urgency, anxiety    MDM Data  My EKG interpretation: NSST  My Laboratory interpretation: CBC and chemistry unremarkable, HS troponin 16 -> 18 insignificant change likely baseline  My Radiology interpretation: NAD    Treatment and Disposition  ED Course: 63-year-old female presenting with complaints of hypertension and palpitations.  She has been stable on the monitor without any arrhythmias.  Her BP has dramatically improved with clonidine.  No clear etiology identified for her symptoms.  Basic labs were unremarkable.  Troponin in the indeterminate zone but no significant change and she has no symptoms concerning for ACS.  I had a long discussion with the patient about her testing results tonight.  She is feeling better, asymptomatic now and wants to go  home.  I think this is reasonable and that her BP was the primary issue.  Given this is an acute presentation, will prescribe as needed clonidine and have her follow-up closely with her PCP to add another scheduled medication if needed.  Strict return precautions discussed for chest pain, shortness of breath, any other concerning symptoms or if she changes her mind about needing to be admitted.    Shared decision making: Patient prefers discharge home, strict return precautions, close PCP and cardiology follow-up    Problems Addressed:  Hypertension, unspecified type: complicated acute illness or injury  Palpitations: complicated acute illness or injury    Amount and/or Complexity of Data Reviewed  Labs: ordered.  Radiology: ordered.  ECG/medicine tests: ordered.    Risk  OTC drugs.  Prescription drug management.        Final diagnoses:   Hypertension, unspecified type   Palpitations       ED Disposition  ED Disposition       ED Disposition   Discharge    Condition   Stable    Comment   --               Aarti Ogden, APRN  [address]  [facility] [address]  [place] [id number] [phone]    Schedule an appointment as soon as possible for a visit       Martina Fritz MD  [address] [facility] [address]  [place] [address]  [phone]    Schedule an appointment as soon as possible for a visit            Medication List        New Prescriptions      cloNIDine 0.1 MG tablet  Commonly known as: CATAPRES  Take 1 tablet by mouth 2 (Two) Times a Day As Needed for High Blood Pressure (SBP > 180, DBP > 110).            Changed      meloxicam 15 MG tablet  Commonly known as: Mobic  Take 1 tablet by mouth Daily.  What changed: additional instructions               Where to Get Your Medications        These medications were sent to [facility]  [address] [place] [address]      Hours: 7:00 AM-6:00 PM Mon-Fri, 8:00 AM-4:30 PM Sat-Sun (Closed 12-12:30PM) Phone: [phone]   cloNIDine 0.1 MG  tablet

## 2023-06-07 NOTE — DISCHARGE INSTRUCTIONS
I placed a referral to cardiology for follow-up.  They will call you with information for an appointment.  Follow-up with your primary care provider as scheduled.  Take the lisinopril as prescribed.  Take the clonidine as needed if your blood pressure is over 180 on the top.  Take all other home medications as prescribed.  Return to emergency department for any new or worsening symptoms.

## 2023-06-07 NOTE — ED NOTES
Patient already undressed , heart monitor's attached.    Obtained Vitals , Nurse notified of patient Hypertension.   Warm [place] Given.   No patient needs at this time .

## 2023-06-07 NOTE — ED NOTES
Patient from [facility] er with c/o hypertension and told to come to ER for elevated heart enzymes.

## 2023-06-07 NOTE — ED PROVIDER NOTES
EMERGENCY DEPARTMENT ENCOUNTER    Room Number:  02/02  Date seen:  6/7/2023  PCP: Aarti Ogden APRN      HPI:  Chief Complaint: Uncontrolled blood pressure    Context: Alisha Connolly is a 63 y.o. female who presents to the ED c/o elevated blood pressure that she has noticed for the past 4 to 5 days.  Patient reports blood pressure has gotten as high as 180s systolic at home despite compliance with her home lisinopril.  She has noticed intermittent global headache and palpitations that seem to be worse at night.  Patient went to outside ED yesterday where she was evaluated with labs, told to follow-up with PCP and cardiology.  She was discharged with a prescription for clonidine to use as needed for significantly elevated blood pressure.  Patient reports she attempted to call cardiology office today to schedule appointment and was told her primary care provider would have to place a referral.  She then called her primary care office who instructed her to return immediately to the ED.  Patient denies history of CAD.  Patient denies syncope, chest pain, dyspnea, leg swelling, or any other systemic complaint.  Patient denies current tobacco use.      External (non-ED) record review:   Reviewed note from office visit with PCP on 5/1/2023 where patient seen for diabetes, hypertension, and hypertriglyceridemia.  Reviewed assessment and plan.  Patient to continue current medications, follow-up as scheduled.  Reviewed most recent laboratory studies.  Most recent CBC with hemoglobin 13.0.  Most recent CMP with creatinine 0.82.      PAST MEDICAL HISTORY  Active Ambulatory Problems     Diagnosis Date Noted    Age-related nuclear cataract of both eyes 02/03/2020    Arthrodesis status 03/08/2014    Benign essential hypertension 06/05/2015    Chronic neck pain 07/19/2016    Dry eye syndrome 02/03/2020    Exogenous hypertriglyceridemia 06/05/2017    S/P hysterectomy with oophorectomy 11/22/2011    Hyperlipidemia 06/05/2015     Hyperopia of both eyes 2020    Hypertension 2011    Keratoconjunctivitis sicca 2020    Lung nodule 2006    History of solitary pulmonary nodule 2006    Presbyopia 2020    Type 2 diabetes mellitus without complication 2015    Anal skin tag 2022    Encounter for screening colonoscopy 2022    Hemorrhoids 2022    Closed nondisplaced fracture of lateral malleolus of right fibula 2022    Anal fissure 2023    Arthritis of left knee 2023     Resolved Ambulatory Problems     Diagnosis Date Noted    Obstructive sleep apnea syndrome 2004     Past Medical History:   Diagnosis Date    Abnormal Pap smear of cervix     Ankle fracture 2022    Asthma     Carpal tunnel syndrome     Cervical spondylolysis     Cystitis 2017    Environmental allergies     Ganglion cyst of finger 2020    Hemorrhoid     History of COVID-19 2022    History of torn meniscus of left knee     Hormone replacement therapy (HRT)     Hyperchylomicronemia     Knee pain, left     Lichen sclerosus     Palpitations     Pertussis 2016    PNA (pneumonia)     PONV (postoperative nausea and vomiting)     Rotator cuff injury 2011    Seasonal allergies     Slow to wake up after anesthesia     Snoring     Urinary urgency 2021         PAST SURGICAL HISTORY  Past Surgical History:   Procedure Laterality Date    ANTERIOR CERVICAL DISCECTOMY W/ FUSION N/A 2014    C6-7, DR. ALEXEY IZAGUIRRE AT [facility]. TITANIUM PLATE    ANUS SURGERY N/A 2022    Procedure: ANAL TAG EXCISION;  Surgeon: Bradley Santiago MD;  Location: [facility] ENDOSCOPY;  Service: General;  Laterality: N/A;  pre-anal tag  post-same    CARPAL TUNNEL RELEASE Bilateral      SECTION      COLONOSCOPY N/A     COLONOSCOPY N/A 2022    EXTERNAL AND INTERNAL HEMORRHOIDS, GRADE 3-BANDED, RESCOPE IN 10 YRS, DR. BRADLEY SANTIAGO AT [facility]    HEMORRHOIDECTOMY N/A 2022    Procedure: HEMORRHOID  BANDING x3;  Surgeon: Sarai Perez MD;  Location: [facility] ENDOSCOPY;  Service: General;  Laterality: N/A;  pre-hemorrhoids  post-same    HYSTERECTOMY N/A     WITH BSO, FOR HEAVY BLEEDING    INCISIONAL BREAST BIOPSY Right     BENIGN    LAPAROSCOPIC LYSIS OF ADHESIONS N/A     OOPHORECTOMY      OOPHORECTOMY      SPHINCTEROTOMY N/A 2023    Procedure: EXAM UNDER ANESTHESIA, CHEMICAL SPHINCTEROTOMY WITH BOTOX;  Surgeon: Sarai Perez MD;  Location: [facility] MAIN OR;  Service: General;  Laterality: N/A;         FAMILY HISTORY  Family History   Problem Relation Age of Onset    Arthritis Mother     Heart disease Mother     Arrhythmia Mother     Hyperlipidemia Father     Diabetes Father     Heart disease Father     Coronary artery disease Father     Diabetes Sister     Diabetes Paternal Grandmother     Cancer Cousin     Breast cancer Cousin     Cancer Maternal Great-Grandmother     Ovarian cancer Maternal Great-Grandmother     Malig Hyperthermia Neg Hx          SOCIAL HISTORY  Social History     Socioeconomic History    Marital status:    Tobacco Use    Smoking status: Former     Packs/day: 0.25     Years: 4.50     Pack years: 1.13     Types: Cigarettes     Quit date: 3/7/2014     Years since quittin.2     Passive exposure: Past    Smokeless tobacco: Never   Vaping Use    Vaping Use: Never used   Substance and Sexual Activity    Alcohol use: Yes     Comment: occ    Drug use: Never    Sexual activity: Defer     Partners: Male     Birth control/protection: Post-menopausal, Hysterectomy         ALLERGIES  Penicillins; Atorvastatin; Rosuvastatin; Latex; and Tetanus toxoid, adsorbed        REVIEW OF SYSTEMS  Review of Systems   Constitutional:  Negative for chills and fever.   HENT:  Negative for ear pain and sore throat.    Respiratory:  Negative for cough and shortness of breath.    Cardiovascular:  Positive for palpitations. Negative for chest pain.   Gastrointestinal:  Negative for abdominal pain  and vomiting.   Genitourinary:  Negative for dysuria and hematuria.   Musculoskeletal:  Negative for arthralgias and joint swelling.   Skin:  Negative for pallor and rash.   Neurological:  Positive for headaches. Negative for numbness.   Psychiatric/Behavioral:  Negative for confusion and hallucinations.           PHYSICAL EXAM  ED Triage Vitals   Temp Heart Rate Resp BP SpO2   06/07/23 1259 06/07/23 1259 06/07/23 1259 06/07/23 1300 06/07/23 1259   97.7 °F (36.5 °C) 85 16 177/82 97 %      Temp src Heart Rate Source Patient Position BP Location FiO2 (%)   -- -- -- -- --              Physical Exam  Constitutional:       General: She is not in acute distress.     Appearance: She is well-developed.   HENT:      Head: Normocephalic and atraumatic.   Eyes:      Extraocular Movements: Extraocular movements intact.   Cardiovascular:      Rate and Rhythm: Normal rate and regular rhythm.      Heart sounds: Normal heart sounds.      Comments: Distal pulses intact  Pulmonary:      Effort: Pulmonary effort is normal.      Breath sounds: Normal breath sounds.   Abdominal:      General: There is no distension.   Skin:     General: Skin is warm.   Neurological:      General: No focal deficit present.      Mental Status: She is alert and oriented to person, place, and time.   Psychiatric:         Mood and Affect: Mood normal.       Vital signs and nursing notes reviewed.          LAB RESULTS  Recent Results (from the past 24 hour(s))   Comprehensive Metabolic Panel    Collection Time: 06/06/23 10:01 PM    Specimen: Blood   Result Value Ref Range    Glucose 147 (H) 65 - 99 mg/dL    BUN 18 8 - 23 mg/dL    Creatinine 0.82 0.57 - 1.00 mg/dL    Sodium 139 136 - 145 mmol/L    Potassium 3.6 3.5 - 5.2 mmol/L    Chloride 103 98 - 107 mmol/L    CO2 24.8 22.0 - 29.0 mmol/L    Calcium 9.7 8.6 - 10.5 mg/dL    Total Protein 7.0 6.0 - 8.5 g/dL    Albumin 4.4 3.5 - 5.2 g/dL    ALT (SGPT) 17 1 - 33 U/L    AST (SGOT) 14 1 - 32 U/L    Alkaline  Phosphatase 44 39 - 117 U/L    Total Bilirubin 0.7 0.0 - 1.2 mg/dL    Globulin 2.6 gm/dL    A/G Ratio 1.7 g/dL    BUN/Creatinine Ratio 22.0 7.0 - 25.0    Anion Gap 11.2 5.0 - 15.0 mmol/L    eGFR 79.5 >60.0 mL/min/1.73   Magnesium    Collection Time: 06/06/23 10:01 PM    Specimen: Blood   Result Value Ref Range    Magnesium 1.9 1.6 - 2.4 mg/dL   Single High Sensitivity Troponin T    Collection Time: 06/06/23 10:01 PM    Specimen: Blood   Result Value Ref Range    HS Troponin T 16 (H) <10 ng/L   CBC Auto Differential    Collection Time: 06/06/23 10:01 PM    Specimen: Blood   Result Value Ref Range    WBC 8.23 3.40 - 10.80 10*3/mm3    RBC 4.33 3.77 - 5.28 10*6/mm3    Hemoglobin 13.0 12.0 - 15.9 g/dL    Hematocrit 39.2 34.0 - 46.6 %    MCV 90.5 79.0 - 97.0 fL    MCH 30.0 26.6 - 33.0 pg    MCHC 33.2 31.5 - 35.7 g/dL    RDW 13.4 12.3 - 15.4 %    RDW-SD 45.0 37.0 - 54.0 fl    MPV 10.1 6.0 - 12.0 fL    Platelets 310 140 - 450 10*3/mm3    Neutrophil % 44.1 42.7 - 76.0 %    Lymphocyte % 39.6 19.6 - 45.3 %    Monocyte % 12.2 (H) 5.0 - 12.0 %    Eosinophil % 3.3 0.3 - 6.2 %    Basophil % 0.6 0.0 - 1.5 %    Immature Grans % 0.2 0.0 - 0.5 %    Neutrophils, Absolute 3.63 1.70 - 7.00 10*3/mm3    Lymphocytes, Absolute 3.26 (H) 0.70 - 3.10 10*3/mm3    Monocytes, Absolute 1.00 (H) 0.10 - 0.90 10*3/mm3    Eosinophils, Absolute 0.27 0.00 - 0.40 10*3/mm3    Basophils, Absolute 0.05 0.00 - 0.20 10*3/mm3    Immature Grans, Absolute 0.02 0.00 - 0.05 10*3/mm3   Single High Sensitivity Troponin T    Collection Time: 06/07/23 12:08 AM    Specimen: Blood   Result Value Ref Range    HS Troponin T 18 (H) <10 ng/L   ECG 12 Lead ED Triage Standing Order; Chest Pain    Collection Time: 06/07/23  1:05 PM   Result Value Ref Range    QT Interval 368 ms   Comprehensive Metabolic Panel    Collection Time: 06/07/23  1:12 PM    Specimen: Blood   Result Value Ref Range    Glucose 120 (H) 65 - 99 mg/dL    BUN 16 8 - 23 mg/dL    Creatinine 0.89 0.57 - 1.00  mg/dL    Sodium 139 136 - 145 mmol/L    Potassium 4.4 3.5 - 5.2 mmol/L    Chloride 104 98 - 107 mmol/L    CO2 25.0 22.0 - 29.0 mmol/L    Calcium 9.5 8.6 - 10.5 mg/dL    Total Protein 6.9 6.0 - 8.5 g/dL    Albumin 4.3 3.5 - 5.2 g/dL    ALT (SGPT) 21 1 - 33 U/L    AST (SGOT) 21 1 - 32 U/L    Alkaline Phosphatase 44 39 - 117 U/L    Total Bilirubin 0.8 0.0 - 1.2 mg/dL    Globulin 2.6 gm/dL    A/G Ratio 1.7 g/dL    BUN/Creatinine Ratio 18.0 7.0 - 25.0    Anion Gap 10.0 5.0 - 15.0 mmol/L    eGFR 72.1 >60.0 mL/min/1.73   High Sensitivity Troponin T    Collection Time: 06/07/23  1:12 PM    Specimen: Blood   Result Value Ref Range    HS Troponin T 19 (H) <10 ng/L   Green Top (Gel)    Collection Time: 06/07/23  1:12 PM   Result Value Ref Range    Extra Tube Hold for add-ons.    Lavender Top    Collection Time: 06/07/23  1:12 PM   Result Value Ref Range    Extra Tube hold for add-on    Gold Top - SST    Collection Time: 06/07/23  1:12 PM   Result Value Ref Range    Extra Tube Hold for add-ons.    Light Blue Top    Collection Time: 06/07/23  1:12 PM   Result Value Ref Range    Extra Tube Hold for add-ons.    CBC Auto Differential    Collection Time: 06/07/23  1:12 PM    Specimen: Blood   Result Value Ref Range    WBC 6.98 3.40 - 10.80 10*3/mm3    RBC 4.34 3.77 - 5.28 10*6/mm3    Hemoglobin 13.0 12.0 - 15.9 g/dL    Hematocrit 39.1 34.0 - 46.6 %    MCV 90.1 79.0 - 97.0 fL    MCH 30.0 26.6 - 33.0 pg    MCHC 33.2 31.5 - 35.7 g/dL    RDW 13.8 12.3 - 15.4 %    RDW-SD 45.3 37.0 - 54.0 fl    MPV 10.2 6.0 - 12.0 fL    Platelets 320 140 - 450 10*3/mm3    Neutrophil % 46.0 42.7 - 76.0 %    Lymphocyte % 39.1 19.6 - 45.3 %    Monocyte % 10.2 5.0 - 12.0 %    Eosinophil % 3.3 0.3 - 6.2 %    Basophil % 1.1 0.0 - 1.5 %    Immature Grans % 0.3 0.0 - 0.5 %    Neutrophils, Absolute 3.21 1.70 - 7.00 10*3/mm3    Lymphocytes, Absolute 2.73 0.70 - 3.10 10*3/mm3    Monocytes, Absolute 0.71 0.10 - 0.90 10*3/mm3    Eosinophils, Absolute 0.23 0.00 - 0.40  10*3/mm3    Basophils, Absolute 0.08 0.00 - 0.20 10*3/mm3    Immature Grans, Absolute 0.02 0.00 - 0.05 10*3/mm3    nRBC 0.0 0.0 - 0.2 /100 WBC   High Sensitivity Troponin T 2Hr    Collection Time: 06/07/23  3:38 PM    Specimen: Arm, Left; Blood   Result Value Ref Range    HS Troponin T 18 (H) <10 ng/L    Troponin T Delta -1 >=-4 - <+4 ng/L       Ordered the above labs and reviewed the results.        RADIOLOGY  XR Chest 2 View    Result Date: 6/7/2023  XR CHEST 2 VW-  HISTORY: Female who is 63 years-old,  chest pain  TECHNIQUE: Frontal and lateral views of the chest  COMPARISON: 06/06/2023  FINDINGS: The heart is enlarged. Pulmonary vasculature is unremarkable. Minimal likely atelectasis or scarring in the left lower lung. No focal pulmonary consolidation, pleural effusion, or pneumothorax. No acute osseous process.      No focal pulmonary consolidation. Cardiomegaly. Follow-up as clinical indications persist.  This report was finalized on 6/7/2023 1:38 PM by Dr. Lobo Khalil M.D.      Ordered the above noted radiological studies. Reviewed by me in PACS.                  MEDICATIONS GIVEN IN ER  Medications   sodium chloride 0.9 % flush 10 mL (has no administration in time range)   aspirin tablet 325 mg (325 mg Oral Given 6/7/23 1536)                   MEDICAL DECISION MAKING, PROGRESS, and CONSULTS    All labs have been independently reviewed by me.  All radiology studies have been reviewed by me and I have also reviewed the radiology report.   EKG's independently viewed and interpreted by me.  Discussion below represents my analysis of pertinent findings related to patient's condition, differential diagnosis, treatment plan and final disposition.      Additional sources:    - Chronic or social conditions impacting care: Diabetes, hypertension          Orders placed during this visit:  Orders Placed This Encounter   Procedures    XR Chest 2 View    [place] Draw    Comprehensive Metabolic Panel    High  Sensitivity Troponin T    CBC Auto Differential    High Sensitivity Troponin T 2Hr    Ambulatory Referral to Cardiology    NPO Diet NPO Type: Strict NPO    Undress and Gown    Continuous Pulse Oximetry    Oxygen Therapy- Nasal Cannula; Titrate 1-6 LPM Per SpO2; 90 - 95%    ECG 12 Lead ED Triage Standing Order; Chest Pain    ECG 12 Lead ED Triage Standing Order; Chest Pain    Insert Peripheral IV    CBC & Differential    Green Top (Gel)    Lavender Top    Gold Top - SST    Light Blue Top         Additional orders considered but not ordered:  Hydralazine    Differential diagnosis:  Uncontrolled hypertension, PVCs, paroxysmal atrial fibrillation      Independent interpretation of labs, radiology studies, and discussions with consultants:  ED Course as of 06/07/23 1810   Wed Jun 07, 2023   1612 HS Troponin T(!): 18 [MP]   1612 Troponin T Delta: -1 [MP]   1635 Chest x-ray independently interpreted by me as no pneumothorax [MP]   1805 EKG          EKG time: 1305  Rhythm/Rate: Normal sinus rhythm, rate 79  P waves and MI: Normal P waves, normal MI  QRS, axis: Normal QRS, normal axis  ST and T waves: No acute ST or T wave changes    Interpreted Contemporaneously by me, independently viewed  Unchanged compared to prior 6/6/2023   [MP]   1809 Patient presents to ED with concern for elevated blood pressure, palpitations, headache.  Stable troponin from yesterday, no acute ischemic changes on EKG.  Blood pressure well controlled without intervention in the ED today.  Referral placed to cardiology for close follow-up, also encourage patient to follow-up closely with PCP.  She was prescribed clonidine yesterday to use for significant hypertension in addition to home lisinopril.  Discussed ED return precautions with the patient.  She is otherwise well-appearing, hemodynamically stable, and therefore appropriate for discharge. [MP]      ED Course User Index  [MP] Jeana Reed PA-C             Patient was wearing a face mask  when I entered the room and they continued to wear a mask throughout their stay in the ED.  I wore PPE, including  gloves, face mask with shield or face mask with goggles whenever I was in the room with patient.     DIAGNOSIS  Final diagnoses:   Palpitations   Elevated troponin   Uncontrolled hypertension           Follow Up:  Aarti Ogden, APRN  [address]  [address]  [place] [id number]  [phone]    Schedule an appointment as soon as possible for a visit   For follow-up of your blood pressure, headache, palpitations      RX:     Medication List        Changed      meloxicam 15 MG tablet  Commonly known as: Mobic  Take 1 tablet by mouth Daily.  What changed: additional instructions              Latest Documented Vital Signs:  As of 18:10 EDT  BP- 149/77 HR- 68 Temp- 97.7 °F (36.5 °C) O2 sat- 98%              --    Please note that portions of this were completed with a voice recognition program.       Note Disclaimer: At [facility], we believe that sharing information builds trust and better relationships. You are receiving this note because you are receiving care at [facility] or recently visited. It is possible you will see health information before a provider has talked with you about it. This kind of information can be easy to misunderstand. To help you fully understand what it means for your health, we urge you to discuss this note with your provider.             Jeana Reed PA-C  06/07/23 [id number]

## 2023-06-08 ENCOUNTER — OFFICE VISIT (OUTPATIENT)
Dept: FAMILY MEDICINE CLINIC | Facility: CLINIC | Age: 63
End: 2023-06-08
Payer: MEDICARE

## 2023-06-08 VITALS
BODY MASS INDEX: 37.21 KG/M2 | WEIGHT: 210 LBS | OXYGEN SATURATION: 98 % | HEIGHT: 63 IN | TEMPERATURE: 97.5 F | HEART RATE: 82 BPM | SYSTOLIC BLOOD PRESSURE: 134 MMHG | DIASTOLIC BLOOD PRESSURE: 74 MMHG

## 2023-06-08 DIAGNOSIS — I10 BENIGN ESSENTIAL HYPERTENSION: Primary | ICD-10-CM

## 2023-06-08 DIAGNOSIS — R00.2 HEART PALPITATIONS: ICD-10-CM

## 2023-06-08 PROCEDURE — 99214 OFFICE O/P EST MOD 30 MIN: CPT | Performed by: NURSE PRACTITIONER

## 2023-06-08 PROCEDURE — 3044F HG A1C LEVEL LT 7.0%: CPT | Performed by: NURSE PRACTITIONER

## 2023-06-08 PROCEDURE — 3078F DIAST BP <80 MM HG: CPT | Performed by: NURSE PRACTITIONER

## 2023-06-08 PROCEDURE — 3075F SYST BP GE 130 - 139MM HG: CPT | Performed by: NURSE PRACTITIONER

## 2023-06-08 RX ORDER — AMLODIPINE BESYLATE 5 MG/1
5 TABLET ORAL NIGHTLY
Qty: 30 TABLET | Refills: 0 | Status: SHIPPED | OUTPATIENT
Start: 2023-06-08 | End: 2023-06-08 | Stop reason: SDUPTHER

## 2023-06-08 RX ORDER — AMLODIPINE BESYLATE 5 MG/1
5 TABLET ORAL NIGHTLY
Qty: 30 TABLET | Refills: 0 | Status: SHIPPED | OUTPATIENT
Start: 2023-06-08

## 2023-06-08 RX ORDER — ESTRADIOL 0.05 MG/D
1 PATCH, EXTENDED RELEASE TRANSDERMAL 2 TIMES WEEKLY
COMMUNITY

## 2023-06-08 RX ORDER — OLMESARTAN MEDOXOMIL 40 MG/1
40 TABLET ORAL DAILY
Qty: 30 TABLET | Refills: 0 | Status: SHIPPED | OUTPATIENT
Start: 2023-06-08

## 2023-06-08 RX ORDER — OLMESARTAN MEDOXOMIL 40 MG/1
40 TABLET ORAL DAILY
Qty: 30 TABLET | Refills: 0 | Status: SHIPPED | OUTPATIENT
Start: 2023-06-08 | End: 2023-06-08 | Stop reason: SDUPTHER

## 2023-06-08 NOTE — PATIENT INSTRUCTIONS
Start amlodipine at bedtime today  Continue current lisinopril-after 3 days on amlodipine then stop lisinopril and start olmesartan (Monday)

## 2023-06-08 NOTE — PROGRESS NOTES
"Chief Complaint  Follow-up (ER f/u for htn, rapid heart rate, has elevated labs )    Subjective        Alisha Connolly presents to [facility]  History of Present Illness  pt here for ER follow up for HTN, elevated HR.    Went to GYN Monday am  [facility] does not pay for fem rings anymore  Saw Dr Major and was prescribed patch which she started.   BP was 158/80s  BP Monday night /85-checked due to bad HA all day Sunday and Monday which is new for her.     Tues am BP was 165/88  Going to PT-BP was 160/85  Tues night was 180/85-went to ER.   In ER found to have elevated BP with PVC.   Had labs done-troponin was elevated and told to see cardiology.  Does not have cardiologist.     Went to ER  again yesterday for same.   Week prior was having heart palpitations off and on all week.     Feels like she is having extrasystoles and feels similar to scared feeling when these sx begin.  Just does not feel right.     Did not start using patch until Monday night    Palpitations now more frequently and about every hr    Has not taken clonidine for high BP since BP not been above 180    This am BP was 167/86-at 0917 takes lisinopril around same time she checked BP.   HR has been 70-80.      Has been taking BB daily since she is not tolerant of statin for HLD by previous provider for PP.     Has not taken clonidine given by ER.     Stopped mobic. Very concerned that this will cause delay in upcoming knee surgery.     Has FH CVD.  She has not scheduled with sleep medicine for eval.      Objective   Vital Signs:  /74   Pulse 82   Temp 97.5 °F (36.4 °C)   Ht 160 cm (63\")   Wt 95.3 kg (210 lb)   SpO2 98%   BMI 37.20 kg/m²   Estimated body mass index is 37.2 kg/m² as calculated from the following:    Height as of this encounter: 160 cm (63\").    Weight as of this encounter: 95.3 kg (210 lb).               Physical Exam  Vitals and nursing note reviewed.   Constitutional:       General: She is not " in acute distress.     Appearance: She is well-developed. She is not ill-appearing or diaphoretic.   HENT:      Head: Normocephalic and atraumatic.   Eyes:      General:         Right eye: No discharge.         Left eye: No discharge.      Conjunctiva/sclera: Conjunctivae normal.   Cardiovascular:      Rate and Rhythm: Normal rate and regular rhythm.      Comments: Few PVC noted  Pulmonary:      Effort: Pulmonary effort is normal.      Breath sounds: Normal breath sounds.   Abdominal:      General: Bowel sounds are normal.      Palpations: Abdomen is soft.      Tenderness: There is no abdominal tenderness.   Musculoskeletal:         General: No deformity.      Comments: Antalgia with ambulation  Left knee enlarged   Skin:     General: Skin is warm and dry.   Neurological:      General: No focal deficit present.      Mental Status: She is alert and oriented to person, place, and time.   Psychiatric:         Mood and Affect: Affect normal. Mood is anxious.         Speech: Speech normal.         Behavior: Behavior normal. Behavior is cooperative.         Thought Content: Thought content normal.         Cognition and Memory: Cognition normal.      Comments: Normal anxiety given recent ER visits      Result Review :                   Assessment and Plan   Diagnoses and all orders for this visit:    1. Benign essential hypertension (Primary)  -     olmesartan (BENICAR) 40 MG tablet; Take 1 tablet by mouth Daily.  Dispense: 30 tablet; Refill: 0  -     amLODIPine (NORVASC) 5 MG tablet; Take 1 tablet by mouth Every Night.  Dispense: 30 tablet; Refill: 0    2. Heart palpitations  -     Cancel: TSH  -     TSH Rfx On Abnormal To Free T4    Other orders  -     Discontinue: olmesartan (BENICAR) 40 MG tablet; Take 1 tablet by mouth Daily.  Dispense: 30 tablet; Refill: 0  -     Discontinue: amLODIPine (NORVASC) 5 MG tablet; Take 1 tablet by mouth Every Night.  Dispense: 30 tablet; Refill: 0    Pt had w/u in ER and other than  troponin elevation that was stable and trending down on most recent was benign. Has appt with cardiology upcoming.  BP does not seem well controlled-lisinopril stopped and will replace with olmesartan for better control. Amlodipine added.  Olmesartan in am and amlodpine at HS.  Will check TSH to make sure not causing palpitations.  S/S that require emergent eval discussed with patient.     She will see cardiology as planned.      Reviewed and discussed ER notes x 2, labs, radiology-cardiomegaly, elevated troponins.   BP is pretty good today.  Recommend bringing cuff to cardiology to make sure reading appropriately.           Follow Up   Return if symptoms worsen or fail to improve.  Patient was given instructions and counseling regarding her condition or for health maintenance advice. Please see specific information pulled into the AVS if appropriate.

## 2023-06-09 LAB — TSH SERPL DL<=0.005 MIU/L-ACNC: 2.1 UIU/ML (ref 0.27–4.2)

## 2023-06-12 ENCOUNTER — TELEPHONE (OUTPATIENT)
Dept: PHYSICAL THERAPY | Facility: CLINIC | Age: 63
End: 2023-06-12

## 2023-06-13 ENCOUNTER — OFFICE VISIT (OUTPATIENT)
Dept: ORTHOPEDIC SURGERY | Facility: CLINIC | Age: 63
End: 2023-06-13
Payer: MEDICARE

## 2023-06-13 VITALS — TEMPERATURE: 97.6 F | BODY MASS INDEX: 37.21 KG/M2 | HEIGHT: 63 IN | WEIGHT: 210 LBS

## 2023-06-13 DIAGNOSIS — M17.12 ARTHRITIS OF KNEE, LEFT: Primary | ICD-10-CM

## 2023-06-13 NOTE — PROGRESS NOTES
"   History & Physical       Patient: Alisha Connolly  YOB: 1960  Medical Record Number: [id number]  Wt Readings from Last 3 Encounters:   06/13/23 95.3 kg (210 lb)   06/08/23 95.3 kg (210 lb)   06/07/23 96.2 kg (212 lb)     Ht Readings from Last 3 Encounters:   06/13/23 160 cm (63\")   06/08/23 160 cm (63\")   06/07/23 160 cm (63\")     Body mass index is 37.2 kg/m².  Facility age limit for growth percentiles is 20 years.    Surgeon:  Dr. Tomás Rasmussen    Chief Complaints:   Chief Complaint   Patient presents with    Left Knee - Follow-up, Pain     Surgery:  Left Total Knee Arthroplasty with Yaazn Navigation    Subjective:    History of Present Illness: 63 y.o. female presents with   Chief Complaint   Patient presents with    Left Knee - Follow-up, Pain   . Chronic symptoms have been progressively worsening despite more conservative treatment measures including medications OTC and prescription, cortisone injections and physical therapy.  Symptoms are associated with ability to move, exercise, and perform activities of daily living.  Symptoms are aggravated by weight bearing and ROM necessary for activities of daily living.   Symptoms improve with rest, ice and elevation only minimally.      Allergies:   Allergies   Allergen Reactions    Penicillins Hives    Atorvastatin Paresthesia     Arm numbness            Rosuvastatin Paresthesia     Arm numbness      Latex Rash    Tetanus Toxoid, Adsorbed Rash       Medications:   Home Medications:  Current Outpatient Medications on File Prior to Visit   Medication Sig    acetaminophen (TYLENOL) 500 MG tablet Take 1 tablet by mouth Every 6 (Six) Hours As Needed for Mild Pain.    albuterol sulfate  (90 Base) MCG/ACT inhaler Inhale 2 puffs Every 4 (Four) Hours As Needed.    amLODIPine (NORVASC) 5 MG tablet Take 1 tablet by mouth Every Night.    aspirin 81 MG EC tablet Take 1 tablet by mouth Daily. TO HOLD 1 WEEK BEFORE SURGERY    Chlorhexidine Gluconate 2 % " pads Apply  topically. As directed pre op    cholecalciferol (VITAMIN D3) 25 MCG (1000 UT) tablet Take 1 tablet by mouth Daily.    CINNAMON PO Take 1 tablet by mouth Daily. TO HOLD 1 WEEK BEFORE SURGERY    cloNIDine (CATAPRES) 0.1 MG tablet Take 1 tablet by mouth 2 (Two) Times a Day As Needed for High Blood Pressure (Systolic BP > 180, Diastolic BP > 110).    estradiol (MINIVELLE, VIVELLE-DOT) 0.05 MG/24HR patch Place 1 patch on the skin as directed by provider 2 (Two) Times a Week.    Lancets (OneTouch Delica Plus Judhlw08M) misc 1 each by Other route Daily.    meloxicam (Mobic) 15 MG tablet Take 1 tablet by mouth Daily. (Patient taking differently: Take 1 tablet by mouth Daily. TO HOLD 1 WEEK BEFORE SURGERY)    metFORMIN (GLUCOPHAGE) 500 MG tablet Take 1 tablet by mouth 2 (Two) Times a Day With Meals.    olmesartan (BENICAR) 40 MG tablet Take 1 tablet by mouth Daily.    OneTouch Ultra test strip 1 each by Other route Daily.    vitamin B-12 (CYANOCOBALAMIN) 1000 MCG tablet Take 1 tablet by mouth Daily.     No current facility-administered medications on file prior to visit.     Current Medications:  Scheduled Meds:  Continuous Infusions:No current facility-administered medications for this visit.    PRN Meds:.    I have reviewed the patient's medical history in detail and updated the computerized patient record.  Review and summarization of old records include:    Past Medical History:   Diagnosis Date    Abnormal Pap smear of cervix     Age-related nuclear cataract of both eyes 02/03/2020    Anal fissure 10/2021    Anal skin tag 08/2022    S/P EXCISION    Ankle fracture 12/2022    RIGHT    Arthrodesis status 03/08/2014    Asthma     Benign essential hypertension 06/05/2015    Continue lisinopril    Carpal tunnel syndrome     PING    Cervical spondylolysis     Chronic neck pain 07/19/2016    Closed nondisplaced fracture of lateral malleolus of right fibula 12/10/2022    NO SURGERY, SEEN AT [facility]    Cystitis 11/2017     Dry eye syndrome 2020    Environmental allergies     Exogenous hypertriglyceridemia 2017    Ganglion cyst of finger 2020    Hemorrhoid     Hemorrhoids     History of COVID-19 2022    History of torn meniscus of left knee     Hormone replacement therapy (HRT)     Hyperchylomicronemia     Hyperlipidemia 2015    ASCVD 10-year risk ~10% Plan: · Start statin. Unclear whether arm numbness really 2/2 statin in the past.    Hyperopia of both eyes 2020    Keratoconjunctivitis sicca 2020    Knee pain, left     Lichen sclerosus     Lung nodule 2006    Obstructive sleep apnea syndrome 2004    Not using CPAP due to eye issues    Palpitations     Pertussis 2016    [facility]    PNA (pneumonia)     PONV (postoperative nausea and vomiting)     Presbyopia 2020    Rotator cuff injury 2011    SEEN AT [facility]    Seasonal allergies     Slow to wake up after anesthesia     Snoring     Type 2 diabetes mellitus without complication 2015    Under good control Plan: · Reassured about use of metformin    Urinary urgency 2021        Past Surgical History:   Procedure Laterality Date    ANTERIOR CERVICAL DISCECTOMY W/ FUSION N/A 2014    C6-7, DR. ALEXEY IZAGUIRRE AT [facility]. TITANIUM PLATE    ANUS SURGERY N/A 2022    Procedure: ANAL TAG EXCISION;  Surgeon: Bradley Santiago MD;  Location: [facility] ENDOSCOPY;  Service: General;  Laterality: N/A;  pre-anal tag  post-same    CARPAL TUNNEL RELEASE Bilateral      SECTION      COLONOSCOPY N/A     COLONOSCOPY N/A 2022    EXTERNAL AND INTERNAL HEMORRHOIDS, GRADE 3-BANDED, RESCOPE IN 10 YRS, DR. BRADLEY SANTIAGO AT [facility]    HEMORRHOIDECTOMY N/A 2022    Procedure: HEMORRHOID BANDING x3;  Surgeon: Bradley Santiago MD;  Location: [facility] ENDOSCOPY;  Service: General;  Laterality: N/A;  pre-hemorrhoids  post-same    HYSTERECTOMY N/A     WITH BSO, FOR HEAVY BLEEDING    INCISIONAL BREAST BIOPSY Right      BENIGN    LAPAROSCOPIC LYSIS OF ADHESIONS N/A     OOPHORECTOMY      OOPHORECTOMY      SPHINCTEROTOMY N/A 2023    Procedure: EXAM UNDER ANESTHESIA, CHEMICAL SPHINCTEROTOMY WITH BOTOX;  Surgeon: Sarai Perez MD;  Location: [facility] OR;  Service: General;  Laterality: N/A;        Social History     Occupational History    Not on file   Tobacco Use    Smoking status: Former     Packs/day: 0.25     Years: 4.50     Pack years: 1.13     Types: Cigarettes     Quit date: 3/7/2014     Years since quittin.2     Passive exposure: Past    Smokeless tobacco: Never   Vaping Use    Vaping Use: Never used   Substance and Sexual Activity    Alcohol use: Yes     Comment: occ    Drug use: Never    Sexual activity: Defer     Partners: Male     Birth control/protection: Post-menopausal, Hysterectomy      Social History     Social History Narrative    Not on file        Family History   Problem Relation Age of Onset    Arthritis Mother     Heart disease Mother     Arrhythmia Mother     Hyperlipidemia Father     Diabetes Father     Heart disease Father     Coronary artery disease Father     Diabetes Sister     Diabetes Paternal Grandmother     Cancer Cousin     Breast cancer Cousin     Cancer Maternal Great-Grandmother     Ovarian cancer Maternal Great-Grandmother     Malig Hyperthermia Neg Hx        ROS: 14 point review of systems was performed and was negative except for documented findings in HPI and today's encounter.       Constitutional:  Denies fever, shaking or chills   Eyes:  Denies change in visual acuity   HENT:  Denies nasal congestion or sore throat   Respiratory:  Denies cough or shortness of breath   Cardiovascular:  Denies chest pain or severe LE edema   GI:  Denies abdominal pain, nausea, vomiting, bloody stools or diarrhea   Musculoskeletal:  Denies numbness tingling or loss of motor function except as outlined above in history of present illness.  : Denies painful urination or  "hematuria  Integument:  Denies rash, lesion or ulceration   Neurologic:  Denies headache or focal weakness  Endocrine:  Denies lymphadenopathy  Psych:  Denies confusion or change in mental status   Hem:  Denies active bleeding    Physical Exam: 63 y.o. female  Wt Readings from Last 3 Encounters:   06/13/23 95.3 kg (210 lb)   06/08/23 95.3 kg (210 lb)   06/07/23 96.2 kg (212 lb)     Ht Readings from Last 3 Encounters:   06/13/23 160 cm (63\")   06/08/23 160 cm (63\")   06/07/23 160 cm (63\")     Body mass index is 37.2 kg/m².  Facility age limit for growth percentiles is 20 years.  Vitals:    06/13/23 0943   Temp: 97.6 °F (36.4 °C)       Vital signs reviewed.   General Appearance:    Alert, cooperative, in no acute distress                  Eyes: conjunctiva clear  ENT: external ears and nose atraumatic  CV: no peripheral edema  Resp: normal respiratory effort  Skin: no rashes or wounds; normal turgor  Psych: mood and affect appropriate  Lymph: no nodes appreciated  Neuro: gross sensation intact  Vascular:  Palpable peripheral pulse in noted extremity  Musculoskeletal Extremities: KNEE Exam: medial joint line tenderness with crepitation, synovitis, swelling, and joint effusion left knee.        Diagnostic Tests:  Lab Results   Component Value Date    WBC 6.98 06/07/2023    HGB 13.0 06/07/2023    HCT 39.1 06/07/2023    MCV 90.1 06/07/2023     06/07/2023     Lab Results   Component Value Date    GLUCOSE 120 (H) 06/07/2023    CALCIUM 9.5 06/07/2023     06/07/2023    K 4.4 06/07/2023    CO2 25.0 06/07/2023     06/07/2023    BUN 16 06/07/2023    CREATININE 0.89 06/07/2023    EGFRRESULT 80.0 11/22/2022    EGFR 72.1 06/07/2023    BCR 18.0 06/07/2023    ANIONGAP 10.0 06/07/2023       EKG:    HEART RATE= 74  bpm  RR Interval= 811  ms  MT Interval= 152  ms  P Horizontal Axis= -6  deg  P Front Axis= 46  deg  QRSD Interval= 88  ms  QT Interval= 368  ms  QRS Axis= 8  deg  T Wave Axis= -6  deg  - BORDERLINE ECG " -  Sinus rhythm  Probable left atrial enlargement  Low voltage, precordial leads  Nonspecific  T abnormalities, anterior leads  No change from previous tracing  Electronically Signed By: Enrique Ventura ([facility]) 07-Jun-2023 13:29:45  Date and Time of Study: 2023-06-07 13:05:55        I have reviewed all the lab & EKG results.       Radiology:  Imaging was done previously in the office, viewed images and discussed with the patient:        Assessment:  Patient Active Problem List    Diagnosis Date Noted    Arthritis of left knee 04/26/2023    Anal fissure 01/16/2023     Note Last Updated: 1/16/2023     Added automatically from request for surgery [id number]      Closed nondisplaced fracture of lateral malleolus of right fibula 12/14/2022    Anal skin tag 08/18/2022     Note Last Updated: 8/18/2022     Added automatically from request for surgery [id number]      Encounter for screening colonoscopy 08/18/2022     Note Last Updated: 8/18/2022     Added automatically from request for surgery [id number]      Hemorrhoids 08/18/2022     Note Last Updated: 8/18/2022     Added automatically from request for surgery [id number]      Age-related nuclear cataract of both eyes 02/03/2020    Dry eye syndrome 02/03/2020    Hyperopia of both eyes 02/03/2020    Keratoconjunctivitis sicca 02/03/2020    Presbyopia 02/03/2020    Exogenous hypertriglyceridemia 06/05/2017    Chronic neck pain 07/19/2016    Type 2 diabetes mellitus without complication 12/21/2015     Note Last Updated: 8/15/2022     Last Assessment & Plan:   Formatting of this note might be different from the original.  Assessment:  · Under good control  Plan:  · Reassured about use of metformin  Formatting of this note might be different from the original.  Last Assessment & Plan:   Formatting of this note might be different from the original.  Assessment:  · Under good control  Plan:  · Reassured about use of metformin      Benign essential hypertension 06/05/2015     Note Last  Updated: 8/15/2022     Last Assessment & Plan:   Formatting of this note might be different from the original.  Assessment:  · BP controlled  Plan:  · Continue lisinopril  Formatting of this note might be different from the original.  Last Assessment & Plan:   Formatting of this note might be different from the original.  Assessment:  · BP controlled  Plan:  · Continue lisinopril      Hyperlipidemia 06/05/2015     Note Last Updated: 8/15/2022     Last Assessment & Plan:   Formatting of this note might be different from the original.  Assessment:  · ASCVD 10-year risk ~10%  Plan:  · Start statin. Unclear whether arm numbness really 2/2 statin in the past.  Formatting of this note might be different from the original.  Last Assessment & Plan:   Formatting of this note might be different from the original.  Assessment:  · ASCVD 10-year risk ~10%  Plan:  · Start statin. Unclear whether arm numbness really 2/2 statin in the past.      Arthrodesis status 03/08/2014    S/P hysterectomy with oophorectomy 11/22/2011    Hypertension 11/22/2011    Lung nodule 06/01/2006    History of solitary pulmonary nodule 06/01/2006     Note Last Updated: 8/15/2022     Formatting of this note might be different from the original.  Last Assessment & Plan:   Formatting of this note might be different from the original.  Assessment:  · Seems to be historical dx, with no evidence on more recent CXR's  Plan:  · No need for further f/u    Last Assessment & Plan:   Formatting of this note might be different from the original.  Assessment:  · Seems to be historical dx, with no evidence on more recent CXR's  Plan:  · No need for further f/u         Plan:  Dr. Tomás Rasmussen reviewed anatomy of a total joint arthroplasty in laymen's terms, as well as typical postoperative recovery and possibly 6-12 months for maximal recovery, and possible need for rehabilitation stay after hospitalization. We also discussed risks, benefits, alternatives, and  limitations of procedure with risks including but not limited to neurovascular damage, bleeding, infection, malalignment, chronic pian, failure of implants, osteolysis, loosening of implants, loss of motion, weakness, stiffness, instability, DVT, pulmonary embolus, death, stroke, complex regional pain syndrome, myocardial infarction, and need for additional procedures. Concept of substitution vs. replacement discussed.  No guarantees were given regarding results of surgery.      Alisha Connolly was given the opportunity to ask and have all questions answered today.  The patient voiced understanding of the risks, benefits, and alternative forms of treatment that were discussed and the patient consents to proceed with surgery.       Cleared by: seeing cardiology 6/14/2023 forplapitations and high BP - will wait for official clearance    Skin breakdown? WNL  Metal allergy? No  COVID Status:Vaccinated with Booster and History of Positive COVID test - symptomatic  DVT Risk Factors: no significant increased risk factors    DVT Prophylaxis:  Aspirin  Walker: will need one ordered  Consults: none  Additional orders: none  Pharmacy: meds to beds    Discharge Plan: POD 1 to home, home health, and when cleared by physical therapy as safe for discharge    To be updated    Date: 6/13/2023  KANWAL Lindsay    Dictated Utilizing Dragon Dictation

## 2023-06-14 ENCOUNTER — TREATMENT (OUTPATIENT)
Dept: PHYSICAL THERAPY | Facility: CLINIC | Age: 63
End: 2023-06-14
Payer: MEDICARE

## 2023-06-14 DIAGNOSIS — M17.12 ARTHRITIS OF LEFT KNEE: Primary | ICD-10-CM

## 2023-06-14 PROCEDURE — 97530 THERAPEUTIC ACTIVITIES: CPT | Performed by: PHYSICAL THERAPIST

## 2023-06-14 PROCEDURE — 97110 THERAPEUTIC EXERCISES: CPT | Performed by: PHYSICAL THERAPIST

## 2023-06-14 NOTE — PROGRESS NOTES
Physical Therapy Daily Note    Patient Name: Alisha Connolly         :  1960  Referring Physician: Tomás Rasmussen MD  Treatment Date: 2023  Date of Initial Visit: Type: THERAPY  Noted: 3/15/2023    Visit Diagnoses:    ICD-10-CM ICD-9-CM   1. Arthritis of left knee  M17.12 716.96       _____________________________________________________    Subjective   Alisha Connolly reports: High BP and HA - at PT - 181/90 that night - Went to [facility] - Kept til 3am - 4 aspirin - 203/100+ when arrived - Given BP med - EKG, etc - sent home and told to call cardiologist - Having palpatations -  Saw Cardiologist today - Stress test ordered - Sonigam of heart and stress test -   Today /80 - goal 120/70  Blood work  VALENTIN - Cardiologist - abnormal EKG - Cause? - nopain meds, went on tour, med changes at gyno,   All day w.o meds, new hormones? -   Given new BP med by PC -     Objective   (L) Knee diffusely swollen -     EXERCISES:   SUPINE ISOMETRIC LEG PRESS w/ BALL 20/5 sec ea (B) / (L)  SUPINE HS CURLS w/ LEGS on BALL x 20 / 3 sec hold w/ core emphasis  BRIDGES w/ LEGS on BALL 20/5 sec ea  SIT / STAND x 20 (bed at 20 in off floor)   HELDER LEG PRESS; (B) / (L) x 25 ea 105#   HELDER HIP ABD 45# x 25 ea  HELDER HIP ADD 65# x 30 ea   STEP UP FWD Hole 3 x 15 ea  DISCONTINUE DUE TO PAIN / FEELING OF GIVING WAY  LAT STEP UP w/ ABD Hole 2-3 x 15 ea  DISCONTINUE DUE TO PAIN / FEELING OF GIVING WAY  SIDE STEP 30 ft ea  MONSTER WALK (retro) 30 ft x 2     OCTANE: Seat 3; Tilt 3; L2   Fwd/Retro  6 min (3/3)     Functional / Therapeutic Activities:    TAPING / BRACING: NA  SEE EXERCISE FLOW SHEET -      Assessment/Plan63 yo female: (R) Ankle fx 2022;  (L) knee pain - To have (L) TKA 2023  Pain calmed down in (L) knee -   HBP episode - to ER x 2 one day - Abnormal EKG?        Progress strengthening /stabilization /functional activity / HEP that does not increase her pain -  Continue until TKA 2023 for pre-hab   To have  Stress test tomorrow and other cardiac tests to be cleared for surgery -   ___________________________________________________  Manual Therapy:                mins  92351;  Therapeutic Exercise:   28  mins  16507;     Neuromuscular Ivan:        mins  93290;    Therapeutic Activity:      10      mins  78713;     Ultrasound:                          mins  04363;  Iontophoresis:                     mins  24140;    Electrical Stimulation:         mins  70264 ( );  Mechanical Traction:          mins  48033  Dry Needling                       mins self-pay     Timed Treatment:   38    mins                  Total Treatment:     38   mins    Evelio Dumont PT  Physical Therapist  Lic # [id number]

## 2023-06-16 ENCOUNTER — HOSPITAL ENCOUNTER (OUTPATIENT)
Dept: CARDIOLOGY | Facility: HOSPITAL | Age: 63
Discharge: HOME | End: 2023-06-16
Payer: MEDICARE

## 2023-06-16 VITALS
SYSTOLIC BLOOD PRESSURE: 140 MMHG | DIASTOLIC BLOOD PRESSURE: 70 MMHG | HEIGHT: 63 IN | BODY MASS INDEX: 36.86 KG/M2 | OXYGEN SATURATION: 97 % | WEIGHT: 208 LBS | HEART RATE: 82 BPM

## 2023-06-16 DIAGNOSIS — R00.2 PALPITATIONS: ICD-10-CM

## 2023-06-16 DIAGNOSIS — R94.31 ABNORMAL ELECTROCARDIOGRAM (ECG) (EKG): ICD-10-CM

## 2023-06-16 DIAGNOSIS — Z01.818 PREOPERATIVE CLEARANCE: ICD-10-CM

## 2023-06-16 LAB
AORTIC ARCH: 2.3 CM
ASCENDING AORTA: 2.8 CM
BH CV ECHO MEAS - ACS: 1.55 CM
BH CV ECHO MEAS - AO MAX PG: 13.7 MMHG
BH CV ECHO MEAS - AO MEAN PG: 7 MMHG
BH CV ECHO MEAS - AO ROOT DIAM: 2.6 CM
BH CV ECHO MEAS - AO V2 MAX: 185 CM/SEC
BH CV ECHO MEAS - AO V2 VTI: 36.1 CM
BH CV ECHO MEAS - AVA(I,D): 1.76 CM2
BH CV ECHO MEAS - EDV(CUBED): 91.1 ML
BH CV ECHO MEAS - EDV(MOD-SP2): 75 ML
BH CV ECHO MEAS - EDV(MOD-SP4): 69 ML
BH CV ECHO MEAS - EF(MOD-BP): 61.5 %
BH CV ECHO MEAS - EF(MOD-SP2): 60 %
BH CV ECHO MEAS - EF(MOD-SP4): 63.8 %
BH CV ECHO MEAS - ESV(CUBED): 33.3 ML
BH CV ECHO MEAS - ESV(MOD-SP2): 30 ML
BH CV ECHO MEAS - ESV(MOD-SP4): 25 ML
BH CV ECHO MEAS - FS: 28.5 %
BH CV ECHO MEAS - IVS/LVPW: 1 CM
BH CV ECHO MEAS - IVSD: 1 CM
BH CV ECHO MEAS - LAT PEAK E' VEL: 5.5 CM/SEC
BH CV ECHO MEAS - LV DIASTOLIC VOL/BSA (35-75): 35.1 CM2
BH CV ECHO MEAS - LV MASS(C)D: 153.3 GRAMS
BH CV ECHO MEAS - LV MAX PG: 7 MMHG
BH CV ECHO MEAS - LV MEAN PG: 3 MMHG
BH CV ECHO MEAS - LV SYSTOLIC VOL/BSA (12-30): 12.7 CM2
BH CV ECHO MEAS - LV V1 MAX: 132 CM/SEC
BH CV ECHO MEAS - LV V1 VTI: 24.7 CM
BH CV ECHO MEAS - LVIDD: 4.5 CM
BH CV ECHO MEAS - LVIDS: 3.2 CM
BH CV ECHO MEAS - LVOT AREA: 2.6 CM2
BH CV ECHO MEAS - LVOT DIAM: 1.81 CM
BH CV ECHO MEAS - LVPWD: 1 CM
BH CV ECHO MEAS - MED PEAK E' VEL: 5.8 CM/SEC
BH CV ECHO MEAS - MV A DUR: 0.09 SEC
BH CV ECHO MEAS - MV A MAX VEL: 114 CM/SEC
BH CV ECHO MEAS - MV DEC SLOPE: 519.3 CM/SEC2
BH CV ECHO MEAS - MV DEC TIME: 0.15 MSEC
BH CV ECHO MEAS - MV E MAX VEL: 64.9 CM/SEC
BH CV ECHO MEAS - MV E/A: 0.57
BH CV ECHO MEAS - MV MAX PG: 5.9 MMHG
BH CV ECHO MEAS - MV MEAN PG: 2.4 MMHG
BH CV ECHO MEAS - MV P1/2T: 53.7 MSEC
BH CV ECHO MEAS - MV V2 VTI: 26.7 CM
BH CV ECHO MEAS - MVA(P1/2T): 4.1 CM2
BH CV ECHO MEAS - MVA(VTI): 2.37 CM2
BH CV ECHO MEAS - PA ACC TIME: 0.09 SEC
BH CV ECHO MEAS - PA V2 MAX: 143.9 CM/SEC
BH CV ECHO MEAS - PULM A REVS DUR: 0.12 SEC
BH CV ECHO MEAS - PULM A REVS VEL: 54.8 CM/SEC
BH CV ECHO MEAS - PULM DIAS VEL: 36.8 CM/SEC
BH CV ECHO MEAS - PULM S/D: 1.56
BH CV ECHO MEAS - PULM SYS VEL: 57.4 CM/SEC
BH CV ECHO MEAS - QP/QS: 0.87
BH CV ECHO MEAS - RAP SYSTOLE: 3 MMHG
BH CV ECHO MEAS - RV MAX PG: 3.6 MMHG
BH CV ECHO MEAS - RV V1 MAX: 94.3 CM/SEC
BH CV ECHO MEAS - RV V1 VTI: 18 CM
BH CV ECHO MEAS - RVOT DIAM: 1.97 CM
BH CV ECHO MEAS - RVSP: 28.1 MMHG
BH CV ECHO MEAS - SI(MOD-SP2): 22.9 ML/M2
BH CV ECHO MEAS - SI(MOD-SP4): 22.4 ML/M2
BH CV ECHO MEAS - SUP REN AO DIAM: 1.7 CM
BH CV ECHO MEAS - SV(LVOT): 63.4 ML
BH CV ECHO MEAS - SV(MOD-SP2): 45 ML
BH CV ECHO MEAS - SV(MOD-SP4): 44 ML
BH CV ECHO MEAS - SV(RVOT): 55.2 ML
BH CV ECHO MEAS - TAPSE (>1.6): 2 CM
BH CV ECHO MEAS - TR MAX PG: 25.1 MMHG
BH CV ECHO MEAS - TR MAX VEL: 250.6 CM/SEC
BH CV ECHO MEASUREMENTS AVERAGE E/E' RATIO: 11.49
BH CV NUCLEAR PRIOR STUDY: 2
BH CV REST NUCLEAR ISOTOPE DOSE: 10.3 MCI
BH CV STRESS COMMENTS STAGE 1: NORMAL
BH CV STRESS DOSE REGADENOSON STAGE 1: 0.4
BH CV STRESS DURATION MIN STAGE 1: 0
BH CV STRESS DURATION SEC STAGE 1: 10
BH CV STRESS NUCLEAR ISOTOPE DOSE: 34.8 MCI
BH CV STRESS PROTOCOL 1: NORMAL
BH CV STRESS RECOVERY BP: NORMAL MMHG
BH CV STRESS RECOVERY HR: 88 BPM
BH CV STRESS STAGE 1: 1
BH CV VAS BP LEFT ARM: NORMAL MMHG
BH CV XLRA - RV BASE: 3.3 CM
BH CV XLRA - RV LENGTH: 7.6 CM
BH CV XLRA - RV MID: 2.8 CM
BH CV XLRA - TDI S': 14.5 CM/SEC
LEFT ATRIUM VOLUME INDEX: 14 ML/M2
LV EF NUC BP: 74 %
MAXIMAL PREDICTED HEART RATE: 155 BPM
PERCENT MAX PREDICTED HR: 80 %
SINUS: 2.44 CM
STJ: 2.19 CM
STRESS BASELINE BP: NORMAL MMHG
STRESS BASELINE HR: 84 BPM
STRESS PERCENT HR: 94 %
STRESS POST PEAK BP: NORMAL MMHG
STRESS POST PEAK HR: 124 BPM
STRESS TARGET HR: 132 BPM

## 2023-06-16 PROCEDURE — 78452 HT MUSCLE IMAGE SPECT MULT: CPT

## 2023-06-16 PROCEDURE — 93306 TTE W/DOPPLER COMPLETE: CPT

## 2023-06-16 PROCEDURE — 25010000002 REGADENOSON 0.4 MG/5ML SOLUTION: Performed by: INTERNAL MEDICINE

## 2023-06-16 PROCEDURE — 25510000001 PERFLUTREN (DEFINITY) 8.476 MG IN SODIUM CHLORIDE (PF) 0.9 % 10 ML INJECTION: Performed by: INTERNAL MEDICINE

## 2023-06-16 PROCEDURE — 0 TECHNETIUM TETROFOSMIN KIT: Performed by: INTERNAL MEDICINE

## 2023-06-16 PROCEDURE — 93306 TTE W/DOPPLER COMPLETE: CPT | Performed by: INTERNAL MEDICINE

## 2023-06-16 PROCEDURE — 93017 CV STRESS TEST TRACING ONLY: CPT

## 2023-06-16 PROCEDURE — A9502 TC99M TETROFOSMIN: HCPCS | Performed by: INTERNAL MEDICINE

## 2023-06-16 RX ORDER — REGADENOSON 0.08 MG/ML
0.4 INJECTION, SOLUTION INTRAVENOUS
Status: COMPLETED | OUTPATIENT
Start: 2023-06-16 | End: 2023-06-16

## 2023-06-16 RX ADMIN — TETROFOSMIN 1 DOSE: 1.38 INJECTION, POWDER, LYOPHILIZED, FOR SOLUTION INTRAVENOUS at 09:40

## 2023-06-16 RX ADMIN — TETROFOSMIN 1 DOSE: 1.38 INJECTION, POWDER, LYOPHILIZED, FOR SOLUTION INTRAVENOUS at 08:52

## 2023-06-16 RX ADMIN — PERFLUTREN 1.5 ML: 6.52 INJECTION, SUSPENSION INTRAVENOUS at 08:42

## 2023-06-16 RX ADMIN — REGADENOSON 0.4 MG: 0.08 INJECTION, SOLUTION INTRAVENOUS at 09:40

## 2023-06-19 ENCOUNTER — TELEPHONE (OUTPATIENT)
Dept: PHYSICAL THERAPY | Facility: CLINIC | Age: 63
End: 2023-06-19

## 2023-06-19 ENCOUNTER — TELEPHONE (OUTPATIENT)
Dept: CARDIOLOGY | Facility: CLINIC | Age: 63
End: 2023-06-19
Payer: MEDICARE

## 2023-06-19 NOTE — TELEPHONE ENCOUNTER
Sent clearance letter. Would have her continue to monitor BP 1-2 hours after medications and call if consistently >130/80. I know she said she saw the results, but can let her know that stress test was normal with no evidence of tight blockage in the heart and echo showed her heart is strong, but has a little trouble relaxing, which is why we need her BP readings after medications.

## 2023-06-19 NOTE — TELEPHONE ENCOUNTER
PATIENT IS HAVING SURGERY TOMORROW AND HOME HEALTH WILL COME TO HER HOME FOR 2 WEEKS.  SHE NEEDS TO PUSH PT BACK FOR TWO WEEKS BUT DOES NOT WANT TO CHANGE HER OTHER APPOINTMENTS.  PLEASE CALL HER AN WORK HER SCHEDULE OUT WITH HER BEFORE YOU CHANGE HER SCHEDULE.

## 2023-06-19 NOTE — TELEPHONE ENCOUNTER
Alisha Connolly called to report blood pressure readings and request clearance letter for surgery that is tomorrow.    Patient provided the following readings:  Date Time BP HR Notes   14-Jun /74 78 before meds   15-Calos 9:22 /85 73 before meds    1:42 /78 83     9:48 /83 77    16-Jun /79 88 before meds   17-Jun 8:25 /73 83 before meds   18-Jun 8:30 /75 77 before meds   19-Jun 8:54 /80 78 before meds     Patient stated she has seen her results for testing performed on 6/16.    Patient also needs clearance for left total knee replacement to be performed by Dr. Tomás Rasmussen tomorrow, 6/20.    Please let me know how you would like to proceed.    Thank you,  Rajwinder CHAMPAGNE RN  Triage Nurse ERIC   09:28 EDT

## 2023-06-19 NOTE — TELEPHONE ENCOUNTER
Left voicemail for Alisha Mohsen requesting callback.    Thank you,  Rajwinder CHAMPAGNE RN  Triage Nurse G   12:11 EDT

## 2023-07-05 ENCOUNTER — TELEPHONE (OUTPATIENT)
Dept: FAMILY MEDICINE CLINIC | Facility: CLINIC | Age: 63
End: 2023-07-05

## 2023-07-05 NOTE — TELEPHONE ENCOUNTER
Caller: Alisha Connolly    Relationship to patient: Self    Best call back number: [phone]    Patient is needing: PATIENT SATES THAT SHE GOT A MESSAGE FROM HER PHARMACY STATING THAT THE OLMESARTAN WAS DENIED AND THE AMLODIPINE WOULD NEED PROVID[facility] AP[facility]OVAL [facility]. DOTTY REQUESTING CALLBACK WITH UPDATE.

## 2023-07-19 ENCOUNTER — TREATMENT (OUTPATIENT)
Dept: PHYSICAL THERAPY | Facility: CLINIC | Age: 63
End: 2023-07-19
Payer: MEDICARE

## 2023-07-19 DIAGNOSIS — R29.898 WEAKNESS OF LEFT LOWER EXTREMITY: ICD-10-CM

## 2023-07-19 DIAGNOSIS — R26.9 GAIT DISTURBANCE: ICD-10-CM

## 2023-07-19 DIAGNOSIS — Z96.652 S/P TKR (TOTAL KNEE REPLACEMENT), LEFT: Primary | ICD-10-CM

## 2023-07-19 DIAGNOSIS — M25.662 STIFFNESS OF LEFT KNEE: ICD-10-CM

## 2023-07-19 PROCEDURE — 97140 MANUAL THERAPY 1/> REGIONS: CPT | Performed by: PHYSICAL THERAPIST

## 2023-07-19 PROCEDURE — 97110 THERAPEUTIC EXERCISES: CPT | Performed by: PHYSICAL THERAPIST

## 2023-07-19 PROCEDURE — 97530 THERAPEUTIC ACTIVITIES: CPT | Performed by: PHYSICAL THERAPIST

## 2023-07-24 ENCOUNTER — TREATMENT (OUTPATIENT)
Dept: PHYSICAL THERAPY | Facility: CLINIC | Age: 63
End: 2023-07-24
Payer: MEDICARE

## 2023-07-24 DIAGNOSIS — Z96.652 S/P TKR (TOTAL KNEE REPLACEMENT), LEFT: Primary | ICD-10-CM

## 2023-07-24 DIAGNOSIS — R29.898 WEAKNESS OF LEFT LOWER EXTREMITY: ICD-10-CM

## 2023-07-24 DIAGNOSIS — R26.9 GAIT DISTURBANCE: ICD-10-CM

## 2023-07-24 DIAGNOSIS — M25.662 STIFFNESS OF LEFT KNEE: ICD-10-CM

## 2023-07-24 PROCEDURE — 97530 THERAPEUTIC ACTIVITIES: CPT | Performed by: PHYSICAL THERAPIST

## 2023-07-24 PROCEDURE — 97140 MANUAL THERAPY 1/> REGIONS: CPT | Performed by: PHYSICAL THERAPIST

## 2023-07-24 PROCEDURE — 97110 THERAPEUTIC EXERCISES: CPT | Performed by: PHYSICAL THERAPIST

## 2023-07-24 NOTE — PROGRESS NOTES
Physical Therapy Daily Note    Patient Name: Alisha Connolly         :  1960  Referring Physician: Hilda Mello APRN  Treatment Date: 2023  Date of Initial Visit: No linked episodes    Visit Diagnoses:    ICD-10-CM ICD-9-CM   1. S/P TKR (total knee replacement), left  Z96.652 V43.65   2. Stiffness of left knee  M25.662 719.56   3. Gait disturbance  R26.9 781.2   4. Weakness of left lower extremity  R29.898 729.89       _____________________________________________________    Subjective   Alisha Connolly reports: Redness cleared up at proximal scar -     Objective   Prox scar healing well - no redness surrounding area - grossly decreased swelling -     MANUAL THERAPY:   STM/DTM (L) Lower leg, posterior knee into HSs  PATELLAR MOBS: Gd ll-lll Sup/Inf, Med/Lat (L) knee  GENTLE ASSISTED ROM (L) KNEE FLEXION / EXTENSION w/ PROLONGED HOLDS in SITTING due to improved comfort -      EXERCISES:   HEEL SLIDES w/ GAIT BELT ASSIST:  10/15 sec holds each flex/ext -   SUPINE KNEE FLEX/EXTENSION w/ LEGS on BALL w/ OP x 15  90/90 KNEE FLEXION STRETCH 5/30 sec ea  SUPINE TKE, x 20 - defer today  SEATED KNEE FLEXON / EXTENSION: 10 sec Holds x 15 ea  SEATED FAQ x 15 w/ PH for ext  NUSTEP  x 6 min L5  seat 9 Arms 10  REC BIKE -(Oscillations fwd/retro) full rev as vu - Pt able to perform full rev retro by end -      Functional / Therapeutic Activities:    TAPING / BRACING: K-tape to Unload ant/med knee x 2 strips - (R)  Discussed working on her peddle device at home more often - fwd/retro -         Assessment/Plan  62 yo female; S/P (L) TKA - 2023 -    Taping helpful in reducing pain, but pain ant/med knee limiting knee extension ROM -        Progress strengthening /stabilization /functional activity, MT, Taping prn -        _________________________________________________    Manual Therapy:            15     mins  81105;  Therapeutic Exercise:    20    mins  13156;     Neuromuscular Ivan:        mins  76101;     Therapeutic Activity:     05      mins  67188;     Ultrasound:                          mins  94966;  Iontophoresis:                     mins  84825;    Electrical Stimulation:         mins  49180 ( );  Mechanical Traction:          mins  61068  Dry Needling                       mins self-pay     Timed Treatment:   40    mins                  Total Treatment:     40    mins        Evelio Dumont PT  Physical Therapist  Lic # [id number]

## 2023-07-24 NOTE — PROGRESS NOTES
Physical Therapy Daily Note    Patient Name: Alisha Connolly         :  1960  Referring Physician: Hilda Mello APRN  Treatment Date: 2023  Date of Initial Visit: Type: THERAPY  Noted: 7/3/2023    Visit Diagnoses:    ICD-10-CM ICD-9-CM   1. S/P TKR (total knee replacement), left  Z96.652 V43.65   2. Stiffness of left knee  M25.662 719.56   3. Gait disturbance  R26.9 781.2   4. Weakness of left lower extremity  R29.898 729.89       _____________________________________________________    Subjective   Alisha Connolly reports: Tape helpful in reducing sig pain w/ ROM -     Objective   Redness about size of a nickel medial to superior scar (L) -   Very tender medial, ant/med knee (L)    MANUAL THERAPY:   STM/DTM (L) Lower leg, posterior knee into HSs  PATELLAR MOBS: Gd ll-lll Sup/Inf, Med/Lat (L) knee  GENTLE ASSISTED ROM (L) KNEE FLEXION / EXTENSION w/ PROLONGED HOLDS in SUPINE and SEATED -   Dressed superior scar w/ ENMA and band-aid Pt brought with hier -      EXERCISES:   HEEL SLIDES w/ GAIT BELT ASSIST:  10/15 sec holds each flex/ext -   SUPINE KNEE FLEX/EXTENSION w/ LEGS on BALL w/ OP x 15  90/90 KNEE FLEXION STRETCH 5/30 sec ea  SUPINE TKE, x 20  SEATED KNEE FLEXON / EXTENSION: 10 sec Holds x 15 ea  SEATED FAQ x 15 w/ PH for ext  NUSTEP  x 6 min L5  REC BIKE -add     Functional / Therapeutic Activities:    TAPING / BRACING: K-tape to Unload ant/med knee x 2 strips - (R)  Knee assessment        Assessment/Plan  62 yo female; S/P (L) TKA - 2023 -    Severe pain probable PF jt limiting knee flexion -  Taping  helpful  in reducing pain and allowing improved knee flexion w/ less pain    redness medial to superior scar -     Progress strengthening /stabilization /functional activity - Pt to monitor redness near superior scar       _________________________________________________    Manual Therapy:            20     mins  44250;  Therapeutic Exercise:    15    mins  02935;     Neuromuscular Ivan:         mins  68175;    Therapeutic Activity:     06      mins  86706;     Ultrasound:                          mins  08847;  Iontophoresis:                     mins  68977;    Electrical Stimulation:         mins  48927 ( );  Mechanical Traction:          mins  41867  Dry Needling                       mins self-pay     Timed Treatment:   41    mins                  Total Treatment:     41    mins        Evelio Dumont PT  Physical Therapist  Lic # [id number]

## 2023-07-26 ENCOUNTER — TREATMENT (OUTPATIENT)
Dept: PHYSICAL THERAPY | Facility: CLINIC | Age: 63
End: 2023-07-26
Payer: MEDICARE

## 2023-07-26 DIAGNOSIS — R29.898 WEAKNESS OF LEFT LOWER EXTREMITY: ICD-10-CM

## 2023-07-26 DIAGNOSIS — R26.9 GAIT DISTURBANCE: ICD-10-CM

## 2023-07-26 DIAGNOSIS — Z74.09 IMPAIRED FUNCTIONAL MOBILITY, BALANCE, GAIT, AND ENDURANCE: ICD-10-CM

## 2023-07-26 DIAGNOSIS — Z96.652 S/P TKR (TOTAL KNEE REPLACEMENT), LEFT: Primary | ICD-10-CM

## 2023-07-26 DIAGNOSIS — M25.662 STIFFNESS OF LEFT KNEE: ICD-10-CM

## 2023-07-26 PROCEDURE — 97110 THERAPEUTIC EXERCISES: CPT | Performed by: PHYSICAL THERAPIST

## 2023-07-26 PROCEDURE — 97530 THERAPEUTIC ACTIVITIES: CPT | Performed by: PHYSICAL THERAPIST

## 2023-07-26 PROCEDURE — 97140 MANUAL THERAPY 1/> REGIONS: CPT | Performed by: PHYSICAL THERAPIST

## 2023-07-26 NOTE — PROGRESS NOTES
"Physical Therapy Daily Note    Patient Name: Alisha Connolly         :  1960  Referring Physician: Hilda Mello APRN  Treatment Date: 2023  Date of Initial Visit: No linked episodes    Visit Diagnoses:    ICD-10-CM ICD-9-CM   1. S/P TKR (total knee replacement), left  Z96.652 V43.65   2. Stiffness of left knee  M25.662 719.56   3. Gait disturbance  R26.9 781.2   4. Weakness of left lower extremity  R29.898 729.89   5. Impaired functional mobility, balance, gait, and endurance  Z74.09 V49.89       _____________________________________________________    Subjective   Alisha Connolly reports: less pain w/ stretching - Very stiff - stiffens back up shortly after stretching - Sleeping better -     Objective   Scar healing well - no longer redness at distal / prox scar -   Stiff into flexion - 105-deg at start       MANUAL THERAPY:   EDEMA MOBILIZATION lower leg / knee (L)  STM/DTM (L) Lower leg, posterior knee into HSs  DTM to SCAR, QUAD, etc to facil knee flexion  GENTLE ASSISTED ROM (L) KNEE FLEXION / EXTENSION w/ PROLONGED HOLDS in SITTING due to improved comfort -      EXERCISES:   SUPINE KNEE FLEX/EXTENSION w/ LEGS on BALL STRAP ASSIST for FLEXION  x 15/15 sec ea  90/90 KNEE FLEXION STRETCH 5/30 sec ea  SEATED KNEE FLEXION STRETCHING - \"Torture Chair\" 5/60 sec ea  NUSTEP  x 6 min L5  seat 9 Arms 10  REC BIKE Fwd/retro - Pt able to perform full rev Fwd / retro today - x 8 min  LAP on TRACK to assess gait and increase endurance - Cuing for knee flexion/extension-   ADD:   FWD RUNNER STEP -   LATERAL STEP UP w/ ABDuction   SIDE STEP -  MONSTER WALK -      Functional / Therapeutic Activities:    TAPING / BRACING: K-tape to Unload ant/med knee (L) - DEFER TODAY  SEE EXERCISE FLOW SHEET -  Instruction on putting together her own Torture Chair for knee flexion stretching to improved moiblity, gait / function -   Educated Alisha on need to increase knee flexion stretching to avoid a Closed Manipulation and " educated her on what a closed manipulation was -      Assessment/Plan  64 yo female; S/P (L) TKA - 6/20/2023 -    Taping helpful in reducing pain, but pain ant/med knee limiting knee extension ROM -     Progress strengthening /stabilization /functional activity       _________________________________________________    Manual Therapy:            10     mins  28373;  Therapeutic Exercise:    20    mins  69721;     Neuromuscular Ivan:        mins  53636;    Therapeutic Activity:     15      mins  61376;     Ultrasound:                          mins  07857;  Iontophoresis:                     mins  00323;    Electrical Stimulation:         mins  59115 ( );  Mechanical Traction:          mins  68215  Dry Needling                       mins self-pay     Timed Treatment:   45    mins                  Total Treatment:     45    mins        Evelio Dumont, PT  Physical Therapist  Lic # [id number]

## 2023-07-27 ENCOUNTER — PATIENT MESSAGE (OUTPATIENT)
Dept: ORTHOPEDIC SURGERY | Facility: CLINIC | Age: 63
End: 2023-07-27
Payer: MEDICARE

## 2023-07-27 DIAGNOSIS — Z96.652 STATUS POST TOTAL LEFT KNEE REPLACEMENT: ICD-10-CM

## 2023-07-28 DIAGNOSIS — Z96.652 STATUS POST TOTAL LEFT KNEE REPLACEMENT: Primary | ICD-10-CM

## 2023-07-28 RX ORDER — TRAMADOL HYDROCHLORIDE 50 MG/1
50 TABLET ORAL EVERY 4 HOURS PRN
Qty: 60 TABLET | Refills: 0 | Status: SHIPPED | OUTPATIENT
Start: 2023-07-28 | End: 2023-07-28 | Stop reason: SDUPTHER

## 2023-07-31 ENCOUNTER — TREATMENT (OUTPATIENT)
Dept: PHYSICAL THERAPY | Facility: CLINIC | Age: 63
End: 2023-07-31
Payer: MEDICARE

## 2023-07-31 DIAGNOSIS — M25.662 STIFFNESS OF LEFT KNEE: ICD-10-CM

## 2023-07-31 DIAGNOSIS — Z96.652 S/P TKR (TOTAL KNEE REPLACEMENT), LEFT: Primary | ICD-10-CM

## 2023-07-31 DIAGNOSIS — Z74.09 IMPAIRED FUNCTIONAL MOBILITY, BALANCE, GAIT, AND ENDURANCE: ICD-10-CM

## 2023-07-31 DIAGNOSIS — R29.898 WEAKNESS OF LEFT LOWER EXTREMITY: ICD-10-CM

## 2023-07-31 DIAGNOSIS — R26.9 GAIT DISTURBANCE: ICD-10-CM

## 2023-07-31 LAB — QT INTERVAL: 383 MS

## 2023-07-31 PROCEDURE — 97140 MANUAL THERAPY 1/> REGIONS: CPT | Performed by: PHYSICAL THERAPIST

## 2023-07-31 PROCEDURE — 97110 THERAPEUTIC EXERCISES: CPT | Performed by: PHYSICAL THERAPIST

## 2023-07-31 PROCEDURE — 97530 THERAPEUTIC ACTIVITIES: CPT | Performed by: PHYSICAL THERAPIST

## 2023-07-31 RX ORDER — TRAMADOL HYDROCHLORIDE 50 MG/1
50 TABLET ORAL EVERY 4 HOURS PRN
Qty: 60 TABLET | Refills: 0 | Status: SHIPPED | OUTPATIENT
Start: 2023-07-31

## 2023-08-02 ENCOUNTER — TREATMENT (OUTPATIENT)
Dept: PHYSICAL THERAPY | Facility: CLINIC | Age: 63
End: 2023-08-02
Payer: MEDICARE

## 2023-08-02 DIAGNOSIS — Z74.09 IMPAIRED FUNCTIONAL MOBILITY, BALANCE, GAIT, AND ENDURANCE: ICD-10-CM

## 2023-08-02 DIAGNOSIS — M25.662 STIFFNESS OF LEFT KNEE: ICD-10-CM

## 2023-08-02 DIAGNOSIS — R26.9 GAIT DISTURBANCE: ICD-10-CM

## 2023-08-02 DIAGNOSIS — Z96.652 S/P TKR (TOTAL KNEE REPLACEMENT), LEFT: Primary | ICD-10-CM

## 2023-08-02 DIAGNOSIS — R29.898 WEAKNESS OF LEFT LOWER EXTREMITY: ICD-10-CM

## 2023-08-02 PROCEDURE — 97140 MANUAL THERAPY 1/> REGIONS: CPT | Performed by: PHYSICAL THERAPIST

## 2023-08-02 PROCEDURE — 97110 THERAPEUTIC EXERCISES: CPT | Performed by: PHYSICAL THERAPIST

## 2023-08-02 PROCEDURE — 97530 THERAPEUTIC ACTIVITIES: CPT | Performed by: PHYSICAL THERAPIST

## 2023-08-03 ENCOUNTER — OFFICE VISIT (OUTPATIENT)
Dept: ORTHOPEDIC SURGERY | Facility: CLINIC | Age: 63
End: 2023-08-03
Payer: MEDICARE

## 2023-08-03 VITALS — WEIGHT: 200.1 LBS | TEMPERATURE: 97.3 F | HEIGHT: 63 IN | BODY MASS INDEX: 35.45 KG/M2

## 2023-08-03 DIAGNOSIS — Z96.652 STATUS POST TOTAL LEFT KNEE REPLACEMENT: ICD-10-CM

## 2023-08-03 DIAGNOSIS — R52 PAIN: Primary | ICD-10-CM

## 2023-08-05 DIAGNOSIS — I10 BENIGN ESSENTIAL HYPERTENSION: ICD-10-CM

## 2023-08-07 ENCOUNTER — TREATMENT (OUTPATIENT)
Dept: PHYSICAL THERAPY | Facility: CLINIC | Age: 63
End: 2023-08-07
Payer: MEDICARE

## 2023-08-07 DIAGNOSIS — M25.662 STIFFNESS OF LEFT KNEE: ICD-10-CM

## 2023-08-07 DIAGNOSIS — R29.898 WEAKNESS OF LEFT LOWER EXTREMITY: ICD-10-CM

## 2023-08-07 DIAGNOSIS — Z96.652 S/P TKR (TOTAL KNEE REPLACEMENT), LEFT: Primary | ICD-10-CM

## 2023-08-07 DIAGNOSIS — I10 BENIGN ESSENTIAL HYPERTENSION: ICD-10-CM

## 2023-08-07 DIAGNOSIS — R26.9 GAIT DISTURBANCE: ICD-10-CM

## 2023-08-07 DIAGNOSIS — Z74.09 IMPAIRED FUNCTIONAL MOBILITY, BALANCE, GAIT, AND ENDURANCE: ICD-10-CM

## 2023-08-07 PROCEDURE — 97110 THERAPEUTIC EXERCISES: CPT | Performed by: PHYSICAL THERAPIST

## 2023-08-07 PROCEDURE — 97530 THERAPEUTIC ACTIVITIES: CPT | Performed by: PHYSICAL THERAPIST

## 2023-08-07 RX ORDER — AMLODIPINE BESYLATE 5 MG/1
5 TABLET ORAL NIGHTLY
Qty: 30 TABLET | Refills: 1 | Status: SHIPPED | OUTPATIENT
Start: 2023-08-07 | End: 2023-08-10 | Stop reason: SDUPTHER

## 2023-08-07 NOTE — PROGRESS NOTES
Physical Therapy Daily Note    Patient Name: Alisha Connolly         :  1960  Referring Physician: Hilda Mello APRN  Treatment Date: 2023  Date of Initial Visit: No linked episodes    Visit Diagnoses:    ICD-10-CM ICD-9-CM   1. S/P TKR (total knee replacement), left  Z96.652 V43.65   2. Stiffness of left knee  M25.662 719.56   3. Gait disturbance  R26.9 781.2   4. Weakness of left lower extremity  R29.898 729.89   5. Impaired functional mobility, balance, gait, and endurance  Z74.09 V49.89       _____________________________________________________    Subjective   Alisha Connolly reports: saw  who was pleased with her progress - Making gains w/ stretching, but gets stiff again in about 20 min- Less pain - Using ice machine and elevating more which has been helpful in reducing her swelling - Purchased a strap stretch which is very helpful -     Objective   Pt ambulating w/ St cane -   Pt ambulated w/o cane - mildly antalgic, some limited flexion    EXERCISES:   PRONE KNEE FLEX / EXTENSION STRETCHING w/ STRAP ASSIST for KNEE FLEXION; 4x2 min ea  REC BIKE Fwd/retro -  x 8 min- - DEFER   FWD RUNNER STEP - (hole 3 from bottom) x 20  LATERAL STEP UP w/ ABDuction  Hole 2 from bottom) x 15  SIDE STEP -30 ft x 2 ea  MONSTER WALK - Fwd/Retro 40 ft x 2 ea  OCTANE: Seat 3; Tilt 3; L2 Fwd/Retro 8 min ()   2 LAPs on TRACK to assess gait and increase endurance - w/o cane -   Down 3 steps reciprocally w/ HR on (R)  Up 2 flights of stairs reciprocally w/ HR on (R) w/o cane       Functional / Therapeutic Activities:    TAPING / BRACING: NA  SEE EXERCISE FLOW SHEET -     Assessment/Plan  64 yo female; S/P (L) TKA - 2023   Benefitting from prone stretching and more time icing / elevation -   Ambulate well w/o AD - mildly antalgic - no cane at home, but when out for safetly    Progress strengthening /stabilization /functional activity       _________________________________________________    Manual Therapy:                  mins  30498;  Therapeutic Exercise:    20    mins  00578;     Neuromuscular Ivan:        mins  29451;    Therapeutic Activity:     23      mins  33134;     Ultrasound:                          mins  99539;  Iontophoresis:                     mins  16314;    Electrical Stimulation:         mins  06839 ( );  Mechanical Traction:          mins  84612  Dry Needling                       mins self-pay     Timed Treatment:   43    mins                  Total Treatment:     43    mins        Evelio Dumont PT  Physical Therapist  Lic # [id number]

## 2023-08-07 NOTE — TELEPHONE ENCOUNTER
Rx Refill Note  Requested Prescriptions     Pending Prescriptions Disp Refills    amLODIPine (NORVASC) 5 MG tablet [Pharmacy Med Name: AMLODIPINE BESYLATE 5MG TABLETS] 30 tablet 0     Sig: TAKE 1 TABLET BY MOUTH EVERY NIGHT      Last office visit with prescribing clinician: 06/08/2023  Last telemedicine visit with prescribing clinician: Visit date not found   Next office visit with prescribing clinician: Visit date not found                         Would you like a call back once the refill request has been completed: [] Yes [] No    If the office needs to give you a call back, can they leave a voicemail: [] Yes [] No    Sage Loera  08/07/23, 14:09 EDT

## 2023-08-08 NOTE — TELEPHONE ENCOUNTER
CALLED TO SCHEDULE PATIENT. SHE STATES SHE SHOULD ALSO BE RECEIVING A REFILL ON HER OTHER BP MED, OLMESARTAN 40 MG, ALONG WITH THIS PRESCRIPTION. PATIENT ALSO STATES SHE DOES NOT UNDERSTAND WHY HER PRESCRIPTION NEEDS TO BE OKAYED EVERY TIME BY NICOLE AS SHE DID NOT HAVE THIS PROCESS WITH HER LAST MEDICATION. PATIENT ALSO STATES SHE HAS JUST HAD KNEE REPLACEMENT SURGERY AND CAN HARDLY WALK AS WELL AS HAVING MANY APPOINTMENTS UPCOMING.     PLEASE CALL PATIENT TO DISCUSS THESE THINGS.

## 2023-08-09 ENCOUNTER — TREATMENT (OUTPATIENT)
Dept: PHYSICAL THERAPY | Facility: CLINIC | Age: 63
End: 2023-08-09
Payer: MEDICARE

## 2023-08-09 DIAGNOSIS — R26.9 GAIT DISTURBANCE: ICD-10-CM

## 2023-08-09 DIAGNOSIS — Z96.652 S/P TKR (TOTAL KNEE REPLACEMENT), LEFT: Primary | ICD-10-CM

## 2023-08-09 DIAGNOSIS — M25.662 STIFFNESS OF LEFT KNEE: ICD-10-CM

## 2023-08-09 DIAGNOSIS — Z74.09 IMPAIRED FUNCTIONAL MOBILITY, BALANCE, GAIT, AND ENDURANCE: ICD-10-CM

## 2023-08-09 PROCEDURE — 97110 THERAPEUTIC EXERCISES: CPT | Performed by: PHYSICAL THERAPIST

## 2023-08-09 PROCEDURE — 97530 THERAPEUTIC ACTIVITIES: CPT | Performed by: PHYSICAL THERAPIST

## 2023-08-09 PROCEDURE — 97112 NEUROMUSCULAR REEDUCATION: CPT | Performed by: PHYSICAL THERAPIST

## 2023-08-09 RX ORDER — AMLODIPINE BESYLATE 5 MG/1
5 TABLET ORAL NIGHTLY
Qty: 90 TABLET | OUTPATIENT
Start: 2023-08-09

## 2023-08-09 NOTE — PROGRESS NOTES
"Physical Therapy Daily Note    Patient Name: Alisha Connolly         :  1960  Referring Physician: Hilda Mello APRN  Treatment Date: 2023  Date of Initial Visit: Type: THERAPY  Noted: 7/3/2023    Visit Diagnoses:    ICD-10-CM ICD-9-CM   1. S/P TKR (total knee replacement), left  Z96.652 V43.65   2. Stiffness of left knee  M25.662 719.56   3. Gait disturbance  R26.9 781.2   4. Impaired functional mobility, balance, gait, and endurance  Z74.09 V49.89       _____________________________________________________    Subjective   Alisha Connolly reports: more emotional lately - \"over it\" - frustrated she can't do her normal activities - such as traveling to see children, etc.   Will be going to her local pool today - will try practicing to drive this weekend -   C/o pain anterior, ant/med, ant/lat with WB-activities     Objective   Pt ambulating w/ st cane w/ increased WB-ing LLE - able to ambulate w/o AD, but more antalgic  Tender lateral PF jt and ant/lat knee and ant/med knee (L)    EXERCISES:   PRONE KNEE FLEX / EXTENSION STRETCHING w/ STRAP ASSIST for KNEE FLEXION; 4x2 min ea  REC BIKE Fwd/retro -  x 8 min- -   FWD RUNNER STEP - (hole 4 from bottom) x 20  LATERAL STEP UP w/ ABDuction  Hole 3 from bottom) x 15  SIDE STEP -40 ft x 2 ea  MONSTER WALK - Fwd/Retro 40 ft x 2 ea  OCTANE: Seat 3; Tilt 3; L2 Fwd/Retro 8 min () - DEFER TODAY  2 LAPs on TRACK to assess gait and increase endurance - w/o cane -   Down 3 steps reciprocally w/ HR on (R) - DEFER TODAY  Up 2 flights of stairs reciprocally w/ HR on (R) w/o cane  - DEFER TODAY    NMR:   Verbal and tactile cues to facilitate  firing of glute medius musculature statically and dynamically and inhibition of HS/post knee w/ ext stretching. - SEE EXERCISE FLOW SHEET -         Functional / Therapeutic Activities:    TAPING / BRACING: K-Tape to 1) Unload PF jt; 2) Unload Ant/Med, Ant/Lat knee (L)   SEE EXERCISE FLOW SHEET -     Assessment/Plan  62 yo female; S/P " (L) TKA - 6/20/2023   Benefitting from prone stretching and more time icing / elevation -   Ambulate well w/o AD - mildly antalgic - no cane at home, but when out for safetly  PF Pain and pain ant/lat, ant/med limiting full WB-ing activities such as SLS and Step activities, but decreased w/ taping     Progress strengthening /stabilization /functional activity     _________________________________________________     Manual Therapy:                 mins  37572;  Therapeutic Exercise:    20    mins  56928;     Neuromuscular Ivan:   5     mins  03181;    Therapeutic Activity:     20      mins  87357;     Ultrasound:                          mins  84443;  Iontophoresis:                     mins  51435;    Electrical Stimulation:         mins  91301 ( );  Mechanical Traction:          mins  50473  Dry Needling                       mins self-pay     Timed Treatment:   43    mins                  Total Treatment:     43    mins        Evelio Dumont PT  Physical Therapist  Lic # [id number]

## 2023-08-09 NOTE — TELEPHONE ENCOUNTER
Patient needs to be seen per provider. Patient has had knee surgery and is unable to come in. I have worked the patient in as a video visit with Aarti Ogden on 08/10/23 and the provider will refill the medications at this time.

## 2023-08-10 ENCOUNTER — TELEMEDICINE (OUTPATIENT)
Dept: FAMILY MEDICINE CLINIC | Facility: CLINIC | Age: 63
End: 2023-08-10
Payer: MEDICARE

## 2023-08-10 DIAGNOSIS — I10 BENIGN ESSENTIAL HYPERTENSION: Primary | ICD-10-CM

## 2023-08-10 DIAGNOSIS — E11.9 TYPE 2 DIABETES MELLITUS WITHOUT COMPLICATION, WITHOUT LONG-TERM CURRENT USE OF INSULIN: ICD-10-CM

## 2023-08-10 PROCEDURE — 3044F HG A1C LEVEL LT 7.0%: CPT | Performed by: NURSE PRACTITIONER

## 2023-08-10 PROCEDURE — 1160F RVW MEDS BY RX/DR IN RCRD: CPT | Performed by: NURSE PRACTITIONER

## 2023-08-10 PROCEDURE — 1159F MED LIST DOCD IN RCRD: CPT | Performed by: NURSE PRACTITIONER

## 2023-08-10 PROCEDURE — 99213 OFFICE O/P EST LOW 20 MIN: CPT | Performed by: NURSE PRACTITIONER

## 2023-08-10 RX ORDER — AMLODIPINE BESYLATE 5 MG/1
5 TABLET ORAL NIGHTLY
Qty: 90 TABLET | Refills: 1 | Status: SHIPPED | OUTPATIENT
Start: 2023-08-10

## 2023-08-10 RX ORDER — OLMESARTAN MEDOXOMIL 40 MG/1
40 TABLET ORAL DAILY
Qty: 90 TABLET | Refills: 1 | Status: SHIPPED | OUTPATIENT
Start: 2023-08-10

## 2023-08-10 NOTE — PROGRESS NOTES
"Chief Complaint  Hypertension (F/u htn )    Subjective        Alisha Connolly presents to [facility]  History of Present Illness  pt scheduled video visit to f/u on HTN.  She is not able to come to office due to recent TKA. BP had been up and down.   Has been as high as 170-185.  This am has been 119/74, 116/79, 127/77, 135/80.   Pain is terrible-s/p TKA on right.   Working on learning to walk all over again  Not using walker  Taking and tolerating meds without side effects.     Will see cardio on 16th.  No chest pain, palpitations, HA, dizziness.        Objective   Vital Signs:  There were no vitals taken for this visit.  Estimated body mass index is 35.45 kg/mý as calculated from the following:    Height as of 8/3/23: 160 cm (63\").    Weight as of 8/3/23: 90.8 kg (200 lb 1.6 oz).               Physical Exam  Limited by video visit.  She is well appearing and does not seem to be distressed.  She seems alert and oriented and her mood and affect are normal, good historian of medical history.  No cough or dyspnea appreciated, able to complete sentences without problem.     Result Review :                   Assessment and Plan   Diagnoses and all orders for this visit:    1. Benign essential hypertension (Primary)  -     amLODIPine (NORVASC) 5 MG tablet; Take 1 tablet by mouth Every Night. Indications: High Blood Pressure Disorder  Dispense: 90 tablet; Refill: 1  -     olmesartan (BENICAR) 40 MG tablet; Take 1 tablet by mouth Daily. Indications: High Blood Pressure Disorder  Dispense: 90 tablet; Refill: 1    2. Type 2 diabetes mellitus without complication, without long-term current use of insulin    HTN:  seems to be stable on current meds which we will continue.  Pain may be causing some elevations.     She is taking and tolerating metformin for diabetes and will continue this. A1C in May was at goal.      Zoom platform-mobile device in office, pat at home.  Good A/V quality     I spent " 15 minutes caring for lAisha on this date of service. This time includes time spent by me in the following activities:preparing for the visit, reviewing tests, performing a medically appropriate examination and/or evaluation , counseling and educating the patient/family/caregiver, ordering medications, tests, or procedures, and documenting information in the medical record  Follow Up   No follow-ups on file.  Patient was given instructions and counseling regarding her condition or for health maintenance advice. Please see specific information pulled into the AVS if appropriate.

## 2023-08-14 ENCOUNTER — TREATMENT (OUTPATIENT)
Dept: PHYSICAL THERAPY | Facility: CLINIC | Age: 63
End: 2023-08-14
Payer: MEDICARE

## 2023-08-14 DIAGNOSIS — Z96.652 S/P TKR (TOTAL KNEE REPLACEMENT), LEFT: Primary | ICD-10-CM

## 2023-08-14 DIAGNOSIS — M25.662 STIFFNESS OF LEFT KNEE: ICD-10-CM

## 2023-08-14 DIAGNOSIS — R26.9 GAIT DISTURBANCE: ICD-10-CM

## 2023-08-14 DIAGNOSIS — Z74.09 IMPAIRED FUNCTIONAL MOBILITY, BALANCE, GAIT, AND ENDURANCE: ICD-10-CM

## 2023-08-14 DIAGNOSIS — R29.898 WEAKNESS OF LEFT LOWER EXTREMITY: ICD-10-CM

## 2023-08-14 PROCEDURE — 97110 THERAPEUTIC EXERCISES: CPT | Performed by: PHYSICAL THERAPIST

## 2023-08-14 PROCEDURE — 97530 THERAPEUTIC ACTIVITIES: CPT | Performed by: PHYSICAL THERAPIST

## 2023-08-14 PROCEDURE — 97112 NEUROMUSCULAR REEDUCATION: CPT | Performed by: PHYSICAL THERAPIST

## 2023-08-14 NOTE — PROGRESS NOTES
Physical Therapy Daily Note    Patient Name: Alisha Connolly         :  1960  Referring Physician: Hilda Mello APRN  Treatment Date: 2023  Date of Initial Visit: No linked episodes    Visit Diagnoses:    ICD-10-CM ICD-9-CM   1. S/P TKR (total knee replacement), left  Z96.652 V43.65   2. Stiffness of left knee  M25.662 719.56   3. Gait disturbance  R26.9 781.2   4. Impaired functional mobility, balance, gait, and endurance  Z74.09 V49.89   5. Weakness of left lower extremity  R29.898 729.89       _____________________________________________________    Subjective   Alisha Connolly reports: Quads sore after last session;  Went to pool and exercises for 1 hour - mm sores, but knee did well -   Tape helpful, but seemed to increase her pain when sleeping and took the tape off and felt better -   Been out and about more - doing well - `    Objective   (L) Knee diffusely less swollen -     EXERCISES:   PRONE KNEE FLEX / EXTENSION STRETCHING w/ STRAP ASSIST for KNEE FLEXION; 4x2 min ea  FWD RUNNER STEP - (hole 4 from bottom) x 15  LATERAL STEP UP w/ ABDuction  Hole 3 from bottom) x 15  SIDE STEP -40 ft x 2 ea  DEFER (no time)  MONSTER WALK - Fwd/Retro 40 ft x 2 ea- DEFER (no time)  2 LAPs on TRACK to assess gait and increase endurance - w/o cane -   Up / DOWN 2 flights of stairs reciprocally w/ HR on (R) w/o cane  -  OCTANE: Seat 3; Tilt 3; L2 Fwd/Retro 8 min ()    ADD:  MATRIX HIP ADDuction  MATRIC HIP ABDucton  HELDER LEG PRESS w/ Flexion Stretch   SLS  BALANCE BOARD    NMR:   Verbal and tactile cues to facilitate  firing of glute medius and quad musculature statically and dynamically . - SEE EXERCISEs  -         Functional / Therapeutic Activities:    TAPING / BRACING: NA  SEE EXERCISE FLOW SHEET -     Assessment/Plan  62 yo female; S/P (L) TKA - 2023   Benefitting from prone stretching and more time icing / elevation -   Ambulate well w/o AD - mildly antalgic - no cane at home, but when out for  safetly  Increased MM soreness w/ PT activities - and minimal knee pain     Progress strengthening /stabilization /functional activity       _________________________________________________    Manual Therapy:                 mins  25878;  Therapeutic Exercise:    20    mins  47060;     Neuromuscular Ivan:   05     mins  20845;    Therapeutic Activity:     17      mins  88038;     Ultrasound:                          mins  00195;  Iontophoresis:                     mins  30650;    Electrical Stimulation:         mins  89848 ( );  Mechanical Traction:          mins  79119  Dry Needling                       mins self-pay     Timed Treatment:   42    mins                  Total Treatment:     42    mins        Evelio Dumont, PT  Physical Therapist  Lic # [id number]

## 2023-08-16 ENCOUNTER — TREATMENT (OUTPATIENT)
Dept: PHYSICAL THERAPY | Facility: CLINIC | Age: 63
End: 2023-08-16
Payer: MEDICARE

## 2023-08-16 DIAGNOSIS — Z96.652 S/P TKR (TOTAL KNEE REPLACEMENT), LEFT: Primary | ICD-10-CM

## 2023-08-16 DIAGNOSIS — Z74.09 IMPAIRED FUNCTIONAL MOBILITY, BALANCE, GAIT, AND ENDURANCE: ICD-10-CM

## 2023-08-16 DIAGNOSIS — R29.898 WEAKNESS OF LEFT LOWER EXTREMITY: ICD-10-CM

## 2023-08-16 DIAGNOSIS — R26.9 GAIT DISTURBANCE: ICD-10-CM

## 2023-08-16 DIAGNOSIS — M25.662 STIFFNESS OF LEFT KNEE: ICD-10-CM

## 2023-08-16 NOTE — PROGRESS NOTES
Physical Therapy Daily Note    Patient Name: Alisha Connolly         :  1960  Referring Physician: Hilda Mello APRN  Treatment Date: 2023  Date of Initial Visit: Type: THERAPY  Noted: 7/3/2023    Visit Diagnoses:    ICD-10-CM ICD-9-CM   1. S/P TKR (total knee replacement), left  Z96.652 V43.65   2. Stiffness of left knee  M25.662 719.56   3. Gait disturbance  R26.9 781.2   4. Impaired functional mobility, balance, gait, and endurance  Z74.09 V49.89   5. Weakness of left lower extremity  R29.898 729.89       _____________________________________________________    Subjective   Alisha Connolly reports: working on stretching in prone today - Helpful - Anxious to get her (R) knee stronger - Gives way at times -     Objective   Pt ambulating w/ st cane - Able to ambulate w/o cane, but limited WB-ing RLE - but improved -   Able to ambulate up/down 2 flights of stairs reciprocally w/ less of a dependency on HR going up w/ cuing -     EXERCISES:   PRONE KNEE FLEX / EXTENSION STRETCHING w/ STRAP ASSIST for KNEE FLEXION; 4x2 min ea  FWD RUNNER STEP - (hole 4 from bottom) x 15  LATERAL STEP UP w/ ABDuction  Hole 3 from bottom) x 15  SIDE STEP -40 ft x 2 ea  DEFER (no time)  MONSTER WALK - Fwd/Retro 40 ft x 2 ea- DEFER (no time)  2 LAPs on TRACK to assess gait and increase endurance - w/o cane -   Up / DOWN 2 flights of stairs reciprocally w/ HR on (R) w/o cane  -  OCTANE: Seat 3; Tilt 3; L2 Fwd/Retro 8 min ()    ADD:  MATRIX HIP ADDuction 30# x 20  MATRIC HIP ABDucton 20# x 20  MATRIX LEG PRESS 40# x 20  SLS  2 min (R)  ADD:  BALANCE BOARD     NMR:   Verbal and tactile cues to facilitate  firing of glute medius and quad musculature statically and dynamically . Balance / Proprioception activities -- SEE EXERCISEs  -         Functional / Therapeutic Activities:    TAPING / BRACING: NA  SEE EXERCISE FLOW SHEET -     Assessment/Plan  64 yo female: S/P (R) TKA 2023   Benefitting from prone stretching and more  time icing / elevation -   Ambulate well w/o AD - mildly antalgic - no cane at home, but when out for safetly  Increased MM soreness w/ PT activities - and minimal knee pain      Progress strengthening /stabilization /functional activity       _________________________________________________    Manual Therapy:                 mins  90363;  Therapeutic Exercise:    17    mins  24480;     Neuromuscular Ivan:   05     mins  57819;    Therapeutic Activity:     20      mins  35389;     Ultrasound:                          mins  45733;    Electrical Stimulation:         mins  66410 ( );  Dry Needling                       mins self-pay     Timed Treatment:   42    mins                  Total Treatment:     42    mins        Evelio Dumont, PT  Physical Therapist  Lic # [id number]

## 2023-08-21 ENCOUNTER — TREATMENT (OUTPATIENT)
Dept: PHYSICAL THERAPY | Facility: CLINIC | Age: 63
End: 2023-08-21
Payer: MEDICARE

## 2023-08-21 DIAGNOSIS — Z96.652 S/P TKR (TOTAL KNEE REPLACEMENT), LEFT: Primary | ICD-10-CM

## 2023-08-21 DIAGNOSIS — R26.9 GAIT DISTURBANCE: ICD-10-CM

## 2023-08-21 DIAGNOSIS — Z74.09 IMPAIRED FUNCTIONAL MOBILITY, BALANCE, GAIT, AND ENDURANCE: ICD-10-CM

## 2023-08-21 DIAGNOSIS — M25.662 STIFFNESS OF LEFT KNEE: ICD-10-CM

## 2023-08-21 DIAGNOSIS — R29.898 WEAKNESS OF LEFT LOWER EXTREMITY: ICD-10-CM

## 2023-08-21 NOTE — PROGRESS NOTES
Physical Therapy Daily Note    Patient Name: Alisha Connolly         :  1960  Referring Physician: Hilda Mello APRN  Treatment Date: 2023  Date of Initial Visit: No linked episodes    Visit Diagnoses:    ICD-10-CM ICD-9-CM   1. S/P TKR (total knee replacement), left  Z96.652 V43.65   2. Stiffness of left knee  M25.662 719.56   3. Gait disturbance  R26.9 781.2   4. Impaired functional mobility, balance, gait, and endurance  Z74.09 V49.89   5. Weakness of left lower extremity  R29.898 729.89       _____________________________________________________    Subjective   Alisha Connolly reports: (L) knee doing much better, but has noted increased pain (R) knee (anterior knee pain) feels like its going to give out-   Worse after prone stretching for (L) knee - today, but had been bothering her for the last few days -     Objective   Less swelling (L) knee -   Pain w/ walking and stepping up / down (R) knee -     EXERCISES:   PRONE KNEE FLEX / EXTENSION STRETCHING w/ STRAP ASSIST for KNEE FLEXION; 4x2 min ea - DEFER   FWD RUNNER STEP - (hole 4 from bottom) x 20  LATERAL STEP UP w/ ABDuction  Hole 4 from bottom) x 20  SIDE STEP -40 ft x 2 ea  DEFER (no time)  MONSTER WALK - Fwd/Retro 40 ft x 2 ea- DEFER (no time)  2 LAPs on TRACK to assess gait and increase endurance - w/o cane -   Up / DOWN 2 flights of stairs reciprocally w/ HR on (R) w/o cane  -  OCTANE: Seat 3; Tilt 3; L5 Fwd/Retro 8 min ()    MATRIX HIP ADDuction 30# x 20  MATRIC HIP ABDucton 30# x 20  MATRIX LEG PRESS 60# x 20  ADD:  SLS  2 min (R)  BALANCE BOARD     NMR:   Verbal and tactile cues to facilitate  firing of glute medius and quad musculature statically and dynamically . Balance / Proprioception activities -- SEE EXERCISEs  -         Functional / Therapeutic Activities:    TAPING / BRACING: K-Tape to 1) Unload PF Jt (R) ; 2) K-tape Lat -> Med to prevent lat tracking patella to decrease pain and improve mobility/function and tolerance to PT  activities -   SEE EXERCISE FLOW SHEET -     Assessment/Plan  64 yo female: S/P (R) TKA 6/20/2023   Benefitting from prone stretching and more time icing / elevation -   Ambulate well w/o AD - mildly antalgic - no cane at home, but when out for safetly  Improved stairs, but most limited down steps -   Onset of (R) knee pain - Alleviated w/ kinesio taping -     Progress strengthening /stabilization /functional activity       _________________________________________________    Manual Therapy:                 mins  91564;  Therapeutic Exercise:    20    mins  44765;     Neuromuscular Ivan:   05     mins  02882;    Therapeutic Activity:      20      mins  75100;     Ultrasound:                          mins  36359;  Iontophoresis:                     mins  61043;    Electrical Stimulation:         mins  27981 ( );  Mechanical Traction:          mins  10852  Dry Needling                       mins self-pay     Timed Treatment:   45    mins                  Total Treatment:     45    mins        Evelio Dumont PT  Physical Therapist  Lic # [id number]

## 2023-08-23 ENCOUNTER — TREATMENT (OUTPATIENT)
Dept: PHYSICAL THERAPY | Facility: CLINIC | Age: 63
End: 2023-08-23
Payer: MEDICARE

## 2023-08-23 DIAGNOSIS — R26.9 GAIT DISTURBANCE: ICD-10-CM

## 2023-08-23 DIAGNOSIS — Z74.09 IMPAIRED FUNCTIONAL MOBILITY, BALANCE, GAIT, AND ENDURANCE: ICD-10-CM

## 2023-08-23 DIAGNOSIS — R29.898 WEAKNESS OF LEFT LOWER EXTREMITY: ICD-10-CM

## 2023-08-23 DIAGNOSIS — M25.662 STIFFNESS OF LEFT KNEE: ICD-10-CM

## 2023-08-23 DIAGNOSIS — Z96.652 S/P TKR (TOTAL KNEE REPLACEMENT), LEFT: Primary | ICD-10-CM

## 2023-08-23 NOTE — PROGRESS NOTES
Physical Therapy Daily Note    Patient Name: Alisha Connolly         :  1960  Referring Physician: Hilda Mello APRN  Treatment Date: 2023  Date of Initial Visit: Type: THERAPY  Noted: 7/3/2023    Visit Diagnoses:    ICD-10-CM ICD-9-CM   1. S/P TKR (total knee replacement), left  Z96.652 V43.65   2. Stiffness of left knee  M25.662 719.56   3. Gait disturbance  R26.9 781.2   4. Impaired functional mobility, balance, gait, and endurance  Z74.09 V49.89   5. Weakness of left lower extremity  R29.898 729.89       _____________________________________________________    Subjective   Alisha Connolly reports: (R) knee much better - intermittent sharp pain ant/lat knee at rest / sleeping (L) knee -   Since taking Meloxicam regularly, she is noting decreased swelling and improving flexibility / ROM - Not even using cane at home at all now -     Objective   Pt ambulating w/o AD - mildly antalgic gait LLE - apparent foot slap (L) w/ gait -   Grossly decreased swelling -     EXERCISES:   PRONE KNEE FLEX / EXTENSION STRETCHING w/ STRAP ASSIST for KNEE FLEXION; 4x2 min ea - DEFER   FWD RUNNER STEP - (hole 4 from bottom) x 20  LATERAL STEP UP w/ ABDuction  Hole 4 from bottom) x 20  SIDE STEP -40 ft x 2 ea  DEFER (no time)  MONSTER WALK - Fwd/Retro 40 ft x 2 ea- DEFER (no time)  2 LAPs on TRACK to assess gait and increase endurance - w/o cane -   Up / DOWN 2 flights of stairs reciprocally w/ HR on (R) w/o cane  -  OCTANE: Seat 3; Tilt 3; L5 Fwd/Retro 8 min ()    MATRIX HIP ADDuction 30# x 20  MATRIC HIP ABDucton 30# x 20  MATRIX LEG PRESS 60# x 20  STANDING HEEL/TOE RAISES x 20  ACHILLES STRETCH OFF STEP (B) x 60 sec  SLS (L) 2 min  BALANCE BOARD Frnt/Bk;  Side / Side 2 min ea      NMR:   Verbal and tactile cues to facilitate  firing of glute medius and quad musculature statically and dynamically . Balance / Proprioception activities -- SEE EXERCISEs  -         Functional / Therapeutic Activities:    TAPING / BRACING:  NA  SEE EXERCISEs    Assessment/Plan  64 yo female: S/P (R) TKA 6/20/2023   Benefitting from prone stretching and more time icing / elevation -   Ambulate well w/o AD - mildly antalgic - no cane at home, but when out for safetly  Improved stairs, but most limited down steps -     Progress strengthening /stabilization /functional activity       _________________________________________________    Manual Therapy:                 mins  24649;  Therapeutic Exercise:    18    mins  88513;     Neuromuscular Ivan:   06     mins  21392;    Therapeutic Activity:     20      mins  07551;     Ultrasound:                          mins  11580;  Iontophoresis:                     mins  73339;    Electrical Stimulation:         mins  48411 ( );  Mechanical Traction:          mins  35499  Dry Needling                       mins self-pay     Timed Treatment:    44   mins                  Total Treatment:     44    mins        Evelio Dumont PT  Physical Therapist  Lic # [id number]

## 2023-08-28 ENCOUNTER — TREATMENT (OUTPATIENT)
Dept: PHYSICAL THERAPY | Facility: CLINIC | Age: 63
End: 2023-08-28
Payer: MEDICARE

## 2023-08-28 DIAGNOSIS — Z74.09 IMPAIRED FUNCTIONAL MOBILITY, BALANCE, GAIT, AND ENDURANCE: ICD-10-CM

## 2023-08-28 DIAGNOSIS — M25.662 STIFFNESS OF LEFT KNEE: ICD-10-CM

## 2023-08-28 DIAGNOSIS — Z96.652 S/P TKR (TOTAL KNEE REPLACEMENT), LEFT: Primary | ICD-10-CM

## 2023-08-28 DIAGNOSIS — R26.9 GAIT DISTURBANCE: ICD-10-CM

## 2023-08-28 DIAGNOSIS — R29.898 WEAKNESS OF LEFT LOWER EXTREMITY: ICD-10-CM

## 2023-08-28 PROCEDURE — 97530 THERAPEUTIC ACTIVITIES: CPT | Performed by: PHYSICAL THERAPIST

## 2023-08-28 PROCEDURE — 97112 NEUROMUSCULAR REEDUCATION: CPT | Performed by: PHYSICAL THERAPIST

## 2023-08-28 PROCEDURE — 97110 THERAPEUTIC EXERCISES: CPT | Performed by: PHYSICAL THERAPIST

## 2023-08-28 NOTE — PROGRESS NOTES
Physical Therapy Daily Note    Patient Name: Alisha Connolly         :  1960  Referring Physician: Hilda Mello APRN  Treatment Date: 2023  Date of Initial Visit: No linked episodes    Visit Diagnoses:    ICD-10-CM ICD-9-CM   1. S/P TKR (total knee replacement), left  Z96.652 V43.65   2. Stiffness of left knee  M25.662 719.56   3. Gait disturbance  R26.9 781.2   4. Impaired functional mobility, balance, gait, and endurance  Z74.09 V49.89   5. Weakness of left lower extremity  R29.898 729.89       _____________________________________________________    Subjective   Alisha Connolly reports: walking a lot more - no problems - notes ant/lat stiffness w/ flexion - notes left foot pain this weekend (from doing heel/toe raises?)     Objective   Pt ambulating w/o AD - improved WB-ing LLE - Improved recip stairs down w/ less hesitation and improved pace and up stairs w/ minimal need for handrail -     EXERCISES:   FWD RUNNER STEP - (hole 4 from bottom) x 20  LATERAL STEP UP w/ ABDuction  Hole 4 from bottom) x 20  SIDE STEP -40 ft x 2 ea  DEFER (no time)  MONSTER WALK - Fwd/Retro 40 ft x 2 ea- DEFER (no time)  2 LAPs on TRACK to assess gait and increase endurance - w/o cane -   Up / DOWN 2 flights of stairs reciprocally w/ HR on (R) w/o cane  -  OCTANE: Seat 3; Tilt 3; L5 Fwd/Retro 8 min ()    MATRIX HIP ADDuction 35# x 20  MATRIC HIP ABDucton 35# x 20  MATRIX LEG PRESS 75# x 25  (INCREASE TO 85# + next Time)  STANDING HEEL/TOE RAISES x 20  -DEFER TODAY  ACHILLES STRETCH OFF STEP (B) x 60 sec - -DEFER TODAY  SLS (L) 2x 1min each leg  BALANCE BOARD Frnt/Bk;  Side / Side, Angled x 2    - 2 min ea      NMR:   Verbal and tactile cues to facilitate  firing of glute medius and quad musculature statically and dynamically . Balance / Proprioception activities -- SEE EXERCISEs  -      Functional / Therapeutic Activities:    TAPING / BRACING: NA  SEE EXERCISEs     Assessment/Plan  64 yo female: S/P (R) TKA 2023    Benefitting from prone stretching and more time icing / elevation -   Ambulate well w/o AD - mildly antalgic - no cane at home, but when out for safetly  Improved stairs, but most limited down steps -     Progress strengthening /stabilization /functional activity       KX MODIFIER  _________________________________________________    Manual Therapy:                 mins  85362;  KX MODIFIER  Therapeutic Exercise:    18    mins  85961;   KX MODIFIER  Neuromuscular Ivan:   06     mins  45471;  KX MODIFIER  Therapeutic Activity:     20      mins  92209;   KX MODIFIER  Ultrasound:                          mins  06183;  Iontophoresis:                     mins  20375;   Electrical Stimulation:         mins  02273 ( );     Timed Treatment:   44    mins                  Total Treatment:     44    mins        Evelio Dumont, PT  Physical Therapist  Lic # [id number]

## 2023-08-30 ENCOUNTER — TREATMENT (OUTPATIENT)
Dept: PHYSICAL THERAPY | Facility: CLINIC | Age: 63
End: 2023-08-30
Payer: MEDICARE

## 2023-08-30 DIAGNOSIS — M25.662 STIFFNESS OF LEFT KNEE: ICD-10-CM

## 2023-08-30 DIAGNOSIS — R26.9 GAIT DISTURBANCE: ICD-10-CM

## 2023-08-30 DIAGNOSIS — Z96.652 S/P TKR (TOTAL KNEE REPLACEMENT), LEFT: Primary | ICD-10-CM

## 2023-08-30 DIAGNOSIS — R29.898 WEAKNESS OF LEFT LOWER EXTREMITY: ICD-10-CM

## 2023-08-30 DIAGNOSIS — Z74.09 IMPAIRED FUNCTIONAL MOBILITY, BALANCE, GAIT, AND ENDURANCE: ICD-10-CM

## 2023-08-30 PROCEDURE — 97530 THERAPEUTIC ACTIVITIES: CPT | Performed by: PHYSICAL THERAPIST

## 2023-08-30 PROCEDURE — 97112 NEUROMUSCULAR REEDUCATION: CPT | Performed by: PHYSICAL THERAPIST

## 2023-08-30 PROCEDURE — 97110 THERAPEUTIC EXERCISES: CPT | Performed by: PHYSICAL THERAPIST

## 2023-08-30 NOTE — PROGRESS NOTES
Physical Therapy Daily Note    Patient Name: Alisha Connolly         :  1960  Referring Physician: Hilda Mello APRN  Treatment Date: 2023  Date of Initial Visit: Type: THERAPY  Noted: 7/3/2023    Visit Diagnoses:    ICD-10-CM ICD-9-CM   1. S/P TKR (total knee replacement), left  Z96.652 V43.65   2. Stiffness of left knee  M25.662 719.56   3. Gait disturbance  R26.9 781.2   4. Impaired functional mobility, balance, gait, and endurance  Z74.09 V49.89   5. Weakness of left lower extremity  R29.898 729.89       _____________________________________________________    Subjective   Alisha Connolly reports: intermittent pulling pain ant/lat knee (L) w/ going down stairs, bending, etc. Drove herself to PT today -     Objective   Pt ambulating w/o AD - increased WB-ing LLE and grossly decreased foot slap (L) w/ gait -     EXERCISES:   OCTANE Seat 3 L7 8 min Fwd/retr ()  Up/down Stairs x 2 flights x 2;  Going up 2 flights of stairs LLE only;    FWD RUNNER STEP - (hole 4 from bottom) (see above)  LATERAL STEP UP w/ ABDuction  (Up/down 2 flights of steps LLE Side stepping up/down)  MATRIX HIP ADDuction 35# x 20  MATRIC HIP ABDucton 35# x 20  MATRIX LEG PRESS 80# x 25    STANDING HEEL/TOE RAISES x 20  -DEFER TODAY  ACHILLES STRETCH OFF STEP (B) x 60 sec - -DEFER TODAY  SLS (L) 2x 1min each leg  BALANCE BOARD Frnt/Bk;  Side / Side, Angled x 2    - 2 min ea      NMR:   Verbal and tactile cues to facilitate  firing of glute medius and quad musculature statically and dynamically . Balance / Proprioception activities -- SEE EXERCISEs  -     Functional / Therapeutic Activities:    TAPING / BRACING: NA  SEE EXERCISE FLOW SHEET -     Assessment/Plan  62 yo female: S/P (R) TKA   Ambulate well w/o AD - mildly antalgic   Improved stairs, but most limited down steps -         Progress strengthening /stabilization /functional activity       _________________________________________________    Manual Therapy:                  mins  04349;  Therapeutic Exercise:    20    mins  78329;     Neuromuscular Ivan:   08     mins  90304;    Therapeutic Activity:     15      mins  02281;     Ultrasound:                          mins  77571;  Iontophoresis:                     mins  68819;    Electrical Stimulation:         mins  62386 ( );  Mechanical Traction:          mins  14838  Dry Needling                       mins self-pay     Timed Treatment:   43    mins                  Total Treatment:     43    mins        Evelio Dumont PT  Physical Therapist  Lic # [id number]

## 2023-09-05 ENCOUNTER — TREATMENT (OUTPATIENT)
Dept: PHYSICAL THERAPY | Facility: CLINIC | Age: 63
End: 2023-09-05
Payer: MEDICARE

## 2023-09-05 DIAGNOSIS — R26.9 GAIT DISTURBANCE: ICD-10-CM

## 2023-09-05 DIAGNOSIS — Z96.652 S/P TKR (TOTAL KNEE REPLACEMENT), LEFT: Primary | ICD-10-CM

## 2023-09-05 DIAGNOSIS — Z74.09 IMPAIRED FUNCTIONAL MOBILITY, BALANCE, GAIT, AND ENDURANCE: ICD-10-CM

## 2023-09-05 DIAGNOSIS — M25.662 STIFFNESS OF LEFT KNEE: ICD-10-CM

## 2023-09-05 PROCEDURE — 97530 THERAPEUTIC ACTIVITIES: CPT | Performed by: PHYSICAL THERAPIST

## 2023-09-05 PROCEDURE — 97110 THERAPEUTIC EXERCISES: CPT | Performed by: PHYSICAL THERAPIST

## 2023-09-05 PROCEDURE — 97112 NEUROMUSCULAR REEDUCATION: CPT | Performed by: PHYSICAL THERAPIST

## 2023-09-05 NOTE — PROGRESS NOTES
Physical Therapy Daily Note    Patient Name: Alisha Connolly         :  1960  Referring Physician: Hilda Mello APRN  Treatment Date: 2023  Date of Initial Visit: No linked episodes    Visit Diagnoses:    ICD-10-CM ICD-9-CM   1. S/P TKR (total knee replacement), left  Z96.652 V43.65   2. Stiffness of left knee  M25.662 719.56   3. Gait disturbance  R26.9 781.2   4. Impaired functional mobility, balance, gait, and endurance  Z74.09 V49.89       _____________________________________________________    Subjective   Alisha Connolly reports: Less pain ant/lat knee -     Objective   Pt ambulating w/o AD - increased WB-ing LLE and grossly decreased foot slap (L) w/ gait, but present if Pt is not thinking about it  -      EXERCISES:   NEW OCTANE Seat 5 L7 8 min Fwd/retr ()  Up/down Stairs x 2 flights x 2;  Going up 2 flights of stairs LLE only;    FWD RUNNER STEP - (hole 4 from bottom) (see above)  LATERAL STEP UP w/ ABDuction  (Up/down 2 flights of steps LLE Side stepping up/down)  MATRIX HIP ADDuction 40# x 20  MATRIC HIP ABDucton 40# x 20  MATRIX LEG PRESS 80# x 25    STANDING against wall - TOE RAISES x 20  Unilateral w/ emphasis on ecc lowering   ACHILLES STRETCH OFF STEP (B) x 60 sec - -DEFER TODAY  SLS (L) 2x 1min each leg  BALANCE BOARD Frnt/Bk;  Side / Side, Angled x 2    - 2 min ea      NMR:   Verbal and tactile cues to facilitate  firing of glute medius and quad musculature statically and dynamically . Balance / Proprioception activities -- SEE EXERCISEs  -      Functional / Therapeutic Activities:    TAPING / BRACING: NA  SEE EXERCISE FLOW SHEET -     Assessment/Plan  62 yo female: S/P (R) TKA   Ambulate well w/o AD - mildly antalgic   Improved stairs, but most limited down steps -    Foot slap (L) w/ gait - Improving with strengthening w/ ecc control - Less when Pt thinks about it -     Progress strengthening /stabilization /functional activity        _________________________________________________    Manual Therapy:                 mins  20596;  Therapeutic Exercise:    18    mins  77799;     Neuromuscular Ivan:   06     mins  42449;    Therapeutic Activity:     20      mins  32752;     Ultrasound:                          mins  32592;  Iontophoresis:                     mins  35400;    Electrical Stimulation:         mins  15966 ( );  Mechanical Traction:          mins  25243  Dry Needling                       mins self-pay     Timed Treatment:   44    mins                  Total Treatment:     44    mins        Evelio Dumont PT  Physical Therapist  Lic # [id number]

## 2023-09-07 ENCOUNTER — TELEPHONE (OUTPATIENT)
Dept: PHYSICAL THERAPY | Facility: CLINIC | Age: 63
End: 2023-09-07

## 2023-09-07 NOTE — TELEPHONE ENCOUNTER
Caller: Alisha Connolly    Relationship: Self    What was the call regarding: PATIENT IS UNABLE TO COME TO TODAY'S APPT DUE TO A WORK CONFLICT

## 2023-09-11 ENCOUNTER — TREATMENT (OUTPATIENT)
Dept: PHYSICAL THERAPY | Facility: CLINIC | Age: 63
End: 2023-09-11
Payer: MEDICARE

## 2023-09-11 DIAGNOSIS — R29.898 WEAKNESS OF LEFT LOWER EXTREMITY: ICD-10-CM

## 2023-09-11 DIAGNOSIS — M25.662 STIFFNESS OF LEFT KNEE: ICD-10-CM

## 2023-09-11 DIAGNOSIS — Z96.652 S/P TKR (TOTAL KNEE REPLACEMENT), LEFT: Primary | ICD-10-CM

## 2023-09-11 DIAGNOSIS — R26.9 GAIT DISTURBANCE: ICD-10-CM

## 2023-09-11 DIAGNOSIS — Z74.09 IMPAIRED FUNCTIONAL MOBILITY, BALANCE, GAIT, AND ENDURANCE: ICD-10-CM

## 2023-09-11 NOTE — PROGRESS NOTES
Physical Therapy Daily Note    Patient Name: Alisha Connolly         :  1960  Referring Physician: Hilda Mello APRN  Treatment Date: 2023  Date of Initial Visit: Type: THERAPY  Noted: 7/3/2023    Visit Diagnoses:    ICD-10-CM ICD-9-CM   1. S/P TKR (total knee replacement), left  Z96.652 V43.65   2. Stiffness of left knee  M25.662 719.56   3. Gait disturbance  R26.9 781.2   4. Impaired functional mobility, balance, gait, and endurance  Z74.09 V49.89   5. Weakness of left lower extremity  R29.898 729.89       _____________________________________________________    Subjective   Alisha Connolly reports: twisted her (R) knee Friday noting immediate and persistent anterior knee pain (R) since - and notes crunching now as well -   (L) Knee - back to normal activities -some residual swelling in knee and lower leg -     Objective   Pt ambulating w/ mildly antalgic gait RLE    EXERCISES:   NEW OCTANE Seat 5 L7 8 min Fwd/retr ()  Up/down Stairs x 2 flights x 2;  Going up 2 flights of stairs LLE only;    FWD RUNNER STEP - (hole 4 from bottom) (see above)  LATERAL STEP UP w/ ABDuction  (Up/down 2 flights of steps LLE Side stepping up/down)  MATRIX HIP ADDuction 40# 2x 15  MATRIC HIP ABDucton 50# 2x 15  MATRIX LEG PRESS 80# 2x 15    STANDING against wall - TOE RAISES x 20  Unilateral w/ emphasis on ecc lowering   ACHILLES STRETCH OFF STEP (B) x 60 sec - -DEFER TODAY  SLS (L) 2x 1min each leg  BALANCE BOARD Frnt/Bk;  Side / Side, Angled x 2    - 2 min ea      NMR:   Verbal and tactile cues to facilitate  firing of glute medius and quad musculature statically and dynamically . Balance / Proprioception activities -- SEE EXERCISEs  -      Functional / Therapeutic Activities:    TAPING / BRACING: K-Tape to 1) Unload PF Jt x 2 over-lapping layers (R) knee; 2) Unload Infrapatellar region   SEE EXERCISE FLOW SHEET -     Assessment/Plan  64 yo female: S/P (R) TKA   Ambulate well w/o AD - mildly antalgic   Improved stairs -    Flare up of (R) knee due to twisting on Fridays -     Progress strengthening /stabilization /functional activity       _________________________________________________    Manual Therapy:                 mins  53458;  Therapeutic Exercise:    17    mins  90906;   KX  Neuromuscular Ivan:   06     mins  59909;  KX  Therapeutic Activity:     20      mins  83417;  KX  Ultrasound:                          mins  32924;  Iontophoresis:                     mins  92029;    Electrical Stimulation:         mins  98460 ( );  Mechanical Traction:          mins  24103  Dry Needling                       mins self-pay     Timed Treatment:   43    mins                  Total Treatment:     43    mins        Evelio Dumont PT  Physical Therapist  Lic # [id number]

## 2023-09-18 ENCOUNTER — TREATMENT (OUTPATIENT)
Dept: PHYSICAL THERAPY | Facility: CLINIC | Age: 63
End: 2023-09-18
Payer: MEDICARE

## 2023-09-18 DIAGNOSIS — R26.9 GAIT DISTURBANCE: ICD-10-CM

## 2023-09-18 DIAGNOSIS — R29.898 WEAKNESS OF LEFT LOWER EXTREMITY: ICD-10-CM

## 2023-09-18 DIAGNOSIS — Z96.652 S/P TKR (TOTAL KNEE REPLACEMENT), LEFT: Primary | ICD-10-CM

## 2023-09-18 DIAGNOSIS — M25.662 STIFFNESS OF LEFT KNEE: ICD-10-CM

## 2023-09-18 DIAGNOSIS — Z74.09 IMPAIRED FUNCTIONAL MOBILITY, BALANCE, GAIT, AND ENDURANCE: ICD-10-CM

## 2023-09-18 PROCEDURE — 97530 THERAPEUTIC ACTIVITIES: CPT | Performed by: PHYSICAL THERAPIST

## 2023-09-18 PROCEDURE — 97112 NEUROMUSCULAR REEDUCATION: CPT | Performed by: PHYSICAL THERAPIST

## 2023-09-18 PROCEDURE — 97110 THERAPEUTIC EXERCISES: CPT | Performed by: PHYSICAL THERAPIST

## 2023-09-18 NOTE — PROGRESS NOTES
Physical Therapy Daily Note    Patient Name: Alisha Connolly         :  1960  Referring Physician: Hilda Mello APRN  Treatment Date: 2023  Date of Initial Visit: No linked episodes    Visit Diagnoses:    ICD-10-CM ICD-9-CM   1. S/P TKR (total knee replacement), left  Z96.652 V43.65   2. Stiffness of left knee  M25.662 719.56   3. Impaired functional mobility, balance, gait, and endurance  Z74.09 V49.89   4. Weakness of left lower extremity  R29.898 729.89   5. Gait disturbance  R26.9 781.2       _____________________________________________________    Subjective   Alisha Connolly reports: (R) knee calmed down - bothering her after this weekend after going to homecoming in [place] and did a lot of walking - better     Objective   Pt demonstrated improved ambulation down stairs reciprocally w/ (R) knee taped -   Mild swelling in (L) knee and lower leg -     EXERCISES:   NEW OCTANE Seat 5 L7 8 min Fwd/retr ()  Up/down Stairs x 2 flights x 2;  Going up 2 flights of stairs LLE only;    FWD RUNNER STEP - (hole 4 from bottom) (see above)  LATERAL STEP UP w/ ABDuction  (Up/down 2 flights of steps LLE Side stepping up/down)  MATRIX HIP ADDuction 45 2x 15  MATRIC HIP ABDucton 50# 2x 15  MATRIX LEG PRESS 80# 2x 15  (INCREASE WT NEXT TIME) seat 5  STANDING against wall - TOE RAISES x 20  Unilateral w/ emphasis on ecc lowering   ACHILLES STRETCH OFF STEP (B) x 60 sec - -DEFER TODAY  SLS (L) 2x 1min each leg  BALANCE BOARD Frnt/Bk;  Side / Side, Angled x 2    - 2 min ea      NMR:   Verbal and tactile cues to facilitate  firing of glute medius and quad musculature statically and dynamically . Balance / Proprioception activities -- SEE EXERCISEs  -      Functional / Therapeutic Activities:    TAPING / BRACING: K-Tape to 1) Unload PF Jt x 2 over-lapping layers (R) knee; 2) Unload Infrapatellar region   SEE EXERCISE FLOW SHEET -     Assessment/Plan  64 yo female: S/P (R) TKA   Ambulate well w/o AD - mildly antalgic    Improved stairs -   Flare up of (R) knee - taping helpful - much less pain (R) knee going down stairs with knee taped     Progress strengthening /stabilization /functional activity       _________________________________________________    Manual Therapy:                 mins  74152;   KX MODIFIER  Therapeutic Exercise:    15    mins  40463;     Neuromuscular Ivan:   05     mins  52240;    Therapeutic Activity:     20      mins  99924;     Ultrasound:                          mins  16125;  Electrical Stimulation:         mins  92136 ( );     Timed Treatment:   40    mins                  Total Treatment:     40    mins        Evelio Dumont PT  Physical Therapist  Lic # [id number]

## 2023-09-19 NOTE — PATIENT INSTRUCTIONS
Continue HEP  Keep tape on unless skin burns or itches or increases pain.     I just called and spoke with the patient  I will send a H2 blocker as well as something for nausea so that patient can at least eat but I explained that she needs to see if she can get an earlier appointment with bariatric surgery  Can you cancel the virtual appointment on Monday?  Thank you

## 2023-09-20 ENCOUNTER — TREATMENT (OUTPATIENT)
Dept: PHYSICAL THERAPY | Facility: CLINIC | Age: 63
End: 2023-09-20
Payer: MEDICARE

## 2023-09-20 DIAGNOSIS — R26.9 GAIT DISTURBANCE: ICD-10-CM

## 2023-09-20 DIAGNOSIS — Z74.09 IMPAIRED FUNCTIONAL MOBILITY, BALANCE, GAIT, AND ENDURANCE: ICD-10-CM

## 2023-09-20 DIAGNOSIS — M25.662 STIFFNESS OF LEFT KNEE: ICD-10-CM

## 2023-09-20 DIAGNOSIS — Z96.652 S/P TKR (TOTAL KNEE REPLACEMENT), LEFT: Primary | ICD-10-CM

## 2023-09-20 DIAGNOSIS — R29.898 WEAKNESS OF LEFT LOWER EXTREMITY: ICD-10-CM

## 2023-09-20 PROCEDURE — 97530 THERAPEUTIC ACTIVITIES: CPT | Performed by: PHYSICAL THERAPIST

## 2023-09-20 PROCEDURE — 97140 MANUAL THERAPY 1/> REGIONS: CPT | Performed by: PHYSICAL THERAPIST

## 2023-09-20 PROCEDURE — 97110 THERAPEUTIC EXERCISES: CPT | Performed by: PHYSICAL THERAPIST

## 2023-09-20 NOTE — PROGRESS NOTES
Physical Therapy Daily Note    Patient Name: Alisha Connolly         :  1960  Referring Physician: Hilda Mello APRN  Treatment Date: 2023  Date of Initial Visit: Type: THERAPY  Noted: 7/3/2023    Visit Diagnoses:    ICD-10-CM ICD-9-CM   1. S/P TKR (total knee replacement), left  Z96.652 V43.65   2. Stiffness of left knee  M25.662 719.56   3. Impaired functional mobility, balance, gait, and endurance  Z74.09 V49.89   4. Weakness of left lower extremity  R29.898 729.89   5. Gait disturbance  R26.9 781.2       _____________________________________________________    Subjective   Alisha Connolly reports: tape very helpful for (R) knee - Noted some stinging pain anterior (L) knee w/ stairs -    Objective   Scar anterior (L) knee very tight / adhered distal 1/2   AROM: 0-120 after stretch (L)    TREATMENT:   MANUAL THERAPY:  DTM / SCAR Mobilizations distal 1/2 anterior scar  Patellar mobs including lifts -     EXERCISES:   NEW OCTANE Seat 5 L7 8 min Fwd/retr () - DEFER NO TIME  Up/down Stairs x 2 flights x 2;  Going up 2 flights of stairs LLE only; - DEFER NO TIME   BUTTERFLY ADDUCTOR STRETCH 2x 60 sec  SUPINE HF STRETCH off SIDE OF BED R/L  - 2 min ea  FWD RUNNER STEP - (hole 4 from bottom) alternating R/L x 20 ea  LATERAL STEP UP w/ ABDuction  x 15 ea R/L  MATRIX HIP ADDuction 40   2 x 15  MATRIC HIP ABDucton 50#   2x 15  MATRIX LEG PRESS 90# 2x 15  seat 5  STANDING against wall - TOE RAISES x 20  Unilateral w/ emphasis on ecc lowering   ACHILLES STRETCH OFF STEP (B) x 60 sec - -DEFER TODAY  SLS (L) 2x 1min each leg - - DEFER NO TIME  BALANCE BOARD Frnt/Bk;  Side / Side, Angled x 2    - 2 min ea  - DEFER NO TIME     Functional / Therapeutic Activities:    TAPING / BRACING: K-Tape to UNLOAD PF Jt and UNLOAD INFRAPATELLAR REGION (L) KNEE  SEE EXERCISE FLOW SHEET -     Assessment/Plan  64 yo female: S/P (L) TKA  2023  (R) Knee pain / OA / PF Jt pain -   Doing well w/ decreased pain and improved mobility  and function   Anterior (L) knee pain probably due to scar adhesions - better after MT -     Progress strengthening /stabilization /functional activity       _________________________________________________    Manual Therapy:            08     mins  92101;  Therapeutic Exercise:    20    mins  75655;     Neuromuscular Ivan:        mins  70764;    Therapeutic Activity:     15      mins  63357;     Ultrasound:                          mins  93678;  Iontophoresis:                     mins  06554;    Electrical Stimulation:         mins  31411 ( );  Mechanical Traction:          mins  96294  Dry Needling                       mins self-pay     Timed Treatment:   43    mins                  Total Treatment:     43    mins        Evelio Dumont PT  Physical Therapist  Lic # [id number]

## 2023-09-25 ENCOUNTER — OFFICE VISIT (OUTPATIENT)
Dept: SURGERY | Facility: CLINIC | Age: 63
End: 2023-09-25
Payer: MEDICARE

## 2023-09-25 ENCOUNTER — TREATMENT (OUTPATIENT)
Dept: PHYSICAL THERAPY | Facility: CLINIC | Age: 63
End: 2023-09-25
Payer: MEDICARE

## 2023-09-25 VITALS
SYSTOLIC BLOOD PRESSURE: 112 MMHG | DIASTOLIC BLOOD PRESSURE: 64 MMHG | OXYGEN SATURATION: 97 % | BODY MASS INDEX: 36.11 KG/M2 | HEART RATE: 89 BPM | WEIGHT: 203.8 LBS | HEIGHT: 63 IN

## 2023-09-25 DIAGNOSIS — R29.898 WEAKNESS OF LEFT LOWER EXTREMITY: ICD-10-CM

## 2023-09-25 DIAGNOSIS — K64.8 INTERNAL HEMORRHOIDS WITH COMPLICATION: ICD-10-CM

## 2023-09-25 DIAGNOSIS — K60.2 ANAL FISSURE: Primary | ICD-10-CM

## 2023-09-25 DIAGNOSIS — M25.662 STIFFNESS OF LEFT KNEE: ICD-10-CM

## 2023-09-25 DIAGNOSIS — R26.9 GAIT DISTURBANCE: ICD-10-CM

## 2023-09-25 DIAGNOSIS — Z96.652 S/P TKR (TOTAL KNEE REPLACEMENT), LEFT: Primary | ICD-10-CM

## 2023-09-25 DIAGNOSIS — K64.4 EXTERNAL HEMORRHOIDS WITH COMPLICATION: ICD-10-CM

## 2023-09-25 DIAGNOSIS — Z74.09 IMPAIRED FUNCTIONAL MOBILITY, BALANCE, GAIT, AND ENDURANCE: ICD-10-CM

## 2023-09-25 PROCEDURE — 97112 NEUROMUSCULAR REEDUCATION: CPT | Performed by: PHYSICAL THERAPIST

## 2023-09-25 PROCEDURE — 97110 THERAPEUTIC EXERCISES: CPT | Performed by: PHYSICAL THERAPIST

## 2023-09-25 PROCEDURE — 97530 THERAPEUTIC ACTIVITIES: CPT | Performed by: PHYSICAL THERAPIST

## 2023-09-25 RX ORDER — PAROXETINE 10 MG/1
TABLET, FILM COATED ORAL
COMMUNITY
Start: 2023-08-12 | End: 2023-09-25

## 2023-09-25 RX ORDER — CLINDAMYCIN PHOSPHATE 900 MG/50ML
900 INJECTION INTRAVENOUS ONCE
OUTPATIENT
Start: 2023-11-13 | End: 2023-09-25

## 2023-09-25 NOTE — PROGRESS NOTES
Physical Therapy Daily Note    Patient Name: Alisha Connolly         :  1960  Referring Physician: Hilda Mello APRN  Treatment Date: 2023  Date of Initial Visit: No linked episodes    Visit Diagnoses:    ICD-10-CM ICD-9-CM   1. S/P TKR (total knee replacement), left  Z96.652 V43.65   2. Stiffness of left knee  M25.662 719.56   3. Impaired functional mobility, balance, gait, and endurance  Z74.09 V49.89   4. Weakness of left lower extremity  R29.898 729.89   5. Gait disturbance  R26.9 781.2       _____________________________________________________    Subjective   Alisha Connolly reports: did extensive cleaning Saturday - woke up  - sore -   (L) knee popping medial and lateral when going up/down stairs and AROM  (flex/ext) - she can feel it popping and is painful lateral w/ stairs -   (R) knee popping / painful with stairs - -     Objective   Palpable crepitus ant/med joint line (L) knee - Very tender medial < lateral jt line region (L) knee -     EXERCISES:   NEW OCTANE Seat 5 L7 8 min Fwd/retr () - DEFER NO TIME  Up/down Stairs x 2 flights x 2;  Going up 2 flights of stairs LLE only; - DEFER NO TIME   BUTTERFLY ADDUCTOR STRETCH 2x 60 sec  SUPINE HF STRETCH off SIDE OF BED R/L  - 2 min ea  FWD RUNNER STEP - (hole 4 from bottom) alternating R/L x 20 ea  LATERAL STEP UP w/ ABDuction  x 15 ea R/L  MATRIX HIP ADDuction 40   2 x 15  MATRIC HIP ABDucton 50#   2x 15  MATRIX LEG PRESS 90# 2x 15  seat 5  STANDING against wall - TOE RAISES x 20  Unilateral w/ emphasis on ecc lowering   ACHILLES STRETCH OFF STEP (B) x 60 sec - -DEFER TODAY  SLS (L) 2x 1min each leg - - DEFER NO TIME  BALANCE BOARD Frnt/Bk;  Side / Side, Angled x 2    - 2 min ea  - DEFER NO TIME     NMR:   Verbal and tactile cues to facilitate Glute / quad/VMO, LE  musculature statically and dynamically. - Balance / Proprioception activities -      Functional / Therapeutic Activities:    TAPING / BRACING: K-Tape to UNLOAD PF Jt and UNLOAD  INFRAPATELLAR REGION (L) KNEE  (INSTRUCTED Pt IN SELF TAPING - )  Tape to unload ant/lat, ant/med (L) knee -   Jt protection, ADL modification; Posture and      Assessment/Plan  62 yo female: S/P (L) TKA  6/20/2023  (R) Knee pain / OA / PF Jt pain -   Doing well w/ decreased pain and improved mobility and function   Taping helpful in alleviating (R) knee pain -  Onset of / increasing popping/crepitus med/lat joint line (L) knee with stairs and AROM and pain lateral jt line w/ stairs.      Progress strengthening /stabilization /functional activity       _________________________________________________    Manual Therapy:                 mins  00709;  Therapeutic Exercise:    18    mins  21010;   KX MODIFIER  Neuromuscular Ivan:   05     mins  54319;   KX MODIFIER  Therapeutic Activity:     20      mins  26788;   KX MODIFIER  Ultrasound:                          mins  29892;     Timed Treatment:   43    mins                  Total Treatment:     43    mins        Evelio Dumont, PT  Physical Therapist  Lic # [id number]

## 2023-09-25 NOTE — PROGRESS NOTES
Alisha Connolly is a 63 y.o. female in for follow up of Anal fissure    Internal hemorrhoids with complication    External hemorrhoids with complication    HPI:   The patient is status post chemical sphincterotomy with Botox. It was noted back in 02/2023 when that was done that patient had posterior hemorrhoids. She was seen postoperatively and did not want to do any surgical treatment of the hemorrhoids at that time. She has now undergone a left total knee arthroplasty on 06/20/2023 and has been doing physical therapy and rehab.       The patient reports she had a left total knee arthroplasty on 06/20/2023. She states it was the worst thing she has ever had to go through. The patient reports she still has not been released. She states she will see him again the first week of 10/2023. The patient reports she still has physical therapy this week. She states it is supposed to be her last week. The patient reports she cried the first 3 weeks because she was in so much pain almost every day. She states she was put on tramadol, which was a lot better. The patient reports she still has one hemorrhoid that they have been fighting. She states it has gotten big. The patient reports she has been doing ice, showering, and keeping it clean. She states every time she puts something on it, it irritates it more. The patient reports she will get in the shower and let the water run. She states she could not get in the tub with her knee. The patient reports she would get ice to get it to go down. She states she tries to stay off of it. The patient reports there have been some physical therapy dates that she did not want to tell them what was going on, but she would be doing her best. The patient reports she is trying to stay on top of it because she has a wedding coming up the first week that they are traveling to [place]. The patient reports she has a lot going on. She states she is trying to keep it calm. The patient reports she  "thinks it is going to have to come out. She states she drinks Smooth Move almost every night, if she does not, it gets irritated. The patient reports she has to strain more or reach around and help guide it because she feels like it is being blocked. She states she tries not to strain. The patient reports she feels like the Botox wore off on 06/20/2023. The patient reports she has a little bit of tissue that hangs down and she feels like sometimes it gets caught. She states that she has to have her right knee replaced. She is trying to hold off until 05/2024 to have it done.       /64   Pulse 89   Ht 160 cm (63\")   Wt 92.4 kg (203 lb 12.8 oz)   LMP  (LMP Unknown)   SpO2 97%   Breastfeeding No   BMI 36.10 kg/mý   Body mass index is 36.1 kg/mý.      PE:  Physical Exam  Constitutional:       General: She is not in acute distress.     Appearance: She is well-developed.   HENT:      Head: Normocephalic and atraumatic.   Abdominal:      General: There is no distension.      Palpations: Abdomen is soft.   Musculoskeletal:         General: Normal range of motion.   Neurological:      Mental Status: She is alert.   Psychiatric:         Thought Content: Thought content normal.           Assessment:  1. Anal fissure    2. Internal hemorrhoids with complication    3. External hemorrhoids with complication        Plan:  -I advised the patient that we have hemorrhoids in lying in the anal entire canal, even if we remove 1 does not mean another won't do something similar. This hemorrhoid is already large and problematic, we can absolutely remove it and stitch up the area. I explained to her that we are unable to remove every hemorrhoid because they are part of the blood supply to the anus. I explained to her that the surgery is painful. I informed the patient that we will typically put you on some extra stool softeners while she is recovering to keep things on the looser side. I informed the patient that I would be " removing the tag, the external hemorrhoid and plus the internal hemorrhoid. We discussed limitation after surgery. I explained to her that first week of recovery is the worst, I ask that she not travel because it will upset the surgical area. I informed her that she can so light household activities, I ask that she not lift anything greater that 15 to 20 pounds.   -I advised the patient that she can continue drinking Smooth move and add MiraLAX at the opposite time of day. I informed her that some people are taking MiraLAX twice a day. I explained that I prefer that she has diarrhea than to be constipated and have hard bowel movements going past new sutures.   - I explained to the patient that the Botox is to help the sphincter not spasm.   - The patient will be scheduled for chemical sphincterotomy with Botox and hemorrhoidectomy.  -I described with patient typical surgical time, postop recovery including pain management, and restrictions. I discussed with patient risks, benefits, and alternatives. The patient had an opportunity to ask questions. I answered all questions. Patient understands and wishes to proceed with the procedure.      Transcribed from ambient dictation for Sarai Perez MD by Faviola Mares.  09/25/23   14:19 EDT    Patient or patient representative verbalized consent to the visit recording.   This patient was evaluated by me, recommendations made, documentation reviewed, edited, and revised by me, Sarai Perez MD

## 2023-09-27 ENCOUNTER — TREATMENT (OUTPATIENT)
Dept: PHYSICAL THERAPY | Facility: CLINIC | Age: 63
End: 2023-09-27
Payer: MEDICARE

## 2023-09-27 DIAGNOSIS — R29.898 WEAKNESS OF LEFT LOWER EXTREMITY: ICD-10-CM

## 2023-09-27 DIAGNOSIS — Z96.652 S/P TKR (TOTAL KNEE REPLACEMENT), LEFT: Primary | ICD-10-CM

## 2023-09-27 DIAGNOSIS — Z74.09 IMPAIRED FUNCTIONAL MOBILITY, BALANCE, GAIT, AND ENDURANCE: ICD-10-CM

## 2023-09-27 DIAGNOSIS — R26.9 GAIT DISTURBANCE: ICD-10-CM

## 2023-09-27 DIAGNOSIS — M25.662 STIFFNESS OF LEFT KNEE: ICD-10-CM

## 2023-09-27 PROCEDURE — 97530 THERAPEUTIC ACTIVITIES: CPT | Performed by: PHYSICAL THERAPIST

## 2023-09-27 PROCEDURE — 97140 MANUAL THERAPY 1/> REGIONS: CPT | Performed by: PHYSICAL THERAPIST

## 2023-09-27 PROCEDURE — 97110 THERAPEUTIC EXERCISES: CPT | Performed by: PHYSICAL THERAPIST

## 2023-09-27 NOTE — PROGRESS NOTES
Physical Therapy Daily Note    Patient Name: Alisha Connolly         :  1960  Referring Physician: Hilda Mello APRN  Treatment Date: 2023  Date of Initial Visit: Type: THERAPY  Noted: 7/3/2023    Visit Diagnoses:    ICD-10-CM ICD-9-CM   1. S/P TKR (total knee replacement), left  Z96.652 V43.65   2. Stiffness of left knee  M25.662 719.56   3. Impaired functional mobility, balance, gait, and endurance  Z74.09 V49.89   4. Weakness of left lower extremity  R29.898 729.89   5. Gait disturbance  R26.9 781.2       _____________________________________________________    Subjective   Alisha Connolly reports: Tape helpful (R) knee in decreasing her pain on stairs - (L) knee pops w/ AROM and stairs and painful anterior knee and lower part of her (L) knee when going down stairs -   Pt reports significant improvement - feels near normal other than residual stiffness after sitting still and residual anterior knee pain w/ stairs - Feels like she is walking normally -     Objective   Tender / tight distal 1/2 scar (L) knee - Pain anterior/inf knee going down stairs -   AROM 120-0-deg    EXERCISES:   NEW OCTANE Seat 5 L7 8 min Fwd/retr () -  Up/down Stairs x 2 flights x 2;  Going up 2 flights of stairs   BUTTERFLY ADDUCTOR STRETCH 2x 60 sec  SUPINE HF STRETCH off SIDE OF BED R/L  - 2 min ea  FWD RUNNER STEP - (hole 4 from bottom) alternating R/L x 20 ea  LATERAL STEP UP w/ ABDuction  x 15 ea R/L  MATRIX HIP ADDuction 40   2 x 15  MATRIC HIP ABDucton 50#   2x 15  MATRIX LEG PRESS 90# 2x 15  seat 5  STANDING against wall - TOE RAISES x 20  Unilateral w/ emphasis on ecc lowering   ACHILLES STRETCH OFF STEP (B) x 60 sec - -DEFER TODAY  SLS (L) 2x 1min each leg - - DEFER NO TIME  BALANCE BOARD Frnt/Bk;  Side / Side, Angled x 2    - 2 min ea  - DEFER NO TIME     NMR:   Verbal and tactile cues to facilitate Glute / quad/VMO, LE  musculature statically and dynamically. - Balance / Proprioception activities -      Functional  / Therapeutic Activities:    TAPING / BRACING: K-Tape to UNLOAD PF Jt and UNLOAD INFRAPATELLAR REGION (L) KNEE  (INSTRUCTED Pt IN SELF TAPING - )  Tape to unload PF Jt (L) knee -   SEE EXERCISE FLOW SHEET -   Knee ASSESSMENT     Assessment/Plan  62 yo female: S/P (L) TKA  6/20/2023  (R) Knee pain / OA / PF Jt pain -   Doing well w/ decreased pain and improved mobility and function   Taping helpful in alleviating (R) knee pain -and Scar mobs and taping alleviated (L) knee pain and allowed ambulation up/down stairs w/o pain -   GOALS MET -   Alisha would benefit from continuing her HEP and discontinuing formal Physical Therapy a this time -     DC PT to HEP - to call if pain increases -        _________________________________________________    Manual Therapy:            07     mins  84310;  Therapeutic Exercise:    15    mins  82918;     Neuromuscular Ivan:   04     mins  00865;    Therapeutic Activity:     22      mins  12450;     Ultrasound:                          mins  83755;  Iontophoresis:                     mins  73007;    Electrical Stimulation:         mins  37491 ( );  Mechanical Traction:          mins  05492  Dry Needling                       mins self-pay     Timed Treatment:   48    mins                  Total Treatment:     48    mins        Evelio Dumont, PT  Physical Therapist  Lic # [id number]

## 2023-10-04 ENCOUNTER — OFFICE VISIT (OUTPATIENT)
Dept: ORTHOPEDIC SURGERY | Facility: CLINIC | Age: 63
End: 2023-10-04
Payer: MEDICARE

## 2023-10-04 VITALS — WEIGHT: 202.7 LBS | BODY MASS INDEX: 35.91 KG/M2 | TEMPERATURE: 97.3 F | HEIGHT: 63 IN

## 2023-10-04 DIAGNOSIS — Z96.652 S/P TKR (TOTAL KNEE REPLACEMENT), LEFT: Primary | ICD-10-CM

## 2023-10-04 DIAGNOSIS — Z96.652 STATUS POST TOTAL LEFT KNEE REPLACEMENT: ICD-10-CM

## 2023-10-04 RX ORDER — MELOXICAM 7.5 MG/1
7.5 TABLET ORAL DAILY
Qty: 30 TABLET | Refills: 1 | Status: SHIPPED | OUTPATIENT
Start: 2023-10-04

## 2023-10-04 NOTE — PROGRESS NOTES
Alisha is seen back today about 3 months status post her left total knee.  She is doing generally well with that still gets a little soreness with that but she is doing well with her motion etc.  Patient Name: Alisha Connolly   YOB: 1960  Referring Primary Care Physician: Aarti Ogden APRN  BMI: Body mass index is 35.91 kg/m².    Chief Complaint:    Chief Complaint   Patient presents with    Left Knee - Follow-up        HPI:     Alisha Connolly is a 63 y.o. female who presents today for evaluation of   Chief Complaint   Patient presents with    Left Knee - Follow-up   .  See above      Subjective   Medications:   Home Medications:  Current Outpatient Medications on File Prior to Visit   Medication Sig    acetaminophen (TYLENOL) 500 MG tablet Take 1 tablet by mouth Every 6 (Six) Hours As Needed for Mild Pain. Indications: Pain    albuterol sulfate  (90 Base) MCG/ACT inhaler Inhale 2 puffs Every 4 (Four) Hours As Needed. Indications: Asthma    amLODIPine (NORVASC) 5 MG tablet Take 1 tablet by mouth Every Night. Indications: High Blood Pressure Disorder    cloNIDine (CATAPRES) 0.1 MG tablet Take 1 tablet by mouth 2 (Two) Times a Day As Needed for High Blood Pressure (Systolic BP > 180, Diastolic BP > 110).    Lancets (OneTouch Delica Plus Bdoslb23D) misc 1 each by Other route Daily.    metFORMIN (GLUCOPHAGE) 500 MG tablet Take 1 tablet by mouth 2 (Two) Times a Day With Meals.    olmesartan (BENICAR) 40 MG tablet Take 1 tablet by mouth Daily. Indications: High Blood Pressure Disorder    OneTouch Ultra test strip 1 each by Other route Daily.    traMADol (ULTRAM) 50 MG tablet Take 1 tablet by mouth Every 4 (Four) Hours As Needed for Moderate Pain.     No current facility-administered medications on file prior to visit.     Current Medications:  Scheduled Meds:  Continuous Infusions:No current facility-administered medications for this visit.    PRN Meds:.    I have reviewed the patient's medical  history in detail and updated the computerized patient record.  Review and summarization of old records includes:    Past Medical History:   Diagnosis Date    Abnormal Pap smear of cervix     Age-related nuclear cataract of both eyes 02/03/2020    Anal fissure 10/2021    Anal skin tag 08/2022    S/P EXCISION    Ankle fracture 12/2022    RIGHT    Arthrodesis status 03/08/2014    Asthma     Benign essential hypertension 06/05/2015    Continue lisinopril    Carpal tunnel syndrome     PING    Cervical spondylolysis     Chronic neck pain 07/19/2016    Closed nondisplaced fracture of lateral malleolus of right fibula 12/10/2022    NO SURGERY, SEEN AT [facility]    Cystitis 11/2017    Dry eye syndrome 02/03/2020    Environmental allergies     Exogenous hypertriglyceridemia 06/05/2017    Ganglion cyst of finger 01/2020    Hemorrhoid     Hemorrhoids     History of COVID-19 05/2022    History of torn meniscus of left knee     Hormone replacement therapy (HRT)     Hyperchylomicronemia     Hyperlipidemia 06/05/2015    ASCVD 10-year risk ~10% Plan: · Start statin. Unclear whether arm numbness really 2/2 statin in the past.    Hyperopia of both eyes 02/03/2020    Keratoconjunctivitis sicca 02/03/2020    Knee pain, left     Lichen sclerosus     Lung nodule 06/01/2006    Obstructive sleep apnea syndrome 11/01/2004    Not using CPAP due to eye issues    Palpitations     Pertussis 02/08/2016    [facility]    PNA (pneumonia)     PONV (postoperative nausea and vomiting)     Presbyopia 02/03/2020    Rotator cuff injury 12/04/2011    SEEN AT [facility] ER    Seasonal allergies     Slow to wake up after anesthesia     Snoring     Type 2 diabetes mellitus without complication 12/21/2015    Under good control Plan: · Reassured about use of metformin    Urinary urgency 06/2021        Past Surgical History:   Procedure Laterality Date    ANTERIOR CERVICAL DISCECTOMY W/ FUSION N/A 03/08/2014    C6-7, DR. ALEXEY IZAGUIRRE AT [facility]. TITANIUM PLATE     ANUS SURGERY N/A 2022    Procedure: ANAL TAG EXCISION;  Surgeon: Sarai Santiago MD;  Location: [facility] ENDOSCOPY;  Service: General;  Laterality: N/A;  pre-anal tag  post-same    CARPAL TUNNEL RELEASE Bilateral      SECTION      COLONOSCOPY N/A     COLONOSCOPY N/A 2022    EXTERNAL AND INTERNAL HEMORRHOIDS, GRADE 3-BANDED, RESCOPE IN 10 YRS, DR. SARAI SANTIAGO AT [facility]    HEMORRHOIDECTOMY N/A 2022    Procedure: HEMORRHOID BANDING x3;  Surgeon: Sarai Santiago MD;  Location: [facility] ENDOSCOPY;  Service: General;  Laterality: N/A;  pre-hemorrhoids  post-same    HYSTERECTOMY N/A     WITH BSO, FOR HEAVY BLEEDING    INCISIONAL BREAST BIOPSY Right     BENIGN    LAPAROSCOPIC LYSIS OF ADHESIONS N/A     OOPHORECTOMY      OOPHORECTOMY      SPHINCTEROTOMY N/A 2023    Procedure: EXAM UNDER ANESTHESIA, CHEMICAL SPHINCTEROTOMY WITH BOTOX;  Surgeon: Sarai Santiago MD;  Location: [facility] MAIN OR;  Service: General;  Laterality: N/A;    TOTAL KNEE ARTHROPLASTY Left 2023    Procedure: LEFT TOTAL KNEE ARTHROPLASTY WITH JACE NAVIGATION;  Surgeon: Tmoás Rasmussen MD;  Location: [facility] OR [facility];  Service: Orthopedics;  Laterality: Left;        Social History     Occupational History    Not on file   Tobacco Use    Smoking status: Former     Packs/day: 0.25     Years: 4.50     Pack years: 1.13     Types: Cigarettes     Quit date: 3/7/2014     Years since quittin.5     Passive exposure: Past    Smokeless tobacco: Never   Vaping Use    Vaping Use: Never used   Substance and Sexual Activity    Alcohol use: Yes     Comment: occ    Drug use: Never    Sexual activity: Defer     Partners: Male     Birth control/protection: Post-menopausal, Hysterectomy      Social History     Social History Narrative    Not on file        Family History   Problem Relation Age of Onset    Arthritis Mother     Heart disease Mother     Arrhythmia Mother     Hypertension Father     Heart attack Father      "Hyperlipidemia Father     Diabetes Father     Heart disease Father     Coronary artery disease Father     Diabetes Sister     Diabetes Paternal Grandmother     Cancer Cousin     Breast cancer Cousin     Cancer Maternal Great-Grandmother     Ovarian cancer Maternal Great-Grandmother     Malig Hyperthermia Neg Hx        ROS: 14 point review of systems was performed and all other systems were reviewed and are negative except for documented findings in HPI and today's encounter.     Allergies:   Allergies   Allergen Reactions    Penicillins Hives    Atorvastatin Paresthesia     Arm numbness            Rosuvastatin Paresthesia     Arm numbness      Latex Rash    Tetanus Toxoid, Adsorbed Rash     Constitutional:  Denies fever, shaking or chills   Eyes:  Denies change in visual acuity   HENT:  Denies nasal congestion or sore throat   Respiratory:  Denies cough or shortness of breath   Cardiovascular:  Denies chest pain or severe LE edema   GI:  Denies abdominal pain, nausea, vomiting, bloody stools or diarrhea   Musculoskeletal:  Numbness, tingling, pain, or loss of motor function only as noted above in history of present illness.  : Denies painful urination or hematuria  Integument:  Denies rash, lesion or ulceration   Neurologic:  Denies headache or focal weakness  Endocrine:  Denies lymphadenopathy  Psych:  Denies confusion or change in mental status   Hem:  Denies active bleeding    OBJECTIVE:  Physical Exam: 63 y.o. female  Wt Readings from Last 3 Encounters:   10/04/23 91.9 kg (202 lb 11.2 oz)   09/25/23 92.4 kg (203 lb 12.8 oz)   08/03/23 90.8 kg (200 lb 1.6 oz)     Ht Readings from Last 1 Encounters:   10/04/23 160 cm (63\")     Body mass index is 35.91 kg/m².  Vitals:    10/04/23 1030   Temp: 97.3 °F (36.3 °C)     Vital signs reviewed.     General Appearance:    Alert, cooperative, in no acute distress                  Eyes: conjunctiva clear  ENT: external ears and nose atraumatic  CV: no peripheral edema  Resp: " normal respiratory effort  Skin: no rashes or wounds; normal turgor  Psych: mood and affect appropriate  Lymph: no nodes appreciated  Neuro: gross sensation intact  Vascular:  Palpable peripheral pulse in noted extremity  Musculoskeletal Extremities: Exam today shows she goes from essentially 0-118 and she was measured in therapy incisions healed nicely her calf is soft and she transfers and walks well    Radiology:   AP lateral sunrise view left knee taken the office today for postop follow-up with comparison view shows well aligned total knee        Assessment:     ICD-10-CM ICD-9-CM   1. S/P TKR (total knee replacement), left  Z96.652 V43.65   2. Status post total left knee replacement  Z96.652 V43.65        MDM/Plan:   The diagnosis(es), natural history, pathophysiology and treatment for diagnosis(es) were discussed. Opportunity given and questions answered.  Biomechanics of pertinent body areas discussed.  When appropriate, the use of ambulatory aids discussed.    TOTAL JOINT recommendations and precautions, including antibiotic prophylaxis discussed. Advice given and questions answered.       10/4/2023    Dictated utilizing Dragon dictation

## 2023-10-23 PROBLEM — K64.4 EXTERNAL HEMORRHOIDS WITH COMPLICATION: Status: ACTIVE | Noted: 2023-09-25

## 2023-10-24 ENCOUNTER — LAB (OUTPATIENT)
Dept: LAB | Facility: HOSPITAL | Age: 63
End: 2023-10-24
Payer: MEDICARE

## 2023-10-24 DIAGNOSIS — E11.9 TYPE 2 DIABETES MELLITUS WITHOUT COMPLICATION, WITHOUT LONG-TERM CURRENT USE OF INSULIN: ICD-10-CM

## 2023-10-24 LAB — HBA1C MFR BLD: 6.1 % (ref 4.8–5.6)

## 2023-10-24 PROCEDURE — 80061 LIPID PANEL: CPT | Performed by: INTERNAL MEDICINE

## 2023-10-24 PROCEDURE — 83036 HEMOGLOBIN GLYCOSYLATED A1C: CPT

## 2023-10-24 PROCEDURE — 80076 HEPATIC FUNCTION PANEL: CPT | Performed by: INTERNAL MEDICINE

## 2023-10-26 DIAGNOSIS — E11.9 TYPE 2 DIABETES MELLITUS WITHOUT COMPLICATION, WITHOUT LONG-TERM CURRENT USE OF INSULIN: Chronic | ICD-10-CM

## 2023-10-26 NOTE — TELEPHONE ENCOUNTER
Rx Refill Note  Requested Prescriptions     Pending Prescriptions Disp Refills    metFORMIN (GLUCOPHAGE) 500 MG tablet [Pharmacy Med Name: METFORMIN 500MG TABLETS] 180 tablet 1     Sig: TAKE 1 TABLET BY MOUTH TWICE DAILY WITH MEALS      Last office visit with prescribing clinician: 6/8/2023   Last telemedicine visit with prescribing clinician: 8/10/2023   Next office visit with prescribing clinician: Visit date not found                         Would you like a call back once the refill request has been completed: [] Yes [] No    If the office needs to give you a call back, can they leave a voicemail: [] Yes [] No    Killian Drake MA  10/26/23, 11:10 EDT

## 2023-10-27 ENCOUNTER — OFFICE VISIT (OUTPATIENT)
Dept: CARDIOLOGY | Facility: CLINIC | Age: 63
End: 2023-10-27
Payer: MEDICARE

## 2023-10-27 VITALS
HEIGHT: 63 IN | SYSTOLIC BLOOD PRESSURE: 140 MMHG | DIASTOLIC BLOOD PRESSURE: 76 MMHG | WEIGHT: 203.4 LBS | HEART RATE: 84 BPM | BODY MASS INDEX: 36.04 KG/M2

## 2023-10-27 DIAGNOSIS — R00.2 PALPITATIONS: ICD-10-CM

## 2023-10-27 DIAGNOSIS — R94.31 ABNORMAL ELECTROCARDIOGRAM (ECG) (EKG): ICD-10-CM

## 2023-10-27 DIAGNOSIS — E78.5 HYPERLIPIDEMIA LDL GOAL <70: ICD-10-CM

## 2023-10-27 DIAGNOSIS — G47.33 OSA (OBSTRUCTIVE SLEEP APNEA): ICD-10-CM

## 2023-10-27 DIAGNOSIS — Z01.818 PREOPERATIVE CLEARANCE: Primary | ICD-10-CM

## 2023-10-27 DIAGNOSIS — I10 ESSENTIAL HYPERTENSION: ICD-10-CM

## 2023-10-27 PROCEDURE — 1159F MED LIST DOCD IN RCRD: CPT | Performed by: NURSE PRACTITIONER

## 2023-10-27 PROCEDURE — 3077F SYST BP >= 140 MM HG: CPT | Performed by: NURSE PRACTITIONER

## 2023-10-27 PROCEDURE — 3078F DIAST BP <80 MM HG: CPT | Performed by: NURSE PRACTITIONER

## 2023-10-27 PROCEDURE — 1160F RVW MEDS BY RX/DR IN RCRD: CPT | Performed by: NURSE PRACTITIONER

## 2023-10-27 PROCEDURE — 99214 OFFICE O/P EST MOD 30 MIN: CPT | Performed by: NURSE PRACTITIONER

## 2023-10-27 PROCEDURE — 93000 ELECTROCARDIOGRAM COMPLETE: CPT | Performed by: NURSE PRACTITIONER

## 2023-10-27 NOTE — PATIENT INSTRUCTIONS
Cholesterol medications to discuss with PCP:    Zetia/fenofibrate  Ej  Praluent    Sleep Medicine [phone]

## 2023-10-27 NOTE — PROGRESS NOTES
Date of Office Visit: 10/27/2023  Encounter Provider: KANWAL Palma  Place of Service: [facility] CARDIOLOGY  Patient Name: Alisha Connolly  :1960    Chief complaint  palpitations     History of Present Illness  Patient is a 63-year-old female patient of Dr. Beaver.  Past medical history includes asthma, diabetes, obstructive sleep apnea (untreated) hyperlipidemia with history of heart murmur.   Her mother had arrhythmia and a pacemaker father had myocardial infarction at age 51 and  at age 51 with sudden death.   She recounts that several weeks prior to her initial visit blood pressure was elevated and lisinopril was switched to amlodipine and Benicar.  However on 2023 she went [place] an outlet mall and forgot to take antihypertensives.  On 2023 she was seen at the gynecologist and blood pressure was noted to be slightly elevated.  She was also started on estradiol.  2023 she was seen in the emergency room for hypertension despite use of usual antihypertensive agents.  She had associated with headache and palpitations.  She was given clonidine to use as needed with follow-up with PCP.  However she was seen the next day at [facility] and was told to go back to the emergency room due to hypertension.  Blood pressure was 177/82 pulse was 85 bpm.  High-sensitivity troponin was 16/18 and glucose slightly elevated.  Otherwise labs unremarkable.  Chest x-ray suggested cardiomegaly otherwise unremarkable.  She is now referred here for further evaluation.  Echocardiogram 2023 showed LVEF 61.5%, grade 1 diastolic dysfunction, trace tricuspid regurgitation with normal right-sided pressures.  Perfusion stress test was negative for ischemia and considered low risk.    Interval history  Patient presents today for routine follow-up.  I will visit with her for the first time today and have reviewed her medical record.  Since last visit she successfully underwent total  knee replacement in September 2023.  She has recovered well and completed physical therapy.  She denies palpitations, shortness of breath, edema, dizziness, chest pain or chest pressure, fatigue, syncope or presyncope.  She continues to exercise regularly and denies exertional symptoms.  Blood pressure today is above goal.  She is maintained on amlodipine 5 mg daily and Benicar 40 mg daily.  She also has as needed clonidine 0.1 mg twice daily for SBP greater than 180 or DBP greater than 110. She states that she has not been taking amlodipine regularly due to feeling it increases her urination at night. She has not had to take clonidine recently. She has plans for upcoming colorectal surgery with Dr. Sarai Perez for hemorrhoid removal and botox injections.    Past Medical History:   Diagnosis Date    Abnormal Pap smear of cervix     Age-related nuclear cataract of both eyes 02/03/2020    Anal fissure 10/2021    Anal skin tag 08/2022    S/P EXCISION    Ankle fracture 12/2022    RIGHT    Arthrodesis status 03/08/2014    Asthma     Benign essential hypertension 06/05/2015    Continue lisinopril    Carpal tunnel syndrome     PING    Cervical spondylolysis     Chronic neck pain 07/19/2016    Closed nondisplaced fracture of lateral malleolus of right fibula 12/10/2022    NO SURGERY, SEEN AT [facility]    Cystitis 11/2017    Dry eye syndrome 02/03/2020    Environmental allergies     Exogenous hypertriglyceridemia 06/05/2017    Ganglion cyst of finger 01/2020    Hemorrhoid     Hemorrhoids     History of COVID-19 05/2022    History of torn meniscus of left knee     Hormone replacement therapy (HRT)     Hyperchylomicronemia     Hyperlipidemia 06/05/2015    ASCVD 10-year risk ~10% Plan: · Start statin. Unclear whether arm numbness really 2/2 statin in the past.    Hyperopia of both eyes 02/03/2020    Keratoconjunctivitis sicca 02/03/2020    Knee pain, left     Lichen sclerosus     Lung nodule 06/01/2006    Obstructive sleep apnea  syndrome 2004    Not using CPAP due to eye issues    Palpitations     Pertussis 2016    [facility]    PNA (pneumonia)     PONV (postoperative nausea and vomiting)     Presbyopia 2020    Rotator cuff injury 2011    SEEN AT [facility] ER    Seasonal allergies     Slow to wake up after anesthesia     Snoring     Type 2 diabetes mellitus without complication 2015    Under good control Plan: · Reassured about use of metformin    Urinary urgency 2021     Past Surgical History:   Procedure Laterality Date    ANTERIOR CERVICAL DISCECTOMY W/ FUSION N/A 2014    C6-7, DR. ALEXEY IZAGUIRRE AT [facility]. TITANIUM PLATE    ANUS SURGERY N/A 2022    Procedure: ANAL TAG EXCISION;  Surgeon: Bradley Santiago MD;  Location:  [facility];  Service: General;  Laterality: N/A;  pre-anal tag  post-same    CARPAL TUNNEL RELEASE Bilateral      SECTION      COLONOSCOPY N/A     COLONOSCOPY N/A 2022    EXTERNAL AND INTERNAL HEMORRHOIDS, GRADE 3-BANDED, RESCOPE IN 10 YRS, DR. BRADLEY SANTIAGO AT [facility]    HEMORRHOIDECTOMY N/A 2022    Procedure: HEMORRHOID BANDING x3;  Surgeon: Bradley Santiago MD;  Location:  [facility];  Service: General;  Laterality: N/A;  pre-hemorrhoids  post-same    HYSTERECTOMY N/A     WITH BSO, FOR HEAVY BLEEDING    INCISIONAL BREAST BIOPSY Right     BENIGN    LAPAROSCOPIC LYSIS OF ADHESIONS N/A     OOPHORECTOMY      OOPHORECTOMY      SPHINCTEROTOMY N/A 2023    Procedure: EXAM UNDER ANESTHESIA, CHEMICAL SPHINCTEROTOMY WITH BOTOX;  Surgeon: Bradley Santiago MD;  Location: [facility];  Service: General;  Laterality: N/A;    TOTAL KNEE ARTHROPLASTY Left 2023    Procedure: LEFT TOTAL KNEE ARTHROPLASTY WITH JACE NAVIGATION;  Surgeon: Tomás Rasmussen MD;  Location: [facility];  Service: Orthopedics;  Laterality: Left;     Outpatient Medications Prior to Visit   Medication Sig Dispense Refill    acetaminophen (TYLENOL) 500 MG tablet  Take 1 tablet by mouth Every 6 (Six) Hours As Needed for Mild Pain. Indications: Pain      albuterol sulfate  (90 Base) MCG/ACT inhaler Inhale 2 puffs Every 4 (Four) Hours As Needed. Indications: Asthma      amLODIPine (NORVASC) 5 MG tablet Take 1 tablet by mouth Every Night. Indications: High Blood Pressure Disorder 90 tablet 1    cloNIDine (CATAPRES) 0.1 MG tablet Take 1 tablet by mouth 2 (Two) Times a Day As Needed for High Blood Pressure (Systolic BP > 180, Diastolic BP > 110). 15 tablet 0    Lancets (OneTouch Delica Plus [id number]) misc 1 each by Other route Daily.      metFORMIN (GLUCOPHAGE) 500 MG tablet TAKE 1 TABLET BY MOUTH TWICE DAILY WITH MEALS 180 tablet 1    olmesartan (BENICAR) 40 MG tablet Take 1 tablet by mouth Daily. Indications: High Blood Pressure Disorder 90 tablet 1    OneTouch Ultra test strip 1 each by Other route Daily.      meloxicam (Mobic) 7.5 MG tablet Take 1 tablet by mouth Daily. (Patient not taking: Reported on 10/27/2023) 30 tablet 1    traMADol (ULTRAM) 50 MG tablet Take 1 tablet by mouth Every 4 (Four) Hours As Needed for Moderate Pain. (Patient not taking: Reported on 10/27/2023) 60 tablet 0     No facility-administered medications prior to visit.       Allergies as of 10/27/2023 - Reviewed 10/27/2023   Allergen Reaction Noted    Penicillins Hives 12/10/1996    Atorvastatin Paresthesia 2018    Rosuvastatin Paresthesia 2005    Latex Rash 2021    Tetanus toxoid, adsorbed Rash 12/10/1996     Social History     Socioeconomic History    Marital status:    Tobacco Use    Smoking status: Former     Packs/day: 0.25     Years: 4.50     Additional pack years: 0.00     Total pack years: 1.13     Types: Cigarettes     Quit date: 3/7/2014     Years since quittin.6     Passive exposure: Past    Smokeless tobacco: Never   Vaping Use    Vaping Use: Never used   Substance and Sexual Activity    Alcohol use: Yes     Comment: occ    Drug use: Never    Sexual  "activity: Defer     Partners: Male     Birth control/protection: Post-menopausal, Hysterectomy     Family History   Problem Relation Age of Onset    Arthritis Mother     Heart disease Mother     Arrhythmia Mother     Hypertension Father     Heart attack Father     Hyperlipidemia Father     Diabetes Father     Heart disease Father     Coronary artery disease Father     Diabetes Sister     Diabetes Paternal Grandmother     Cancer Cousin     Breast cancer Cousin     Cancer Maternal Great-Grandmother     Ovarian cancer Maternal Great-Grandmother     Malig Hyperthermia Neg Hx      Review of Systems   Constitutional: Negative for malaise/fatigue.   Cardiovascular:  Negative for chest pain, claudication, dyspnea on exertion, leg swelling, near-syncope, orthopnea, palpitations, paroxysmal nocturnal dyspnea and syncope.   Respiratory:  Negative for shortness of breath.    Neurological:  Negative for brief paralysis, dizziness, headaches and light-headedness.   All other systems reviewed and are negative.       Objective:     Vitals:    10/27/23 1030   BP: 140/76   BP Location: Left arm   Patient Position: Sitting   Cuff Size: Adult   Pulse: 84   Weight: 92.3 kg (203 lb 6.4 oz)   Height: 160 cm (63\")     Body mass index is 36.03 kg/m².    Vitals reviewed.   Constitutional:       General: Not in acute distress.     Appearance: Well-developed and not in distress. Not diaphoretic.   HENT:      Head: Normocephalic.   Pulmonary:      Effort: Pulmonary effort is normal. No respiratory distress.      Breath sounds: Normal breath sounds. No wheezing. No rhonchi. No rales.   Cardiovascular:      Normal rate. Regular rhythm.      Murmurs: There is no murmur.   Pulses:     Radial: 2+ bilaterally.  Edema:     Peripheral edema absent.   Skin:     General: Skin is warm and dry. There is no cyanosis.      Findings: No rash.   Neurological:      Mental Status: Alert and oriented to person, place, and time.   Psychiatric:         Behavior: " "Behavior normal. Behavior is cooperative.         Thought Content: Thought content normal.         Judgment: Judgment normal.       Lab Review:     Lab Results   Component Value Date     06/21/2023     06/07/2023    K 5.1 06/21/2023    K 4.4 06/07/2023     06/21/2023     06/07/2023    CO2 22.1 06/21/2023    CO2 25.0 06/07/2023    BUN 18 06/21/2023    BUN 16 06/07/2023    CREATININE 0.84 06/21/2023    CREATININE 0.89 06/07/2023    GLUCOSE 130 (H) 06/21/2023    GLUCOSE 120 (H) 06/07/2023    CALCIUM 8.8 06/21/2023    CALCIUM 9.5 06/07/2023    PROTENTOTREF 6.6 11/22/2022    ALBUMIN 4.4 10/24/2023    ALBUMIN 4.3 06/07/2023    BILITOT 0.7 10/24/2023    BILITOT 0.8 06/07/2023    AST 21 10/24/2023    AST 21 06/07/2023    ALT 24 10/24/2023    ALT 21 06/07/2023     Lab Results   Component Value Date    WBC 6.98 06/07/2023    WBC 8.23 06/06/2023    HGB 11.2 (L) 06/21/2023    HGB 13.0 06/07/2023    HCT 33.2 (L) 06/21/2023    HCT 39.1 06/07/2023    MCV 90.1 06/07/2023    MCV 90.5 06/06/2023     06/07/2023     06/06/2023     No results found for: \"PROBNP\", \"BNP\"  Lab Results   Component Value Date    CKTOTAL 96 01/31/2020    TROPONINT 18 (H) 06/07/2023     Lab Results   Component Value Date    TSH 2.100 06/08/2023    TSH 2.150 11/22/2022      Lipid Panel          11/22/2022    11:53 5/1/2023    11:52 10/24/2023    13:05   Lipid Panel   Total Cholesterol   240    Total Cholesterol 242  242     Triglycerides 218  206  218    HDL Cholesterol 64  60  58    VLDL Cholesterol 39  37  39    LDL Cholesterol  139  145  143    LDL/HDL Ratio 2.10  2.35  2.39           ECG 12 Lead    Date/Time: 10/27/2023 10:58 AM  Performed by: Haylee Herron APRN    Authorized by: Haylee Herron APRN  Comparison: compared with previous ECG   Similar to previous ECG  Rhythm: sinus rhythm  Rate: normal  BPM: 84  QRS axis: normal  Other findings: low voltage and T wave abnormality  Comments: Similar to prior. "         Assessment:       Diagnosis Plan   1. Preoperative clearance  ECG 12 Lead      2. Hyperlipidemia LDL goal <70  ECG 12 Lead      3. Abnormal electrocardiogram (ECG) (EKG)  ECG 12 Lead      4. Palpitations  ECG 12 Lead      5. Essential hypertension  ECG 12 Lead      6. RAMU (obstructive sleep apnea)  ECG 12 Lead        Plan:       1.  Borderline elevated troponin.  She does not have clear anginal symptoms.  I suspect was due to hypertension or she may have chronically elevated troponins.  Lexiscan Cardiolite stress test was negative for ischemia.    2.  Hypertension as above.  For now continue with the current regimen though recommended she also consider treatment for sleep apnea. I asked her to try moving amlodipine to AM dosing and olmesartan to PM to see if this helps her urinary frequency. If BP does not improve with consistent amlodipine dosing, may need to consider increasing amlodipine dose or addition of metoprolol.  3.  Hyperlipidemia.  It appears she was on Lipitor on 2 occasions and did not tolerate either.  At last visit, rosuvastatin was initiated, but she did not tolerate this either. Discussed zetia, fenofibrate, Repatha and Praluent with her today and she will discuss these medications with PCP.  4.  Untreated sleep apnea as above we will refer to sleep medicine. Gave her the number to reschedule. Discussed consequences of untreated sleep apnea such as arrhythmia, MI, stroke, and resistant hypertension.      Time Spent: I spent 30 minutes caring for Alisha on this date of service. This time includes time spent by me in the following activities: preparing for the visit, reviewing tests, performing a medically appropriate examination and/or evaluation, counseling and educating the patient/family/caregiver, ordering medications, tests, or procedures, documenting information in the medical record, and independently interpreting results and communicating that information with the  patient/family/caregiver.   I spent 1 minutes on the separately reported service of ECG. This time is not included in the time used to support the E/M service also reported today.        Your medication list            Accurate as of October 27, 2023 11:40 AM. If you have any questions, ask your nurse or doctor.                CONTINUE taking these medications        Instructions Last Dose Given Next Dose Due   acetaminophen 500 MG tablet  Commonly known as: TYLENOL      Take 1 tablet by mouth Every 6 (Six) Hours As Needed for Mild Pain. Indications: Pain       albuterol sulfate  (90 Base) MCG/ACT inhaler  Commonly known as: PROVENTIL HFA;VENTOLIN HFA;PROAIR HFA      Inhale 2 puffs Every 4 (Four) Hours As Needed. Indications: Asthma       amLODIPine 5 MG tablet  Commonly known as: NORVASC      Take 1 tablet by mouth Every Night. Indications: High Blood Pressure Disorder       cloNIDine 0.1 MG tablet  Commonly known as: CATAPRES      Take 1 tablet by mouth 2 (Two) Times a Day As Needed for High Blood Pressure (Systolic BP > 180, Diastolic BP > 110).       metFORMIN 500 MG tablet  Commonly known as: GLUCOPHAGE      TAKE 1 TABLET BY MOUTH TWICE DAILY WITH MEALS       olmesartan 40 MG tablet  Commonly known as: BENICAR      Take 1 tablet by mouth Daily. Indications: High Blood Pressure Disorder       OneTouch Delica Plus [id number] misc      1 each by Other route Daily.       OneTouch Ultra test strip  Generic drug: glucose blood      1 each by Other route Daily.                Patient is no longer taking -.  I corrected the med list to reflect this.  I did not stop these medications.      Dictated utilizing Dragon dictation

## 2023-11-02 ENCOUNTER — APPOINTMENT (OUTPATIENT)
Dept: WOMENS IMAGING | Facility: HOSPITAL | Age: 63
End: 2023-11-02
Payer: MEDICARE

## 2023-11-02 PROCEDURE — 77067 SCR MAMMO BI INCL CAD: CPT | Performed by: RADIOLOGY

## 2023-11-02 PROCEDURE — 77063 BREAST TOMOSYNTHESIS BI: CPT | Performed by: RADIOLOGY

## 2023-11-03 ENCOUNTER — PRE-ADMISSION TESTING (OUTPATIENT)
Dept: PREADMISSION TESTING | Facility: HOSPITAL | Age: 63
End: 2023-11-03
Payer: MEDICARE

## 2023-11-03 VITALS
SYSTOLIC BLOOD PRESSURE: 148 MMHG | DIASTOLIC BLOOD PRESSURE: 74 MMHG | HEIGHT: 63 IN | TEMPERATURE: 97.6 F | RESPIRATION RATE: 16 BRPM | HEART RATE: 81 BPM | BODY MASS INDEX: 36.2 KG/M2 | WEIGHT: 204.3 LBS | OXYGEN SATURATION: 98 %

## 2023-11-03 DIAGNOSIS — K60.2 ANAL FISSURE: ICD-10-CM

## 2023-11-03 DIAGNOSIS — K64.4 EXTERNAL HEMORRHOIDS WITH COMPLICATION: ICD-10-CM

## 2023-11-03 DIAGNOSIS — K64.8 INTERNAL HEMORRHOIDS WITH COMPLICATION: ICD-10-CM

## 2023-11-03 LAB
ANION GAP SERPL CALCULATED.3IONS-SCNC: 10.7 MMOL/L (ref 5–15)
BUN SERPL-MCNC: 13 MG/DL (ref 8–23)
BUN/CREAT SERPL: 14.1 (ref 7–25)
CALCIUM SPEC-SCNC: 9.4 MG/DL (ref 8.6–10.5)
CHLORIDE SERPL-SCNC: 106 MMOL/L (ref 98–107)
CO2 SERPL-SCNC: 22.3 MMOL/L (ref 22–29)
CREAT SERPL-MCNC: 0.92 MG/DL (ref 0.57–1)
DEPRECATED RDW RBC AUTO: 45.1 FL (ref 37–54)
EGFRCR SERPLBLD CKD-EPI 2021: 69.2 ML/MIN/1.73
ERYTHROCYTE [DISTWIDTH] IN BLOOD BY AUTOMATED COUNT: 13.9 % (ref 12.3–15.4)
GLUCOSE SERPL-MCNC: 160 MG/DL (ref 65–99)
HCT VFR BLD AUTO: 35.8 % (ref 34–46.6)
HGB BLD-MCNC: 11.9 G/DL (ref 12–15.9)
MCH RBC QN AUTO: 29.6 PG (ref 26.6–33)
MCHC RBC AUTO-ENTMCNC: 33.2 G/DL (ref 31.5–35.7)
MCV RBC AUTO: 89.1 FL (ref 79–97)
PLATELET # BLD AUTO: 309 10*3/MM3 (ref 140–450)
PMV BLD AUTO: 10.2 FL (ref 6–12)
POTASSIUM SERPL-SCNC: 4.3 MMOL/L (ref 3.5–5.2)
RBC # BLD AUTO: 4.02 10*6/MM3 (ref 3.77–5.28)
SODIUM SERPL-SCNC: 139 MMOL/L (ref 136–145)
WBC NRBC COR # BLD: 6.14 10*3/MM3 (ref 3.4–10.8)

## 2023-11-03 PROCEDURE — 80048 BASIC METABOLIC PNL TOTAL CA: CPT

## 2023-11-03 PROCEDURE — 36415 COLL VENOUS BLD VENIPUNCTURE: CPT

## 2023-11-03 PROCEDURE — 85027 COMPLETE CBC AUTOMATED: CPT

## 2023-11-03 NOTE — DISCHARGE INSTRUCTIONS
Take the following medications the morning of surgery:    CLONIDINE ONLY IF NEEDED    If you are on prescription narcotic pain medication to control your pain you may also take that medication the morning of surgery.    General Instructions:  Do not eat solid food after midnight the night before surgery.  You may drink clear liquids day of surgery but must stop at least one hour before your hospital arrival time.  It is beneficial for you to have a clear drink that contains carbohydrates the day of surgery.  We suggest  a 12 to 20 ounce bottle of G2 or Powerade Zero for diabetic patients.     Clear liquids are liquids you can see through.  Nothing red in color.     Plain water                               Sports drinks  Sodas                                   Gelatin (Jell-O)  Fruit juices without pulp such as white grape juice and apple juice  Popsicles that contain no fruit or yogurt  Tea or coffee (no cream or milk added)  Gatorade / Powerade  G2 / Powerade Zero    I  If applicable bring your C-PAP   machine in with you to preop day of surgery.  Bring any papers given to you in the doctor’s office.  Wear clean comfortable clothes.  Do not wear contact lenses, false eyelashes or make-up.  Bring a case for your glasses.   Remove all piercings.  Leave jewelry and any other valuables at home.  Hair extensions with metal clips must be removed prior to surgery.  The Pre-Admission Testing nurse will instruct you to bring medications if unable to obtain an accurate list in Pre-Admission Testing.   REPORT TO SURGERY ENTRANCE ON 11-13-23 AT 1230           Preventing a Surgical Site Infection:  For 2 to 3 days before surgery, avoid shaving with a razor because the razor can irritate skin and make it easier to develop an infection.    Any areas of open skin can increase the risk of a post-operative wound infection by allowing bacteria to enter and travel throughout the body.  Notify your surgeon if you have any skin wounds  / rashes even if it is not near the expected surgical site.  The area will need assessed to determine if surgery should be delayed until it is healed.  The night prior to surgery shower using a fresh bar of anti-bacterial soap (such as Dial) and clean washcloth.  Sleep in a clean bed with clean clothing.  Do not allow pets to sleep with you.  Shower on the morning of surgery using a fresh bar of anti-bacterial soap (such as Dial) and clean washcloth.  Dry with a clean towel and dress in clean clothing.  Ask your surgeon if you will be receiving antibiotics prior to surgery.  Make sure you, your family, and all healthcare providers clean their hands with soap and water or an alcohol based hand  before caring for you or your wound.    Day of surgery:  Your arrival time is approximately two hours before your scheduled surgery time.  Upon arrival, a Pre-op nurse and Anesthesiologist will review your health history, obtain vital signs, and answer questions you may have.  The only belongings needed at this time will be a list of your home medications and if applicable your C-PAP/BI-PAP machine.  A Pre-op nurse will start an IV and you may receive medication in preparation for surgery, including something to help you relax.     Please be aware that surgery does come with discomfort.  We want to make every effort to control your discomfort so please discuss any uncontrolled symptoms with your nurse.   Your doctor will most likely have prescribed pain medications.      If you are going home after surgery you will receive individualized written care instructions before being discharged.  A responsible adult must drive you to and from the hospital on the day of your surgery and stay with you for 24 hours.  Discharge prescriptions can be filled by the hospital pharmacy during regular pharmacy hours.  If you are having surgery late in the day/evening your prescription may be e-prescribed to your pharmacy.  Please verify  your pharmacy hours or chose a 24 hour pharmacy to avoid not having access to your prescription because your pharmacy has closed for the day.        If you have any questions please call Pre-Admission Testing at [phone].  Deductibles and co-payments are collected on the day of service. Please be prepared to pay the required co-pay, deductible or deposit on the day of service as defined by your plan.    Call your surgeon immediately if you experience any of the following symptoms:  Sore Throat  Shortness of Breath or difficulty breathing  Cough  Chills  Body soreness or muscle pain  Headache  Fever  New loss of taste or smell  Do not arrive for your surgery ill.  Your procedure will need to be rescheduled to another time.  You will need to call your physician before the day of surgery to avoid any unnecessary exposure to hospital staff as well as other patients.

## 2023-11-13 ENCOUNTER — ANESTHESIA (OUTPATIENT)
Dept: PERIOP | Facility: HOSPITAL | Age: 63
End: 2023-11-13
Payer: MEDICARE

## 2023-11-13 ENCOUNTER — ANESTHESIA EVENT (OUTPATIENT)
Dept: PERIOP | Facility: HOSPITAL | Age: 63
End: 2023-11-13
Payer: MEDICARE

## 2023-11-13 ENCOUNTER — HOSPITAL ENCOUNTER (OUTPATIENT)
Facility: HOSPITAL | Age: 63
Setting detail: HOSPITAL OUTPATIENT SURGERY
Discharge: HOME | End: 2023-11-13
Attending: COLON & RECTAL SURGERY | Admitting: COLON & RECTAL SURGERY
Payer: MEDICARE

## 2023-11-13 VITALS
OXYGEN SATURATION: 98 % | HEIGHT: 63 IN | BODY MASS INDEX: 36.5 KG/M2 | RESPIRATION RATE: 18 BRPM | WEIGHT: 206 LBS | SYSTOLIC BLOOD PRESSURE: 129 MMHG | TEMPERATURE: 98 F | HEART RATE: 76 BPM | DIASTOLIC BLOOD PRESSURE: 76 MMHG

## 2023-11-13 DIAGNOSIS — K64.4 EXTERNAL HEMORRHOIDS WITH COMPLICATION: ICD-10-CM

## 2023-11-13 DIAGNOSIS — K64.8 INTERNAL HEMORRHOIDS WITH COMPLICATION: ICD-10-CM

## 2023-11-13 DIAGNOSIS — K60.2 ANAL FISSURE: ICD-10-CM

## 2023-11-13 LAB
GLUCOSE BLDC GLUCOMTR-MCNC: 111 MG/DL (ref 70–130)
GLUCOSE BLDC GLUCOMTR-MCNC: 138 MG/DL (ref 70–130)

## 2023-11-13 PROCEDURE — 25010000002 PROPOFOL 200 MG/20ML EMULSION: Performed by: NURSE ANESTHETIST, CERTIFIED REGISTERED

## 2023-11-13 PROCEDURE — 25010000002 ONDANSETRON PER 1 MG: Performed by: NURSE ANESTHETIST, CERTIFIED REGISTERED

## 2023-11-13 PROCEDURE — 25010000002 FENTANYL CITRATE (PF) 50 MCG/ML SOLUTION: Performed by: NURSE ANESTHETIST, CERTIFIED REGISTERED

## 2023-11-13 PROCEDURE — 46505 CHEMODENERVATION ANAL MUSC: CPT | Performed by: COLON & RECTAL SURGERY

## 2023-11-13 PROCEDURE — 25010000002 MEPERIDINE 25 MG/0.5ML SOLUTION: Performed by: ANESTHESIOLOGY

## 2023-11-13 PROCEDURE — 25010000002 AZTREONAM PER 500 MG: Performed by: COLON & RECTAL SURGERY

## 2023-11-13 PROCEDURE — 25810000003 LACTATED RINGERS PER 1000 ML: Performed by: ANESTHESIOLOGY

## 2023-11-13 PROCEDURE — 46083 INC THROMBOSED HROID XTRNL: CPT | Performed by: COLON & RECTAL SURGERY

## 2023-11-13 PROCEDURE — 25010000002 CLINDAMYCIN 900 MG/50ML SOLUTION: Performed by: COLON & RECTAL SURGERY

## 2023-11-13 PROCEDURE — 25010000002 KETOROLAC TROMETHAMINE PER 15 MG: Performed by: NURSE ANESTHETIST, CERTIFIED REGISTERED

## 2023-11-13 PROCEDURE — 46260 REMOVE IN/EX HEM GROUPS 2+: CPT | Performed by: COLON & RECTAL SURGERY

## 2023-11-13 PROCEDURE — 82948 REAGENT STRIP/BLOOD GLUCOSE: CPT

## 2023-11-13 PROCEDURE — 88304 TISSUE EXAM BY PATHOLOGIST: CPT | Performed by: COLON & RECTAL SURGERY

## 2023-11-13 PROCEDURE — 25010000002 DEXAMETHASONE PER 1 MG: Performed by: NURSE ANESTHETIST, CERTIFIED REGISTERED

## 2023-11-13 RX ORDER — SUCCINYLCHOLINE/SOD CL,ISO/PF 200MG/10ML
SYRINGE (ML) INTRAVENOUS AS NEEDED
Status: DISCONTINUED | OUTPATIENT
Start: 2023-11-13 | End: 2023-11-13 | Stop reason: SURG

## 2023-11-13 RX ORDER — PROPOFOL 10 MG/ML
INJECTION, EMULSION INTRAVENOUS AS NEEDED
Status: DISCONTINUED | OUTPATIENT
Start: 2023-11-13 | End: 2023-11-13 | Stop reason: SURG

## 2023-11-13 RX ORDER — HYDROMORPHONE HYDROCHLORIDE 1 MG/ML
0.5 INJECTION, SOLUTION INTRAMUSCULAR; INTRAVENOUS; SUBCUTANEOUS
Status: DISCONTINUED | OUTPATIENT
Start: 2023-11-13 | End: 2023-11-13 | Stop reason: HOSPADM

## 2023-11-13 RX ORDER — ONDANSETRON 2 MG/ML
INJECTION INTRAMUSCULAR; INTRAVENOUS AS NEEDED
Status: DISCONTINUED | OUTPATIENT
Start: 2023-11-13 | End: 2023-11-13 | Stop reason: SURG

## 2023-11-13 RX ORDER — SODIUM CHLORIDE, SODIUM LACTATE, POTASSIUM CHLORIDE, CALCIUM CHLORIDE 600; 310; 30; 20 MG/100ML; MG/100ML; MG/100ML; MG/100ML
100 INJECTION, SOLUTION INTRAVENOUS CONTINUOUS
Status: DISCONTINUED | OUTPATIENT
Start: 2023-11-13 | End: 2023-11-13 | Stop reason: HOSPADM

## 2023-11-13 RX ORDER — CLINDAMYCIN PHOSPHATE 900 MG/50ML
900 INJECTION INTRAVENOUS ONCE
Status: COMPLETED | OUTPATIENT
Start: 2023-11-13 | End: 2023-11-13

## 2023-11-13 RX ORDER — POLYETHYLENE GLYCOL 3350 17 G/17G
17 POWDER, FOR SOLUTION ORAL 2 TIMES DAILY
Start: 2023-11-13

## 2023-11-13 RX ORDER — DEXAMETHASONE SODIUM PHOSPHATE 4 MG/ML
INJECTION, SOLUTION INTRA-ARTICULAR; INTRALESIONAL; INTRAMUSCULAR; INTRAVENOUS; SOFT TISSUE AS NEEDED
Status: DISCONTINUED | OUTPATIENT
Start: 2023-11-13 | End: 2023-11-13 | Stop reason: SURG

## 2023-11-13 RX ORDER — EPHEDRINE SULFATE 50 MG/ML
5 INJECTION, SOLUTION INTRAVENOUS ONCE AS NEEDED
Status: DISCONTINUED | OUTPATIENT
Start: 2023-11-13 | End: 2023-11-13 | Stop reason: HOSPADM

## 2023-11-13 RX ORDER — HYDROCODONE BITARTRATE AND ACETAMINOPHEN 5; 325 MG/1; MG/1
1 TABLET ORAL ONCE AS NEEDED
Status: DISCONTINUED | OUTPATIENT
Start: 2023-11-13 | End: 2023-11-13 | Stop reason: HOSPADM

## 2023-11-13 RX ORDER — NALOXONE HCL 0.4 MG/ML
0.2 VIAL (ML) INJECTION AS NEEDED
Status: DISCONTINUED | OUTPATIENT
Start: 2023-11-13 | End: 2023-11-13 | Stop reason: HOSPADM

## 2023-11-13 RX ORDER — PROMETHAZINE HYDROCHLORIDE 25 MG/1
25 TABLET ORAL ONCE AS NEEDED
Status: DISCONTINUED | OUTPATIENT
Start: 2023-11-13 | End: 2023-11-13 | Stop reason: HOSPADM

## 2023-11-13 RX ORDER — SODIUM CHLORIDE 0.9 % (FLUSH) 0.9 %
3-10 SYRINGE (ML) INJECTION AS NEEDED
Status: DISCONTINUED | OUTPATIENT
Start: 2023-11-13 | End: 2023-11-13 | Stop reason: HOSPADM

## 2023-11-13 RX ORDER — HYDRALAZINE HYDROCHLORIDE 20 MG/ML
5 INJECTION INTRAMUSCULAR; INTRAVENOUS
Status: DISCONTINUED | OUTPATIENT
Start: 2023-11-13 | End: 2023-11-13 | Stop reason: HOSPADM

## 2023-11-13 RX ORDER — OXYCODONE HYDROCHLORIDE AND ACETAMINOPHEN 5; 325 MG/1; MG/1
1-2 TABLET ORAL EVERY 6 HOURS PRN
Qty: 36 TABLET | Refills: 0 | Status: SHIPPED | OUTPATIENT
Start: 2023-11-13 | End: 2023-12-04

## 2023-11-13 RX ORDER — DROPERIDOL 2.5 MG/ML
0.62 INJECTION, SOLUTION INTRAMUSCULAR; INTRAVENOUS
Status: DISCONTINUED | OUTPATIENT
Start: 2023-11-13 | End: 2023-11-13 | Stop reason: HOSPADM

## 2023-11-13 RX ORDER — PROMETHAZINE HYDROCHLORIDE 25 MG/1
25 SUPPOSITORY RECTAL ONCE AS NEEDED
Status: DISCONTINUED | OUTPATIENT
Start: 2023-11-13 | End: 2023-11-13 | Stop reason: HOSPADM

## 2023-11-13 RX ORDER — ONDANSETRON 2 MG/ML
4 INJECTION INTRAMUSCULAR; INTRAVENOUS ONCE AS NEEDED
Status: DISCONTINUED | OUTPATIENT
Start: 2023-11-13 | End: 2023-11-13 | Stop reason: HOSPADM

## 2023-11-13 RX ORDER — ACETAMINOPHEN 650 MG
TABLET, EXTENDED RELEASE ORAL AS NEEDED
Status: DISCONTINUED | OUTPATIENT
Start: 2023-11-13 | End: 2023-11-13 | Stop reason: HOSPADM

## 2023-11-13 RX ORDER — ONDANSETRON 4 MG/1
4 TABLET, FILM COATED ORAL ONCE AS NEEDED
Status: DISCONTINUED | OUTPATIENT
Start: 2023-11-13 | End: 2023-11-13 | Stop reason: HOSPADM

## 2023-11-13 RX ORDER — FLUMAZENIL 0.1 MG/ML
0.2 INJECTION INTRAVENOUS AS NEEDED
Status: DISCONTINUED | OUTPATIENT
Start: 2023-11-13 | End: 2023-11-13 | Stop reason: HOSPADM

## 2023-11-13 RX ORDER — SODIUM CHLORIDE, SODIUM LACTATE, POTASSIUM CHLORIDE, CALCIUM CHLORIDE 600; 310; 30; 20 MG/100ML; MG/100ML; MG/100ML; MG/100ML
9 INJECTION, SOLUTION INTRAVENOUS CONTINUOUS
Status: DISCONTINUED | OUTPATIENT
Start: 2023-11-13 | End: 2023-11-13 | Stop reason: HOSPADM

## 2023-11-13 RX ORDER — MAGNESIUM HYDROXIDE 1200 MG/15ML
LIQUID ORAL AS NEEDED
Status: DISCONTINUED | OUTPATIENT
Start: 2023-11-13 | End: 2023-11-13 | Stop reason: HOSPADM

## 2023-11-13 RX ORDER — OXYCODONE AND ACETAMINOPHEN 7.5; 325 MG/1; MG/1
1 TABLET ORAL EVERY 4 HOURS PRN
Status: DISCONTINUED | OUTPATIENT
Start: 2023-11-13 | End: 2023-11-13 | Stop reason: HOSPADM

## 2023-11-13 RX ORDER — DIPHENHYDRAMINE HYDROCHLORIDE 50 MG/ML
12.5 INJECTION INTRAMUSCULAR; INTRAVENOUS
Status: DISCONTINUED | OUTPATIENT
Start: 2023-11-13 | End: 2023-11-13 | Stop reason: HOSPADM

## 2023-11-13 RX ORDER — LIDOCAINE HYDROCHLORIDE 20 MG/ML
INJECTION, SOLUTION INFILTRATION; PERINEURAL AS NEEDED
Status: DISCONTINUED | OUTPATIENT
Start: 2023-11-13 | End: 2023-11-13 | Stop reason: SURG

## 2023-11-13 RX ORDER — FAMOTIDINE 10 MG/ML
20 INJECTION, SOLUTION INTRAVENOUS ONCE
Status: COMPLETED | OUTPATIENT
Start: 2023-11-13 | End: 2023-11-13

## 2023-11-13 RX ORDER — IPRATROPIUM BROMIDE AND ALBUTEROL SULFATE 2.5; .5 MG/3ML; MG/3ML
3 SOLUTION RESPIRATORY (INHALATION) ONCE AS NEEDED
Status: DISCONTINUED | OUTPATIENT
Start: 2023-11-13 | End: 2023-11-13 | Stop reason: HOSPADM

## 2023-11-13 RX ORDER — LIDOCAINE 50 MG/G
1 OINTMENT TOPICAL EVERY 4 HOURS PRN
Qty: 35.44 G | Refills: 4 | Status: SHIPPED | OUTPATIENT
Start: 2023-11-13 | End: 2023-12-13

## 2023-11-13 RX ORDER — LABETALOL HYDROCHLORIDE 5 MG/ML
5 INJECTION, SOLUTION INTRAVENOUS
Status: DISCONTINUED | OUTPATIENT
Start: 2023-11-13 | End: 2023-11-13 | Stop reason: HOSPADM

## 2023-11-13 RX ORDER — FENTANYL CITRATE 50 UG/ML
INJECTION, SOLUTION INTRAMUSCULAR; INTRAVENOUS AS NEEDED
Status: DISCONTINUED | OUTPATIENT
Start: 2023-11-13 | End: 2023-11-13 | Stop reason: SURG

## 2023-11-13 RX ORDER — KETOROLAC TROMETHAMINE 30 MG/ML
INJECTION, SOLUTION INTRAMUSCULAR; INTRAVENOUS AS NEEDED
Status: DISCONTINUED | OUTPATIENT
Start: 2023-11-13 | End: 2023-11-13 | Stop reason: SURG

## 2023-11-13 RX ORDER — FENTANYL CITRATE 50 UG/ML
50 INJECTION, SOLUTION INTRAMUSCULAR; INTRAVENOUS
Status: DISCONTINUED | OUTPATIENT
Start: 2023-11-13 | End: 2023-11-13 | Stop reason: HOSPADM

## 2023-11-13 RX ORDER — SODIUM CHLORIDE 0.9 % (FLUSH) 0.9 %
3 SYRINGE (ML) INJECTION EVERY 12 HOURS SCHEDULED
Status: DISCONTINUED | OUTPATIENT
Start: 2023-11-13 | End: 2023-11-13 | Stop reason: HOSPADM

## 2023-11-13 RX ORDER — LIDOCAINE HYDROCHLORIDE 10 MG/ML
0.5 INJECTION, SOLUTION INFILTRATION; PERINEURAL ONCE AS NEEDED
Status: DISCONTINUED | OUTPATIENT
Start: 2023-11-13 | End: 2023-11-13 | Stop reason: HOSPADM

## 2023-11-13 RX ORDER — ROCURONIUM BROMIDE 10 MG/ML
INJECTION, SOLUTION INTRAVENOUS AS NEEDED
Status: DISCONTINUED | OUTPATIENT
Start: 2023-11-13 | End: 2023-11-13 | Stop reason: SURG

## 2023-11-13 RX ADMIN — PROPOFOL 70 MG: 10 INJECTION, EMULSION INTRAVENOUS at 15:01

## 2023-11-13 RX ADMIN — AZTREONAM 2 G: 2 INJECTION, POWDER, LYOPHILIZED, FOR SOLUTION INTRAMUSCULAR; INTRAVENOUS at 14:37

## 2023-11-13 RX ADMIN — ONDANSETRON 4 MG: 2 INJECTION INTRAMUSCULAR; INTRAVENOUS at 15:00

## 2023-11-13 RX ADMIN — OXYCODONE AND ACETAMINOPHEN 1 TABLET: 7.5; 325 TABLET ORAL at 17:58

## 2023-11-13 RX ADMIN — LIDOCAINE HYDROCHLORIDE 60 MG: 20 INJECTION, SOLUTION INFILTRATION; PERINEURAL at 14:45

## 2023-11-13 RX ADMIN — FAMOTIDINE 20 MG: 10 INJECTION INTRAVENOUS at 14:01

## 2023-11-13 RX ADMIN — KETOROLAC TROMETHAMINE 30 MG: 30 INJECTION, SOLUTION INTRAMUSCULAR at 15:28

## 2023-11-13 RX ADMIN — CLINDAMYCIN IN 5 PERCENT DEXTROSE 900 MG: 18 INJECTION, SOLUTION INTRAVENOUS at 14:37

## 2023-11-13 RX ADMIN — MEPERIDINE HYDROCHLORIDE 12.5 MG: 25 INJECTION, SOLUTION INTRAMUSCULAR; INTRAVENOUS; SUBCUTANEOUS at 16:40

## 2023-11-13 RX ADMIN — ROCURONIUM BROMIDE 10 MG: 10 INJECTION, SOLUTION INTRAVENOUS at 14:46

## 2023-11-13 RX ADMIN — PROPOFOL 130 MG: 10 INJECTION, EMULSION INTRAVENOUS at 14:46

## 2023-11-13 RX ADMIN — Medication 3 ML: at 14:02

## 2023-11-13 RX ADMIN — SODIUM CHLORIDE, POTASSIUM CHLORIDE, SODIUM LACTATE AND CALCIUM CHLORIDE 9 ML/HR: 600; 310; 30; 20 INJECTION, SOLUTION INTRAVENOUS at 14:01

## 2023-11-13 RX ADMIN — MEPERIDINE HYDROCHLORIDE 12.5 MG: 25 INJECTION, SOLUTION INTRAMUSCULAR; INTRAVENOUS; SUBCUTANEOUS at 16:57

## 2023-11-13 RX ADMIN — DEXAMETHASONE SODIUM PHOSPHATE 6 MG: 4 INJECTION, SOLUTION INTRA-ARTICULAR; INTRALESIONAL; INTRAMUSCULAR; INTRAVENOUS; SOFT TISSUE at 15:00

## 2023-11-13 RX ADMIN — Medication 160 MG: at 14:47

## 2023-11-13 RX ADMIN — FENTANYL CITRATE 50 MCG: 50 INJECTION, SOLUTION INTRAMUSCULAR; INTRAVENOUS at 15:00

## 2023-11-13 NOTE — H&P
The patient is status post chemical sphincterotomy with Botox. It was noted back in 02/2023 when that was done that patient had posterior hemorrhoids. She was seen postoperatively and did not want to do any surgical treatment of the hemorrhoids at that time. She has now undergone a left total knee arthroplasty on 06/20/2023 and has been doing physical therapy and rehab.       The patient reports she had a left total knee arthroplasty on 06/20/2023. She states it was the worst thing she has ever had to go through. The patient reports she still has not been released. She states she will see him again the first week of 10/2023. The patient reports she still has physical therapy this week. She states it is supposed to be her last week. The patient reports she cried the first 3 weeks because she was in so much pain almost every day. She states she was put on tramadol, which was a lot better. The patient reports she still has one hemorrhoid that they have been fighting. She states it has gotten big. The patient reports she has been doing ice, showering, and keeping it clean. She states every time she puts something on it, it irritates it more. The patient reports she will get in the shower and let the water run. She states she could not get in the tub with her knee. The patient reports she would get ice to get it to go down. She states she tries to stay off of it. The patient reports there have been some physical therapy dates that she did not want to tell them what was going on, but she would be doing her best. The patient reports she is trying to stay on top of it because she has a wedding coming up the first week that they are traveling to [place]. The patient reports she has a lot going on. She states she is trying to keep it calm. The patient reports she thinks it is going to have to come out. She states she drinks Smooth Move almost every night, if she does not, it gets irritated. The patient reports she has to strain  more or reach around and help guide it because she feels like it is being blocked. She states she tries not to strain. The patient reports she feels like the Botox wore off on 06/20/2023. The patient reports she has a little bit of tissue that hangs down and she feels like sometimes it gets caught. She states that she has to have her right knee replaced. She is trying to hold off until 05/2024 to have it done.        Past Medical History:   Diagnosis Date    Abnormal Pap smear of cervix     Age-related nuclear cataract of both eyes 02/03/2020    Anal fissure 10/2021    Anal skin tag 08/2022    S/P EXCISION    Ankle fracture 12/2022    RIGHT    Arthrodesis status 03/08/2014    Asthma     Benign essential hypertension 06/05/2015    Continue lisinopril    Carpal tunnel syndrome     PING    Cervical spondylolysis     Chronic neck pain 07/19/2016    Closed nondisplaced fracture of lateral malleolus of right fibula 12/10/2022    NO SURGERY, SEEN AT [facility]    Cystitis 11/2017    Dry eye syndrome 02/03/2020    Environmental allergies     Exogenous hypertriglyceridemia 06/05/2017    Ganglion cyst of finger 01/2020    Hemorrhoids     History of COVID-19 05/2022    History of torn meniscus of left knee     Hormone replacement therapy (HRT)     Hyperchylomicronemia     Hyperlipidemia 06/05/2015    ASCVD 10-year risk ~10% Plan: · Start statin. Unclear whether arm numbness really 2/2 statin in the past.    Hyperopia of both eyes 02/03/2020    Keratoconjunctivitis sicca 02/03/2020    Knee pain, left     Lichen sclerosus     Lung nodule 06/01/2006    Obstructive sleep apnea syndrome 11/01/2004    Not using CPAP due to eye issues    Palpitations     Pertussis 02/08/2016    [facility]    PNA (pneumonia)     PONV (postoperative nausea and vomiting)     Presbyopia 02/03/2020    Rotator cuff injury 12/04/2011    SEEN AT [facility]    Seasonal allergies     Slow to wake up after anesthesia     Snoring     Type 2 diabetes mellitus without  "complication 2015    Under good control Plan: · Reassured about use of metformin    Urinary urgency 2021     Past Surgical History:   Procedure Laterality Date    ANTERIOR CERVICAL DISCECTOMY W/ FUSION N/A 2014    C6-7, DR. ALEXEY IZAGUIRRE AT [facility] TITANIUM [facility]    ANUS SURGERY N/A 2022    Procedure: ANAL TAG EXCISION;  Surgeon: Bradley Santiago MD;  Location:  [facility];  Service: General;  Laterality: N/A;  pre-anal tag  post-same    CARPAL TUNNEL RELEASE Bilateral      SECTION          COLONOSCOPY N/A     COLONOSCOPY N/A 2022    EXTERNAL AND INTERNAL HEMORRHOIDS, GRADE 3-BANDED, RESCOPE IN 10 YRS, DR. BRADLEY SANTIAGO AT [facility]    HEMORRHOIDECTOMY N/A 2022    Procedure: HEMORRHOID BANDING x3;  Surgeon: Bradley Santiago MD;  [facility];  Service: General;  Laterality: N/A;  pre-hemorrhoids  post-same    HYSTERECTOMY N/A     WITH BSO, FOR HEAVY BLEEDING    INCISIONAL BREAST BIOPSY Right     BENIGN    LAPAROSCOPIC LYSIS OF ADHESIONS N/A     OOPHORECTOMY      OOPHORECTOMY      SPHINCTEROTOMY N/A 2023    Procedure: EXAM UNDER ANESTHESIA, CHEMICAL SPHINCTEROTOMY WITH BOTOX;  Surgeon: Bradley Santiago MD;  Location: [facility] MAIN OR;  Service: General;  Laterality: N/A;    TOTAL KNEE ARTHROPLASTY Left 2023    Procedure: LEFT TOTAL KNEE ARTHROPLASTY WITH JACE NAVIGATION;  Surgeon: Tomás Rasmussen MD;  Location: [facility];  Service: Orthopedics;  Laterality: Left;       Vitals:    23 1302   BP: 162/80   BP Location: Right arm   Patient Position: Sitting   Pulse: 84   Resp: 15   Temp: 98.2 °F (36.8 °C)   TempSrc: Oral   SpO2: 98%   Weight: 93.4 kg (206 lb)   Height: 160 cm (63\")            PE:  Physical Exam  Constitutional:       General: She is not in acute distress.     Appearance: She is well-developed.   HENT:      Head: Normocephalic and atraumatic.   Abdominal:      General: There is no distension.      Palpations: " Abdomen is soft.   Musculoskeletal:         General: Normal range of motion.   Neurological:      Mental Status: She is alert.   Psychiatric:         Thought Content: Thought content normal.               Assessment:  1. Anal fissure    2. Internal hemorrhoids with complication    3. External hemorrhoids with complication          Plan:  -I advised the patient that we have hemorrhoids in lying in the anal entire canal, even if we remove 1 does not mean another won't do something similar. This hemorrhoid is already large and problematic, we can absolutely remove it and stitch up the area. I explained to her that we are unable to remove every hemorrhoid because they are part of the blood supply to the anus. I explained to her that the surgery is painful. I informed the patient that we will typically put you on some extra stool softeners while she is recovering to keep things on the looser side. I informed the patient that I would be removing the tag, the external hemorrhoid and plus the internal hemorrhoid. We discussed limitation after surgery. I explained to her that first week of recovery is the worst, I ask that she not travel because it will upset the surgical area. I informed her that she can so light household activities, I ask that she not lift anything greater that 15 to 20 pounds.   -I advised the patient that she can continue drinking Smooth move and add MiraLAX at the opposite time of day. I informed her that some people are taking MiraLAX twice a day. I explained that I prefer that she has diarrhea than to be constipated and have hard bowel movements going past new sutures.   - I explained to the patient that the Botox is to help the sphincter not spasm.   - The patient will be scheduled for chemical sphincterotomy with Botox and hemorrhoidectomy.  -I described with patient typical surgical time, postop recovery including pain management, and restrictions. I discussed with patient risks, benefits, and  alternatives. The patient had an opportunity to ask questions. I answered all questions. Patient understands and wishes to proceed with the procedure.

## 2023-11-13 NOTE — DISCHARGE INSTRUCTIONS
Dr. Sarai Perez  [address]  [place] [id number] [phone]      Discharge Instructions for Hemorrhoidectomy/Anal Fissures/Fistulas      Go home, rest and take it easy today; however, you should get up and move about several times today to reduce the risk of developing a clot in your legs.      You may experience some dizziness or memory loss from the anesthesia.  This may last for the next 24 hours.  Someone should plan on staying with you for the first 24 hours for your safety.    Do not make any important legal decisions or sign any legal papers for the next 24 hours.      Eat and drink lightly today.  Start off with liquids, jello, soup, crackers or other bland foods at first. Please drink plenty of fluids. You may advance your diet tomorrow as tolerated as long as you do not experience any nausea or vomiting.     Some patients will have packing in their rectum.  It should come out will your first bowel movement.  You may remove it sooner yourself if it bothers you.  If it comes out on its own before your first bowel movement, do not worry about it.  It does not need to be replaced.      Begin your sitz baths tomorrow.    The best method of pain relief is a sitz bath (sitting in a tub or warm water) at least 3 times daily for 10 minutes.  This helps to reduce pain and aids with hygiene/drainage.  The drainage may have an unpleasant odor.  This is not unexpected and should be controlled with baths and showers.  If the skin around the anal area becomes irritated, you may apply Vaseline, A&D ointment or a similar barrier cream to the area.       Bleeding and drainage are to be expected and may persist for as long as 2-4 weeks.  Bleeding may occur with your bowel movements as well.  Wear a cotton liner such as a Kotex pad or a panty liner inside your underwear to protect your clothing.       You have received a prescription for a narcotic pain medicine, as you will have pain following surgery.    You will not be totally pain free, but your pain medicine should make the pain tolerable.  Please take your pain medicine as prescribed and always take your pills with food to prevent nausea. Your pain may persist for 1-3 weeks. If you are having severe pain that cannot be controlled by the pain medicine, please contact me.  Typically, patients with anal fissures will have less pain than those with hemorrhoids.      The goal is for your bowel movements to be soft which will help to minimize pain.  The pain medicine used to keep your comfortable may also cause some constipation so I recommend the following:    Miralax (17 grams)--1 capfull every day starting the day after surgery.    Keep taking fiber everyday (Citrucel, Metamucil, or Fiber-con) as directed.    If you are unable to have a bowel movement by 2 days after surgery, try Milk of Magnesia, Magnesium Citrate, or Colace.  If still unable to have a bowel movement, call the office at [phone]      No driving for 24 hours and for as long as you are taking your prescription pain medicine.  You may resume your activities gradually.       You will need to call the office at [phone] to schedule a follow-up appointment in 10-21 days.    Remember to contact me for any of the following:    Fever > 100.5 degrees  Severe pain that cannot be controlled by taking your pain pills  Severe nausea or vomiting   Significant bleeding > 1/4 cup  Any other questions or concerns

## 2023-11-13 NOTE — OP NOTE
Surgeon: Sarai Perez MD    Surgical  Assistant: Britni Spears PA-C     Preoperative diagnosis: Anal fissure [K60.2]  Internal hemorrhoids with complication [K64.8]  External hemorrhoids with complication [K64.4]    Post-Op Diagnosis Codes:     * Anal fissure [K60.2]     * Internal hemorrhoids with complication [K64.8]     * External hemorrhoids with complication [K64.4]    Procedure: chemical sphincterotomy with Botox and hemorrhoidectomy, * Panel 2 does not exist *    Estimated Blood Loss: minimal    Specimens:   Specimens       ID Source Type Tests Collected By Collected At Frozen?    A Anus Tissue TISSUE PATHOLOGY EXAM   Sarai Perez MD 11/13/23 1405     Description: Left Lateral Hemorrhoid    B Anus Tissue TISSUE PATHOLOGY EXAM   Sarai Perez MD 11/13/23 1515     Description: Right Posterior Hemorrhoid           Order Name Source Comment Collection Info Order Time   TISSUE PATHOLOGY EXAM Anus  Collected By: Sarai Perez MD 11/13/2023  3:20 PM     Release to patient   Routine Release            Indication:  Alisha Connolly is a 63 y.o. female who comes in with Anal fissure    Internal hemorrhoids with complication    External hemorrhoids with complication  Patient understands risks, benefits,and alternatives wishes to proceed.      Procedure Details:The patient was brought to the operating room, SCD's in place, antibiotics infused.  After general anesthesia was achieved, the patient was placed prone on the operating room table.  Buttocks were effaced with tape.  She was prepped and draped in sterile fashion.  Lidocaine 1% with epinephrine and 0.25% Marcaine without was used as a local infiltration in a perineum block.  I did a circumferential exam of the anal canal.  In the left lateral position, the patient has a thrombosed hemorrhoid.  She has an enlarged right posterior and a healed fissure posteriorly.   Using lighted Hill-Manzanares we used that to retract the anal canal. I did the left  lateral hemorrhoid first.  I made an elliptical incision around the external hemorrhoid complex, taking out all of the clot and then sweeping the sphincter muscle out of the way.  Once we got into the internal hemorrhoid component, I used the Enseal to ligate and remove the hemorrhoid complex off of the base.  I then used a 2-0 Vicryl to close the internal portion of it.  At the intersphincteric groove, I identified the internal and external sphincter muscles.  At the internal sphincter muscle, I placed 40 units of Botox directly into the sphincter under direct visualization and anteriorly I placed 30 after putting the sphincter muscle on some stretch, and then I did the right posterior hemorrhoid similarly to the left lateral.  There just was not as much organized external hemorrhoid clot.  I also placed 30 units of Botox into the internal sphincter there.  All instruments, lap counts, needle counts were correct.  The patient was stable throughout the entire case and was taken to recovery.            Assistant: Britni Spears PA-C was responsible for performing the following activities: Retraction, Suction, Irrigation, Suturing, Closing and Placing Dressing and their skilled assistance was necessary for the success of this case

## 2023-11-13 NOTE — ANESTHESIA PREPROCEDURE EVALUATION
Anesthesia Evaluation     Patient summary reviewed and Nursing notes reviewed   history of anesthetic complications:  PONV  NPO Solid Status: > 8 hours  NPO Liquid Status: > 2 hours           Airway   Mallampati: II  TM distance: >3 FB  Neck ROM: full  no difficulty expected  Dental - normal exam     Pulmonary - normal exam   (+) pneumonia resolved , a smoker Former, asthma,sleep apnea  (-) COPD, lung cancer  Cardiovascular - normal exam  Exercise tolerance: good (4-7 METS)    ECG reviewed  Rhythm: regular  Rate: normal    (+) hypertension well controlled 2 medications or greater, valvular problems/murmurs murmur, hyperlipidemia  (-) past MI, CAD, dysrhythmias, angina, CHF, cardiac stents, CABG    ROS comment: Echocardiogram 6/16/2023 showed LVEF 61.5%, grade 1 diastolic dysfunction, trace tricuspid regurgitation with normal right-sided pressures.  Perfusion stress test was negative for ischemia and considered low risk.    Neuro/Psych  (+) numbness  (-) seizures, TIA, CVA  GI/Hepatic/Renal/Endo    (+) obesity, diabetes mellitus type 2 well controlled  (-) hiatal hernia, GERD, PUD, hepatitis, liver disease, no renal disease, GI bleed    Musculoskeletal     (+) neck pain  Abdominal  - normal exam   Substance History      OB/GYN          Other   arthritis,                 Anesthesia Plan    ASA 3     general     intravenous induction     Anesthetic plan, risks, benefits, and alternatives have been provided, discussed and informed consent has been obtained with: patient.    CODE STATUS:

## 2023-11-14 NOTE — ANESTHESIA POSTPROCEDURE EVALUATION
"Patient: Alisha Connolly    Procedure Summary       Date: 11/13/23 Room / Location: [facility] OR  / [facility] MAIN OR    Anesthesia Start: 1441 Anesthesia Stop: 1530    Procedure: chemical sphincterotomy with Botox and hemorrhoidectomy (Anus) Diagnosis:       Anal fissure      Internal hemorrhoids with complication      External hemorrhoids with complication      (Anal fissure [K60.2])      (Internal hemorrhoids with complication [K64.8])      (External hemorrhoids with complication [K64.4])    Surgeons: Sarai Perez MD Provider: Yossi Avilez MD    Anesthesia Type: general ASA Status: 3            Anesthesia Type: general    Vitals  Vitals Value Taken Time   /74 11/13/23 1730   Temp 36.7 °C (98 °F) 11/13/23 1715   Pulse 77 11/13/23 1735   Resp 16 11/13/23 1730   SpO2 92 % 11/13/23 1735   Vitals shown include unfiled device data.        Post Anesthesia Care and Evaluation    Patient location during evaluation: PACU  Patient participation: complete - patient participated  Level of consciousness: awake  Pain management: adequate    Airway patency: patent  Anesthetic complications: No anesthetic complications    Cardiovascular status: acceptable  Respiratory status: acceptable  Hydration status: acceptable    Comments: /76   Pulse 76   Temp 36.7 °C (98 °F) (Oral)   Resp 18   Ht 160 cm (63\")   Wt 93.4 kg (206 lb)   LMP  (LMP Unknown)   SpO2 98%   BMI 36.49 kg/m²       "

## 2023-11-15 LAB
LAB AP CASE REPORT: NORMAL
PATH REPORT.FINAL DX SPEC: NORMAL
PATH REPORT.GROSS SPEC: NORMAL

## 2023-11-27 ENCOUNTER — OFFICE VISIT (OUTPATIENT)
Dept: FAMILY MEDICINE CLINIC | Facility: CLINIC | Age: 63
End: 2023-11-27
Payer: MEDICARE

## 2023-11-27 VITALS
HEIGHT: 63 IN | TEMPERATURE: 97.8 F | SYSTOLIC BLOOD PRESSURE: 145 MMHG | BODY MASS INDEX: 37.03 KG/M2 | WEIGHT: 209 LBS | OXYGEN SATURATION: 97 % | DIASTOLIC BLOOD PRESSURE: 78 MMHG | HEART RATE: 78 BPM

## 2023-11-27 DIAGNOSIS — Z23 FLU VACCINE NEED: ICD-10-CM

## 2023-11-27 DIAGNOSIS — I10 BENIGN ESSENTIAL HYPERTENSION: Chronic | ICD-10-CM

## 2023-11-27 DIAGNOSIS — Z00.00 ENCOUNTER FOR INITIAL ANNUAL WELLNESS VISIT (AWV) IN MEDICARE PATIENT: Primary | ICD-10-CM

## 2023-11-27 DIAGNOSIS — E78.2 MIXED HYPERLIPIDEMIA: Chronic | ICD-10-CM

## 2023-11-27 DIAGNOSIS — E11.9 TYPE 2 DIABETES MELLITUS WITHOUT COMPLICATION, WITHOUT LONG-TERM CURRENT USE OF INSULIN: Chronic | ICD-10-CM

## 2023-11-27 NOTE — PROGRESS NOTES
The ABCs of the Annual Wellness Visit  [place] to Medicare Visit    Subjective     Alisha Connolly is a 63 y.o. female who presents for a  Welcome to Medicare Visit.        The following portions of the patient's history were reviewed and   updated as appropriate: allergies, current medications, past family history, past medical history, past social history, past surgical history, and problem list.     Compared to one year ago, the patient feels her physical   health is better.    Compared to one year ago, the patient feels her mental   health is worse.    Recent Hospitalizations:  She was admitted within the past 365 days at [facility].       Current Medical Providers:  Patient Care Team:  Aarti Ogden APRN as PCP - General (Nurse Practitioner)  Britni Spears PA-C as Physician Assistant (Colon and Rectal Surgery)  Sarai Perez MD as Consulting Physician (Colon and Rectal Surgery)  Rosalind Richard PA-C as Physician Assistant (Physician Assistant)  Inna Beaver MD as Consulting Physician (Cardiology)  Tomás Rasmussen MD as Consulting Physician (Orthopedic Surgery)    Outpatient Medications Prior to Visit   Medication Sig Dispense Refill    acetaminophen (TYLENOL) 500 MG tablet Take 1 tablet by mouth Every 6 (Six) Hours As Needed for Mild Pain. Indications: Pain      albuterol sulfate  (90 Base) MCG/ACT inhaler Inhale 2 puffs Every 4 (Four) Hours As Needed. Indications: Asthma      amLODIPine (NORVASC) 5 MG tablet Take 1 tablet by mouth Every Night. Indications: High Blood Pressure Disorder 90 tablet 1    cloNIDine (CATAPRES) 0.1 MG tablet Take 1 tablet by mouth 2 (Two) Times a Day As Needed for High Blood Pressure (Systolic BP > 180, Diastolic BP > 110). 15 tablet 0    Lancets (OneTouch Delica Plus Kbqtod10S) misc 1 each by Other route Daily.      lidocaine (XYLOCAINE) 5 % ointment Apply 1 application  topically to the appropriate area as directed Every 4 (Four) Hours As Needed for Mild  Pain or Moderate Pain for up to 30 days. 35.44 g 4    metFORMIN (GLUCOPHAGE) 500 MG tablet TAKE 1 TABLET BY MOUTH TWICE DAILY WITH MEALS 180 tablet 1    olmesartan (BENICAR) 40 MG tablet Take 1 tablet by mouth Daily. Indications: High Blood Pressure Disorder 90 tablet 1    OneTouch Ultra test strip 1 each by Other route Daily.      oxyCODONE-acetaminophen (Percocet) 5-325 MG per tablet Take 1-2 tablets by mouth Every 6 (Six) Hours As Needed for pain. 36 tablet 0    polyethylene glycol (MIRALAX) 17 g packet Take 17 g by mouth 2 (Two) Times a Day.       No facility-administered medications prior to visit.       Opioid medication/s are on active medication list.  and I have evaluated her active treatment plan and pain score trends (see table).  There were no vitals filed for this visit.  I have reviewed the chart for potential of high risk medication and harmful drug interactions in the elderly.          Aspirin is not on active medication list.  Aspirin use is not indicated based on review of current medical condition/s. Risk of harm outweighs potential benefits.  .    Patient Active Problem List   Diagnosis    Age-related nuclear cataract of both eyes    Arthrodesis status    Benign essential hypertension    Chronic neck pain    Dry eye syndrome    Exogenous hypertriglyceridemia    S/P hysterectomy with oophorectomy    Hyperlipidemia    Hyperopia of both eyes    Hypertension    Keratoconjunctivitis sicca    Lung nodule    History of solitary pulmonary nodule    Presbyopia    Type 2 diabetes mellitus without complication    Anal skin tag    Encounter for screening colonoscopy    Hemorrhoids    Closed nondisplaced fracture of lateral malleolus of right fibula    Anal fissure    Arthritis of left knee    Internal hemorrhoids with complication    External hemorrhoids with complication     Advance Care Planning   Advance Care Planning     Advance Directive is not on file.  ACP discussion was held with the patient during  "this visit. Patient does not have an advance directive, information provided.       Objective   Vitals:    23 1541   BP: 145/78   Pulse: 78   Temp: 97.8 °F (36.6 °C)   SpO2: 97%   Weight: 94.8 kg (209 lb)   Height: 160 cm (63\")     Estimated body mass index is 37.02 kg/m² as calculated from the following:    Height as of this encounter: 160 cm (63\").    Weight as of this encounter: 94.8 kg (209 lb).           Does the patient have evidence of cognitive impairment?   No    Lab Results   Component Value Date    TRIG 218 (H) 10/24/2023    HDL 58 10/24/2023     (H) 10/24/2023    VLDL 39 10/24/2023    HGBA1C 6.10 (H) 10/24/2023       Procedures       HEALTH RISK ASSESSMENT    Smoking Status:  Social History     Tobacco Use   Smoking Status Former    Packs/day: 0.25    Years: 4.50    Additional pack years: 0.00    Total pack years: 1.13    Types: Cigarettes    Quit date: 3/7/2014    Years since quittin.7    Passive exposure: Past   Smokeless Tobacco Never     Alcohol Consumption:  Social History     Substance and Sexual Activity   Alcohol Use Not Currently    Comment: occ       Fall Risk Screen:    NATHALY Fall Risk Assessment was completed, and patient is at LOW risk for falls.Assessment completed on:2023    Depression Screen:       2023     4:00 PM   PHQ-2/PHQ-9 Depression Screening   Little Interest or Pleasure in Doing Things 0-->not at all   Feeling Down, Depressed or Hopeless 0-->not at all   PHQ-9: Brief Depression Severity Measure Score 0       Health Habits and Functional and Cognitive Screenin/27/2023     4:00 PM   Functional & Cognitive Status   Do you have difficulty preparing food and eating? No   Do you have difficulty bathing yourself, getting dressed or grooming yourself? No   Do you have difficulty using the toilet? No   Do you have difficulty moving around from place to place? No   Do you have trouble with steps or getting out of a bed or a chair? No   Current Diet " Well Balanced Diet   Dental Exam Up to date   Eye Exam Up to date   Exercise (times per week) 7 times per week   Current Exercises Include House Cleaning   Do you need help using the phone?  No   Are you deaf or do you have serious difficulty hearing?  No   Do you need help to go to places out of walking distance? No   Do you need help shopping? No   Do you need help preparing meals?  No   Do you need help with housework?  No   Do you need help with laundry? No   Do you need help taking your medications? No   Do you need help managing money? No   Do you ever drive or ride in a car without wearing a seat belt? No   Have you felt unusual stress, anger or loneliness in the last month? No   Who do you live with? Spouse   If you need help, do you have trouble finding someone available to you? No   Do you have difficulty concentrating, remembering or making decisions? No       Visual Acuity:    No results found.    Age-appropriate Screening Schedule:  Refer to the list below for future screening recommendations based on patient's age, sex and/or medical conditions. Orders for these recommended tests are listed in the plan section. The patient has been provided with a written plan.    Health Maintenance   Topic Date Due    ZOSTER VACCINE (1 of 2) Never done    HEPATITIS C SCREENING  Never done    COVID-19 Vaccine (4 - 2023-24 season) 09/01/2023    URINE MICROALBUMIN  11/22/2023    HEMOGLOBIN A1C  04/24/2024    BMI FOLLOWUP  06/21/2024    MAMMOGRAM  06/25/2024    DIABETIC EYE EXAM  08/17/2024    DXA SCAN  08/31/2024    LIPID PANEL  10/24/2024    ANNUAL WELLNESS VISIT  11/27/2024    DIABETIC FOOT EXAM  11/27/2024    TDAP/TD VACCINES (4 - Td or Tdap) 07/19/2026    COLORECTAL CANCER SCREENING  09/19/2032    INFLUENZA VACCINE  Completed    Pneumococcal Vaccine 65+  Completed        CMS Preventative Services Quick Reference  Risk Factors Identified During Encounter    Immunizations Discussed/Encouraged: Shingrix and COVID19  The  above risks/problems have been discussed with the patient.  Pertinent information has been shared with the patient in the After Visit Summary.    Follow Up:   Initial Medicare Visit in one year    An After Visit Summary and PPPS were made available to the patient.      Additional E&M Note during same encounter follows:  Patient has multiple medical problems which are significant and separately identifiable that require additional work above and beyond the Medicare Wellness Visit.      Chief Complaint  Diabetes (F/u dm ) and Hypertension (F/u htn )    Subjective        She is also here to follow-up on T2 diabetes, hypertension, hyperlipidemia.    T2DM: Has been taking and tolerating her metformin without significant side effects.  She checks her blood sugars at least twice a day.  She has been checking it first thing in the morning fasting and it ranges from about 110-160.  She has been using this to guide her on what she can eat for breakfast.  If her blood sugar is running really high she will eat a lighter breakfast.  She requires fiber in the morning to make sure her bowels are moving and typically eats something like shredded wheat or raisin bran.  Typically does not eat again until around dinnertime.  Checks her blood sugar randomly in the evening sometimes before dinner and sometimes after usually 1 hour after with various readings.  She is requesting CGM which she thinks is a covered benefit now as she would like to get a better handle on how her blood sugars are running more of the time to help guide her diabetic management.  She is still struggling to know how she needs to eat to feel her best and keep her sugars under control as well as to lose some weight which she thinks will improve her cardiovascular health and joint health.  She has upcoming plans to meet with diabetic educator additionally.    Hypertension: Blood pressure is elevated today and she reports that she has not been taking the amlodipine  "which she was taking in the evening due to concern taking it with oxycodone.  She was worried about side effects.  She has been taking her a.m. olmesartan.    Hyperlipidemia: Patient was seen by cardiology recently for follow-up.  She has failed 3 statins due to myalgia.  Her lipids are elevated and it is recommended by cardiology that she start alternative.  Fibrates, Praluent, Repatha were given as recommendations to consider.  Patient will be started on any of these via primary care.    Patient had hemorrhoidectomy about 2 weeks ago and still recovering.  Feels like each day is a little better.    She had left TKA this past June and is recovering well from this but reports that it was one of the hardest things she is ever gone through.  Hopeful that she will be able to get her activity increased as she continues to recover from surgeries.        Higher in am-110-160  Hemorrhoid surgery 2 weeks ago  Could not tolerate statin-has failed 3  Not been taking amlodipine due to concern for       Alisha CAROLYNE Connolly is also being seen today for diabetes, hypertension, hyperlipidemia.    Review of Systems   Constitutional:  Negative for fever.   HENT:  Negative for hearing loss and trouble swallowing.    Respiratory:  Negative for cough and shortness of breath.    Cardiovascular:  Negative for chest pain and palpitations.   Gastrointestinal:  Negative for abdominal pain.   Genitourinary:  Negative for difficulty urinating and hematuria.   Musculoskeletal:  Positive for arthralgias and gait problem.   Neurological:  Negative for dizziness.   Psychiatric/Behavioral:  Negative for dysphoric mood and sleep disturbance. The patient is not nervous/anxious.        Objective   Vital Signs:  /78   Pulse 78   Temp 97.8 °F (36.6 °C)   Ht 160 cm (63\")   Wt 94.8 kg (209 lb)   SpO2 97%   BMI 37.02 kg/m²     Physical Exam  Vitals and nursing note reviewed.   Constitutional:       General: She is not in acute distress.     " Appearance: She is well-developed. She is obese. She is not ill-appearing or diaphoretic.   HENT:      Head: Normocephalic and atraumatic.      Right Ear: External ear normal.      Left Ear: External ear normal.      Mouth/Throat:      Mouth: Mucous membranes are moist.      Pharynx: No posterior oropharyngeal erythema.   Eyes:      General: No scleral icterus.        Right eye: No discharge.         Left eye: No discharge.      Conjunctiva/sclera: Conjunctivae normal.   Cardiovascular:      Rate and Rhythm: Normal rate and regular rhythm.      Heart sounds: Normal heart sounds.   Pulmonary:      Effort: Pulmonary effort is normal.      Breath sounds: Normal breath sounds.   Abdominal:      General: Bowel sounds are normal.      Palpations: Abdomen is soft.   Musculoskeletal:         General: No deformity.      Cervical back: Neck supple.   Feet:      Right foot:      Protective Sensation: 9 sites tested.  9 sites sensed.      Skin integrity: Skin integrity normal.      Toenail Condition: Right toenails are normal.      Left foot:      Protective Sensation:   9 sites sensed.      Skin integrity: Skin integrity normal.      Toenail Condition: Left toenails are normal.      Comments: Feet warm and dry and appear very well cared for  Lymphadenopathy:      Cervical: No cervical adenopathy.   Skin:     General: Skin is warm and dry.   Neurological:      General: No focal deficit present.      Mental Status: She is alert and oriented to person, place, and time.   Psychiatric:         Mood and Affect: Mood normal.         Behavior: Behavior normal.                      Assessment and Plan   Diagnoses and all orders for this visit:    1. Encounter for initial annual wellness visit (AWV) in Medicare patient (Primary)    2. Type 2 diabetes mellitus without complication, without long-term current use of insulin  -     Microalbumin / Creatinine Urine Ratio - Urine, Clean Catch; Future    3. Benign essential hypertension  -      Microalbumin / Creatinine Urine Ratio - Urine, Clean Catch; Future    4. Mixed hyperlipidemia    5. Flu vaccine need  -     Fluzone High-Dose 65+yrs (2702-5512)    Appropriate health maintenance and prevention topics specific for this patient were discussed today.  Additionally, health goals, and health concerns addressed as appropriate.  Pt was encouraged to stay up to date on recommended screenings and vaccines based on USPSTF guidelines.     Type 2 diabetes: Her A1c has been maintained in prediabetic range.  Most recent A1c done in October was 6.1 which is up just a little bit from previous.  Will continue current metformin.  We did discuss possible addition of GLP-1 agonist for cardiovascular benefit.  This would also likely improve her lipid profile although not indicated for this.  We discussed blood sugar monitoring and diet.  Foot exam was done and no problems noted.  Will see if we can get a CGM for patient which I think would be helpful, however if not approved by insurance discussed that fasting blood sugars are not particularly helpful for her and I would prefer for her to focus on postprandials 2 hours with goal of less than 160.  We discussed carbohydrate management and keeping blood sugars more stable by additional protein especially with breakfast.  Patient will check into coverage for GLP-1 agonist and if this is covered and affordable I think this would be a good addition to patient's medication regimen and mainly and cut back on the amount of medicine she is taking.  She has no contraindications.  No refills needed today on metformin.  She does need urine micro which I have ordered and can get at her convenience with next labs.    Hypertension: Her blood pressure is elevated today and has not been taking amlodipine for couple weeks.  Discussed that there is no contraindication with amlodipine and oxycodone. I want her to restart the amlodipine.  If she prefers to take it in the morning, she can  certainly do so.  I have asked her to let me know how her blood pressures are in 2 weeks from restart.  No refills needed today.  Electrolytes and renal function were checked prior to recent surgery at the beginning of November.  I have reviewed these and other than elevated glucose they were normal.  Importance of blood pressure control discussed.  Discussed goal blood pressure readings 130/80 or less.  Needs urine for microalbumin.    Hyperlipidemia: Reviewed recent lipids which showed a total cholesterol of 240, triglycerides were elevated at 218 and LDL was 143.  These have been pretty stable over the last year.  Patient apparently failed 3 statins although I do not see a recent CK done.  Reviewed and discussed cardiology note with patient and recommendations.  I do not think a fibrate is necessarily going to be that helpful and would recommend either Praluent or Repatha.  Patient will check into insurance for coverage and let me know what she would like to try.  Once patient lets me know what medication she would like to try for hyperlipidemia, will order.    It was also recommended by cardiology that she get sleep study and referral has been placed.  Patient planning to get this scheduled.  Health implications of uncontrolled sleep apnea discussed.    Recommended vaccines discussed.         I spent 40 minutes caring for Alisha on this date of service. This time includes time spent by me in the following activities:preparing for the visit, reviewing tests, performing a medically appropriate examination and/or evaluation , counseling and educating the patient/family/caregiver, ordering medications, tests, or procedures, and documenting information in the medical record  Follow Up   No follow-ups on file.  Patient was given instructions and counseling regarding her condition or for health maintenance advice. Please see specific information pulled into the AVS if appropriate.              English

## 2023-11-27 NOTE — PROGRESS NOTES
"Chief Complaint  Diabetes (F/u dm ) and Hypertension (F/u htn )    Subjective    {Problem List  Visit Diagnosis   Encounters  Notes  Medications  Labs  Result Review Imaging  Media :23}    Alisha Connolly presents to [facility]  History of Present Illness    Objective   Vital Signs:  /78   Pulse 78   Temp 97.8 °F (36.6 °C)   Ht 160 cm (63\")   Wt 94.8 kg (209 lb)   SpO2 97%   BMI 37.02 kg/m²   Estimated body mass index is 37.02 kg/m² as calculated from the following:    Height as of this encounter: 160 cm (63\").    Weight as of this encounter: 94.8 kg (209 lb).               Physical Exam   Result Review :{Labs  Result Review  Imaging  Med Tab  Media  Procedures :23}  {The following data was reviewed by (Optional):57450}  {Ambulatory Labs (Optional):99099}  {Data reviewed (Optional):20334:::1}             Assessment and Plan {CC Problem List  Visit Diagnosis   ROS  Review (Popup)  Health Maintenance  Quality  BestPractice  Medications  SmartSets  SnapShot Encounters  Media :23}  There are no diagnoses linked to this encounter.       {Time Spent (Optional):39808}  Follow Up {Instructions Charge Capture  Follow-up Communications :23}  No follow-ups on file.  Patient was given instructions and counseling regarding her condition or for health maintenance advice. Please see specific information pulled into the AVS if appropriate.         "

## 2023-12-01 ENCOUNTER — TELEPHONE (OUTPATIENT)
Dept: SURGERY | Facility: CLINIC | Age: 63
End: 2023-12-01
Payer: MEDICARE

## 2023-12-01 NOTE — TELEPHONE ENCOUNTER
Pt called, said she had not received a call back from yesterday. Said she called and spoke with [facility] and was told they would send clinical pool a message about pt's stitches. Told pt we never received a call or message.  Pt c/o dangling stitch and yellow drainage. Pt says the right side is very tender and that is where the stitch is located. Pt has POV scheduled for Monday. Told pt that the stitch could be clipped at the surface level of skin. Explained that yellow drainage is mucous drainage that is wnl. Told pt to just keep appt for Monday and call with any other symptoms or complications. Pt understood.

## 2023-12-04 ENCOUNTER — OFFICE VISIT (OUTPATIENT)
Dept: SURGERY | Facility: CLINIC | Age: 63
End: 2023-12-04
Payer: MEDICARE

## 2023-12-04 VITALS
BODY MASS INDEX: 36.41 KG/M2 | HEIGHT: 63 IN | HEART RATE: 76 BPM | WEIGHT: 205.5 LBS | SYSTOLIC BLOOD PRESSURE: 140 MMHG | OXYGEN SATURATION: 98 % | DIASTOLIC BLOOD PRESSURE: 68 MMHG

## 2023-12-04 DIAGNOSIS — K60.2 ANAL FISSURE: Primary | ICD-10-CM

## 2023-12-04 DIAGNOSIS — K64.8 INTERNAL HEMORRHOIDS WITH COMPLICATION: ICD-10-CM

## 2023-12-04 DIAGNOSIS — K64.4 EXTERNAL HEMORRHOIDS WITH COMPLICATION: ICD-10-CM

## 2023-12-04 PROCEDURE — 3078F DIAST BP <80 MM HG: CPT | Performed by: PHYSICIAN ASSISTANT

## 2023-12-04 PROCEDURE — 1160F RVW MEDS BY RX/DR IN RCRD: CPT | Performed by: PHYSICIAN ASSISTANT

## 2023-12-04 PROCEDURE — 3077F SYST BP >= 140 MM HG: CPT | Performed by: PHYSICIAN ASSISTANT

## 2023-12-04 PROCEDURE — 99024 POSTOP FOLLOW-UP VISIT: CPT | Performed by: PHYSICIAN ASSISTANT

## 2023-12-04 PROCEDURE — 1159F MED LIST DOCD IN RCRD: CPT | Performed by: PHYSICIAN ASSISTANT

## 2023-12-04 NOTE — PROGRESS NOTES
"Alisha Connolly is a 63 y.o. female in for follow up of anal fissure, internal and external hemorrhoids status post chemical sphincterotomy with Botox and hemorrhoidectomy on 11/13/2023.    Pain, bleeding, and mucus drainage are improving over time. Still having some tenderness on the right side and wants to make sure there's no infection.     /68 (BP Location: Left arm, Patient Position: Sitting, Cuff Size: Adult)   Pulse 76   Ht 160 cm (63\")   Wt 93.2 kg (205 lb 8 oz)   LMP  (LMP Unknown)   SpO2 98%   Breastfeeding No   BMI 36.40 kg/m²   Body mass index is 36.4 kg/m².  -  Physical Exam  No acute distress  Chaperone present  Perianal exam: surgical site is healing well without any erythema or drainage. One dangling suture present.     Assessment:   1. Anal fissure    2. Internal hemorrhoids with complication    3. External hemorrhoids with complication    - status post chemical sphincterotomy with Botox and hemorrhoidectomy on 11/13/2023    Plan:  Clipped dangling suture  Follow up as needed        Rosalind Richard PA-C  Physician Assistant  Colorectal Surgery    "

## 2023-12-06 ENCOUNTER — HOSPITAL ENCOUNTER (OUTPATIENT)
Dept: DIABETES SERVICES | Facility: HOSPITAL | Age: 63
Discharge: HOME | End: 2023-12-06
Admitting: INTERNAL MEDICINE
Payer: MEDICARE

## 2023-12-06 PROCEDURE — 97802 MEDICAL NUTRITION INDIV IN: CPT

## 2023-12-06 NOTE — CONSULTS
Alisha was seen today by Registered Dietitian for Diabetes Education. Consistent with the ADA’s standards of care, a comprehensive assessment/training record has been sent to medical records (see media tab).    Reports diagnosis in 2016. Checks blood sugar and on metformin. ADA goals reviewed. Had knee replacement and is working on activity again, used to go to [facility]. Consistent carbohydrate diet/carb counting discussed. Encouraged limiting saturated fats.Labels, portions, meal planning discussed. Patient verbalized understanding of all information reviewed at today’s visit.     Alisha has been encouraged to call our office with questions or additional education needs. Please place referral for additional services or follow-up should need arise.    Thank you for the referral.

## 2024-01-24 ENCOUNTER — TELEPHONE (OUTPATIENT)
Dept: FAMILY MEDICINE CLINIC | Facility: CLINIC | Age: 64
End: 2024-01-24
Payer: MEDICARE

## 2024-01-24 ENCOUNTER — TELEMEDICINE (OUTPATIENT)
Dept: FAMILY MEDICINE CLINIC | Facility: TELEHEALTH | Age: 64
End: 2024-01-24
Payer: MEDICARE

## 2024-01-24 DIAGNOSIS — J22 LOWER RESPIRATORY INFECTION (E.G., BRONCHITIS, PNEUMONIA, PNEUMONITIS, PULMONITIS): Primary | ICD-10-CM

## 2024-01-24 RX ORDER — OLOPATADINE HYDROCHLORIDE 2 MG/ML
1 SOLUTION/ DROPS OPHTHALMIC DAILY
Qty: 2.5 ML | Refills: 0 | Status: SHIPPED | OUTPATIENT
Start: 2024-01-24 | End: 2024-02-23

## 2024-01-24 RX ORDER — ALBUTEROL SULFATE 90 UG/1
2 AEROSOL, METERED RESPIRATORY (INHALATION) EVERY 4 HOURS PRN
Qty: 18 G | Refills: 0 | Status: SHIPPED | OUTPATIENT
Start: 2024-01-24 | End: 2024-02-23

## 2024-01-24 RX ORDER — AZITHROMYCIN 250 MG/1
TABLET, FILM COATED ORAL
Qty: 6 TABLET | Refills: 0 | Status: SHIPPED | OUTPATIENT
Start: 2024-01-24

## 2024-01-24 RX ORDER — BENZONATATE 200 MG/1
200 CAPSULE ORAL 3 TIMES DAILY PRN
Qty: 30 CAPSULE | Refills: 0 | Status: SHIPPED | OUTPATIENT
Start: 2024-01-24 | End: 2024-02-03

## 2024-01-24 NOTE — PROGRESS NOTES
"Chief Complaint   Patient presents with    Cough       Video Visit Reason:   Free Text Description: Cough, eyes constant watering itching and crusty, chest feels heavy, tired  Subjective   Alisha Connolly is a 64 y.o. female.     History of Present Illness  Cough, chest heaviness, fatigue for about 2 weeks and feeling worse. She complains of both eyes watering and crusting over the last few days. She has been using an old Inhaler which is  but using since yesterday. She says that she does not have Asthma but when she get sick or sometimes with allergies she will need inhaler. She has heard \"rattling\" in the chest with cough. Chest does not hurt but does feel heavy. She called her Dr. Office and couldn't be seen. They recommended virtual visit. She is diabetic. She has tried Cough drops and tylenol. The albuterol is making her feel jittery.   Cough  This is a new problem. The current episode started 1 to 4 weeks ago. The problem has been worse. The problem occurs every few minutes. Associated symptoms include postnasal drip, a sore throat, shortness of breath and wheezing. Pertinent negatives include no chills, fever or headaches. She has tried OTC cough suppressant and a beta-agonist inhaler for the symptoms. The treatment provided no relief. Her past medical history is significant for bronchitis and environmental allergies.       The following portions of the patient's history were reviewed and updated as appropriate: allergies, current medications, past medical history, and problem list.      Past Medical History:   Diagnosis Date    Abnormal Pap smear of cervix     Age-related nuclear cataract of both eyes 2020    Anal fissure 10/2021    Anal skin tag 2022    S/P EXCISION    Ankle fracture 2022    RIGHT    Arthrodesis status 2014    Asthma     Benign essential hypertension 2015    Continue lisinopril    Carpal tunnel syndrome     PING    Cervical spondylolysis     Chronic neck pain " 2016    Closed nondisplaced fracture of lateral malleolus of right fibula 12/10/2022    NO SURGERY, SEEN AT [facility]    Cystitis 2017    Dry eye syndrome 2020    Environmental allergies     Exogenous hypertriglyceridemia 2017    Ganglion cyst of finger 2020    Hemorrhoids     History of COVID-19 2022    History of torn meniscus of left knee     Hormone replacement therapy (HRT)     Hyperchylomicronemia     Hyperlipidemia 2015    ASCVD 10-year risk ~10% Plan: · Start statin. Unclear whether arm numbness really 2/2 statin in the past.    Hyperopia of both eyes 2020    Keratoconjunctivitis sicca 2020    Knee pain, left     Lichen sclerosus     Lung nodule 2006    Obstructive sleep apnea syndrome 2004    Not using CPAP due to eye issues    Palpitations     Pertussis 2016    [facility]    PNA (pneumonia)     PONV (postoperative nausea and vomiting)     Presbyopia 2020    Rotator cuff injury 2011    SEEN AT [facility]    Seasonal allergies     Slow to wake up after anesthesia     Snoring     Type 2 diabetes mellitus without complication 2015    Under good control Plan: · Reassured about use of metformin    Urinary urgency 2021     Social History     Socioeconomic History    Marital status:    Tobacco Use    Smoking status: Former     Packs/day: 0.25     Years: 4.50     Additional pack years: 0.00     Total pack years: 1.13     Types: Cigarettes     Quit date: 3/7/2014     Years since quittin.8     Passive exposure: Past    Smokeless tobacco: Never   Vaping Use    Vaping Use: Never used   Substance and Sexual Activity    Alcohol use: Not Currently     Comment: occ    Drug use: Never    Sexual activity: Defer     Partners: Male     Birth control/protection: Post-menopausal, Hysterectomy     medication documentation: reviewed and updated with patient and   Current Outpatient Medications:     acetaminophen (TYLENOL) 500 MG tablet, Take  1 tablet by mouth Every 6 (Six) Hours As Needed for Mild Pain. Indications: Pain, Disp: , Rfl:     albuterol sulfate  (90 Base) MCG/ACT inhaler, Inhale 2 puffs Every 4 (Four) Hours As Needed. Indications: Asthma, Disp: , Rfl:     amLODIPine (NORVASC) 5 MG tablet, Take 1 tablet by mouth Every Night. Indications: High Blood Pressure Disorder, Disp: 90 tablet, Rfl: 1    cloNIDine (CATAPRES) 0.1 MG tablet, Take 1 tablet by mouth 2 (Two) Times a Day As Needed for High Blood Pressure (Systolic BP > 180, Diastolic BP > 110)., Disp: 15 tablet, Rfl: 0    Lancets (OneTouch Delica Plus [id number]) misc, 1 each by Other route Daily., Disp: , Rfl:     metFORMIN (GLUCOPHAGE) 500 MG tablet, TAKE 1 TABLET BY MOUTH TWICE DAILY WITH MEALS, Disp: 180 tablet, Rfl: 1    olmesartan (BENICAR) 40 MG tablet, Take 1 tablet by mouth Daily. Indications: High Blood Pressure Disorder, Disp: 90 tablet, Rfl: 1    OneTouch Ultra test strip, 1 each by Other route Daily., Disp: , Rfl:     albuterol sulfate  (90 Base) MCG/ACT inhaler, Inhale 2 puffs Every 4 (Four) Hours As Needed for Wheezing or Shortness of Air for up to 30 days., Disp: 18 g, Rfl: 0    azithromycin (Zithromax Z-Bernardo) 250 MG tablet, Take 2 tabs on Day 1, then 1 tab daily for 4 additional days, Disp: 6 tablet, Rfl: 0    benzonatate (TESSALON) 200 MG capsule, Take 1 capsule by mouth 3 (Three) Times a Day As Needed for Cough for up to 10 days., Disp: 30 capsule, Rfl: 0    olopatadine (PATADAY) 0.2 % solution ophthalmic solution, Administer 1 drop to both eyes Daily for 30 days., Disp: 2.5 mL, Rfl: 0    polyethylene glycol (MIRALAX) 17 g packet, Take 17 g by mouth 2 (Two) Times a Day., Disp: , Rfl:   Review of Systems   Constitutional:  Positive for fatigue. Negative for chills and fever.   HENT:  Positive for postnasal drip, sinus pressure, sore throat and voice change. Negative for sinus pain.    Respiratory:  Positive for cough, chest tightness, shortness of breath and  wheezing.    Allergic/Immunologic: Positive for environmental allergies.   Neurological:  Negative for dizziness, light-headedness and headaches.       Objective   Physical Exam  Constitutional:       General: She is not in acute distress.     Appearance: She is ill-appearing.   HENT:      Mouth/Throat:      Pharynx: Oropharynx is clear.   Eyes:      Conjunctiva/sclera: Conjunctivae normal.   Pulmonary:      Effort: Pulmonary effort is normal.      Comments: Cough and hoarseness  Neurological:      Mental Status: She is alert.   Psychiatric:         Mood and Affect: Mood normal.         Assessment & Plan   Diagnoses and all orders for this visit:    1. Lower respiratory infection (e.g., bronchitis, pneumonia, pneumonitis, pulmonitis) (Primary)  -     benzonatate (TESSALON) 200 MG capsule; Take 1 capsule by mouth 3 (Three) Times a Day As Needed for Cough for up to 10 days.  Dispense: 30 capsule; Refill: 0  -     albuterol sulfate  (90 Base) MCG/ACT inhaler; Inhale 2 puffs Every 4 (Four) Hours As Needed for Wheezing or Shortness of Air for up to 30 days.  Dispense: 18 g; Refill: 0  -     azithromycin (Zithromax Z-Bernardo) 250 MG tablet; Take 2 tabs on Day 1, then 1 tab daily for 4 additional days  Dispense: 6 tablet; Refill: 0  -     olopatadine (PATADAY) 0.2 % solution ophthalmic solution; Administer 1 drop to both eyes Daily for 30 days.  Dispense: 2.5 mL; Refill: 0                    Follow Up:  If your symptoms are not resolving by the completion of your treatment or are worsening, see your primary care provider for follow up. If you don't have a primary care provider, you may go to any Urgent Care for re-evaluation. If you develop any life threatening symptoms, go to the nearest Emergency Department immediately or call EMS.               The use of  Video Visit was utilized during this visit, using both [employer] and [employer]. The use of a video visit has been reviewed with the patient and verbal informed  consent has been obtained. No technical difficulties occurred during the visit.    is located at [address] [place] [id number]  Provider is located at [place], [place]

## 2024-01-29 DIAGNOSIS — I10 BENIGN ESSENTIAL HYPERTENSION: ICD-10-CM

## 2024-01-29 RX ORDER — AMLODIPINE BESYLATE 5 MG/1
TABLET ORAL
Qty: 90 TABLET | Refills: 1 | Status: SHIPPED | OUTPATIENT
Start: 2024-01-29

## 2024-01-29 NOTE — TELEPHONE ENCOUNTER
Rx Refill Note  Requested Prescriptions     Pending Prescriptions Disp Refills    amLODIPine (NORVASC) 5 MG tablet [Pharmacy Med Name: AMLODIPINE BESYLATE 5MG TABLETS] 90 tablet 1     Sig: TAKE 1 TABLET BY MOUTH EVERY NIGHT FOR HIGH BLOOD PRESSURE      Last office visit with prescribing clinician: 11/27/2023   Last telemedicine visit with prescribing clinician: 8/10/2023   Next office visit with prescribing clinician: Visit date not found                         Would you like a call back once the refill request has been completed: [] Yes [] No    If the office needs to give you a call back, can they leave a voicemail: [] Yes [] No    Killian Drake MA  01/29/24, 13:35 EST

## 2024-02-01 DIAGNOSIS — I10 BENIGN ESSENTIAL HYPERTENSION: ICD-10-CM

## 2024-02-01 RX ORDER — OLMESARTAN MEDOXOMIL 40 MG/1
40 TABLET ORAL DAILY
Qty: 90 TABLET | Refills: 1 | Status: SHIPPED | OUTPATIENT
Start: 2024-02-01

## 2024-02-01 NOTE — TELEPHONE ENCOUNTER
Rx Refill Note  Requested Prescriptions     Pending Prescriptions Disp Refills    olmesartan (BENICAR) 40 MG tablet [Pharmacy Med Name: OLMESARTAN MEDOXOMIL 40MG TABLETS] 90 tablet 1     Sig: TAKE 1 TABLET BY MOUTH DAILY FOR HIGH BLOOD PRESSURE      Last office visit with prescribing clinician: 11/27/2023   Last telemedicine visit with prescribing clinician: 8/10/2023   Next office visit with prescribing clinician: Visit date not found                         Would you like a call back once the refill request has been completed: [] Yes [] No    If the office needs to give you a call back, can they leave a voicemail: [] Yes [] No    Killian Drake MA  02/01/24, 10:50 EST

## 2024-02-02 RX ORDER — OFLOXACIN 3 MG/ML
2 SOLUTION/ DROPS OPHTHALMIC 4 TIMES DAILY
Qty: 10 ML | Refills: 0 | Status: SHIPPED | OUTPATIENT
Start: 2024-02-02 | End: 2024-02-09

## 2024-02-02 RX ORDER — METHYLPREDNISOLONE 4 MG/1
TABLET ORAL
Qty: 1 EACH | Refills: 0 | Status: SHIPPED | OUTPATIENT
Start: 2024-02-02

## 2024-02-22 ENCOUNTER — OFFICE VISIT (OUTPATIENT)
Dept: SLEEP MEDICINE | Facility: HOSPITAL | Age: 64
End: 2024-02-22
Payer: MEDICARE

## 2024-02-22 VITALS — BODY MASS INDEX: 36.68 KG/M2 | OXYGEN SATURATION: 96 % | HEART RATE: 50 BPM | WEIGHT: 207 LBS | HEIGHT: 63 IN

## 2024-02-22 DIAGNOSIS — E66.01 CLASS 2 SEVERE OBESITY DUE TO EXCESS CALORIES WITH SERIOUS COMORBIDITY AND BODY MASS INDEX (BMI) OF 36.0 TO 36.9 IN ADULT: ICD-10-CM

## 2024-02-22 DIAGNOSIS — G47.33 OSA (OBSTRUCTIVE SLEEP APNEA): Primary | ICD-10-CM

## 2024-02-22 PROCEDURE — G0463 HOSPITAL OUTPT CLINIC VISIT: HCPCS

## 2024-02-22 NOTE — PROGRESS NOTES
[facility] New Patient/Consultation       Reason for Consultation: RAMU    Patient Care Team:  Aarti Ogden APRN as PCP - General (Nurse Practitioner)  Sarai Perez MD as Consulting Physician (Colon and Rectal Surgery)  Inna Beaver MD as Consulting Physician (Cardiology)  Tomás Rasmussen MD as Consulting Physician (Orthopedic Surgery)  Alvaro Webster MD as Consulting Physician (Sleep Medicine)    Chief complaint: RAMU    History of present illness:    Thank you for asking me to see your patient.  The patient is a 64 y.o. female who I was asked to evaluate by cardiology.  The patient does have obstructive sleep apnea diagnosed by sleep study in [place], [place] 30 years ago.  The patient used CPAP for about 1 year and she has not treated her obstructive sleep apnea for 29 years.  The patient is followed by cardiology for palpitations, essential hypertension, and hyperlipidemia.    The patient goes to bed around midnight and gets out of bed between 7:30 AM and 8 AM.  She falls asleep within minutes and she feels good upon arising.  She has no complaints of hypersomnolence and her [place] Sleepiness Scale is normal at 4.  The patient reports her maximum weight to be 265 pounds and today she weighs 207 pounds.  She has no significant complaints of snoring and she denies witnessed apnea or awaken gasping for breath or awakening coughing or choking.  She does not have morning headaches or dry mouth.  She sweats at night when she is hot.  She denies restless sleep.  She can sleep all night occasionally but she will also use the restroom once or twice during the nighttime.    Review of Systems:    A complete review of systems was done and all were negative with the exception of the above    History:  Past Medical History:   Diagnosis Date    Abnormal Pap smear of cervix     Age-related nuclear cataract of both eyes 02/03/2020    Anal fissure 10/2021    Anal skin tag 08/2022    S/P EXCISION     Ankle fracture 2022    RIGHT    Arthrodesis status 2014    Asthma     Benign essential hypertension 2015    Continue lisinopril    Carpal tunnel syndrome     PING    Cervical spondylolysis     Chronic neck pain 2016    Closed nondisplaced fracture of lateral malleolus of right fibula 12/10/2022    NO SURGERY, SEEN AT [facility]    Cystitis 2017    Dry eye syndrome 2020    Environmental allergies     Exogenous hypertriglyceridemia 2017    Ganglion cyst of finger 2020    Hemorrhoids     History of COVID-19 2022    History of torn meniscus of left knee     Hormone replacement therapy (HRT)     Hyperchylomicronemia     Hyperlipidemia 2015    ASCVD 10-year risk ~10% Plan: · Start statin. Unclear whether arm numbness really 2/2 statin in the past.    Hyperopia of both eyes 2020    Keratoconjunctivitis sicca 2020    Knee pain, left     Lichen sclerosus     Lung nodule 2006    Obstructive sleep apnea syndrome 2004    Not using CPAP due to eye issues    Palpitations     Pertussis 2016    [facility]    PNA (pneumonia)     PONV (postoperative nausea and vomiting)     Presbyopia 2020    Rotator cuff injury 2011    SEEN AT [facility]    Seasonal allergies     Slow to wake up after anesthesia     Snoring     Type 2 diabetes mellitus without complication 2015    Under good control Plan: · Reassured about use of metformin    Urinary urgency 2021   ,   Past Surgical History:   Procedure Laterality Date    ANTERIOR CERVICAL DISCECTOMY W/ FUSION N/A 2014    C6-7, DR. ALEXEY IZAGUIRRE AT [facility]. TITANIUM PLATE    ANUS SURGERY N/A 2022    Procedure: ANAL TAG EXCISION;  Surgeon: Sarai Perez MD;  Location: [facility] ENDOSCOPY;  Service: General;  Laterality: N/A;  pre-anal tag  post-same    CARPAL TUNNEL RELEASE Bilateral      SECTION          COLONOSCOPY N/A     COLONOSCOPY N/A 2022    EXTERNAL AND INTERNAL  HEMORRHOIDS, GRADE 3-BANDED, RESCOPE IN 10 YRS, DR. SARAI SANTIAGO AT [facility]    HEMORRHOIDECTOMY N/A 2022    Procedure: HEMORRHOID BANDING x3;  Surgeon: Sarai Santiago MD;  Location: [facility] ENDOSCOPY;  Service: General;  Laterality: N/A;  pre-hemorrhoids  post-same    HYSTERECTOMY N/A     WITH BSO, FOR HEAVY BLEEDING    INCISIONAL BREAST BIOPSY Right     BENIGN    LAPAROSCOPIC LYSIS OF ADHESIONS N/A     OOPHORECTOMY      OOPHORECTOMY      SPHINCTEROTOMY N/A 2023    Procedure: EXAM UNDER ANESTHESIA, CHEMICAL SPHINCTEROTOMY WITH BOTOX;  Surgeon: Sarai Santiago MD;  Location: [facility] MAIN OR;  Service: General;  Laterality: N/A;    SPHINCTEROTOMY N/A 2023    Procedure: chemical sphincterotomy with Botox and hemorrhoidectomy;  Surgeon: Sarai Santiago MD;  Location: [facility] MAIN OR;  Service: General;  Laterality: N/A;    TOTAL KNEE ARTHROPLASTY Left 2023    Procedure: LEFT TOTAL KNEE ARTHROPLASTY WITH JACE NAVIGATION;  Surgeon: Tomás Rasmussen MD;  Location: [facility] OR [facility];  Service: Orthopedics;  Laterality: Left;   ,   Family History   Problem Relation Age of Onset    Arthritis Mother     Heart disease Mother     Arrhythmia Mother     Hypertension Father     Heart attack Father     Hyperlipidemia Father     Diabetes Father     Heart disease Father     Coronary artery disease Father     Diabetes Sister     Diabetes Paternal Grandmother     Cancer Cousin     Breast cancer Cousin     Cancer Maternal Great-Grandmother     Ovarian cancer Maternal Great-Grandmother     Malig Hyperthermia Neg Hx    , and   Social History     Socioeconomic History    Marital status:    Tobacco Use    Smoking status: Former     Packs/day: 0.25     Years: 4.50     Additional pack years: 0.00     Total pack years: 1.13     Types: Cigarettes     Quit date: 3/7/2014     Years since quittin.9     Passive exposure: Past    Smokeless tobacco: Never   Vaping Use    Vaping Use: Never used   Substance and  "Sexual Activity    Alcohol use: Not Currently     Comment: 1/week    Drug use: Never    Sexual activity: Defer     Partners: Male     Birth control/protection: Post-menopausal, Hysterectomy     E-cigarette/Vaping    E-cigarette/Vaping Use Never User     Passive Exposure No     Counseling Given Yes      E-cigarette/Vaping Substances    Nicotine No     THC No     CBD No     Flavoring No      E-cigarette/Vaping Devices    Disposable No     Pre-filled or Refillable Cartridge No     Refillable Tank No     Pre-filled Pod No       Social History: She is retired.  1 coffee and 1 tea daily    Allergies:  Penicillins; Atorvastatin; Rosuvastatin; Latex; and Tetanus toxoid, adsorbed     Medication Review: Her list was reviewed.  She does take olmesartan and amlodipine for high blood pressure.    Vital Signs:    Vitals:    02/22/24 0900   Pulse: 50   SpO2: 96%   Weight: 93.9 kg (207 lb)   Height: 160 cm (63\")      Body mass index is 36.67 kg/m².  Neck Circumference: 14.75 inches      Physical Exam:    Constitutional:  Well developed 64 y.o. female that appears in no apparent distress.  Awake & oriented times 3.  Normal mood with normal recent and remote memory and normal judgement.  Eyes:  Conjunctivae normal.  Oropharynx: Moist mucous membranes without exudate and a large tongue that is scalloped and class III Mallampati airway.    Neck: Trachea midline  Respiratory: Effort is not labored  Cardiovascular: Radial pulse regular  Musculoskeletal: Gait appears normal, no digital clubbing evident, no pre-tibial edema    Results Review: I reviewed the cardiology note from Haylee SCHOFIELD dated 9/25/2023.    Impression:   The patient was diagnosed with obstructive sleep apnea 30 years ago.  The patient only used CPAP for 1 year.  The patient's maximum weight is 265 pounds and presently she weighs 207 pounds.  Presently she has no complaints suggestive of obstructive sleep apnea.  STOP-BANG administered and the patient scores 3/8 " suggesting intermediate risk of having obstructive sleep apnea    Plan:  Good sleep hygiene measures should be maintained.  Weight loss would be beneficial in this patient who has class II severe obesity by Body mass index is 36.67 kg/m².    Pathophysiology of RAMU described to the patient.  Cardiovascular complications of untreated RAMU also reviewed.  I also described how untreated RAMU can affect hypertension.    Based on the patient's past medical history and present cardiac comorbidities, and her STOP-BANG suggesting an intermediate risk of having obstructive sleep apnea, home sleep study will be performed to reevaluate presence of obstructive sleep apnea.  I described the procedure to her.  I answered all of her questions.    Patient has succeeded and significant weight loss and hopefully no significant obstructive sleep apnea will be identified.  Home sleep study will be scheduled and further recommendations will be made once those results are known.    Thank you for requesting me to assist in this patient's care.    Alvaro Webster MD  Sleep Medicine  02/25/24  10:42 EST

## 2024-02-25 PROBLEM — E66.812 CLASS 2 SEVERE OBESITY DUE TO EXCESS CALORIES WITH SERIOUS COMORBIDITY AND BODY MASS INDEX (BMI) OF 36.0 TO 36.9 IN ADULT: Status: ACTIVE | Noted: 2024-02-25

## 2024-02-25 PROBLEM — E66.01 CLASS 2 SEVERE OBESITY DUE TO EXCESS CALORIES WITH SERIOUS COMORBIDITY AND BODY MASS INDEX (BMI) OF 36.0 TO 36.9 IN ADULT: Status: ACTIVE | Noted: 2024-02-25

## 2024-03-04 ENCOUNTER — HOSPITAL ENCOUNTER (OUTPATIENT)
Dept: SLEEP MEDICINE | Facility: HOSPITAL | Age: 64
Discharge: HOME | End: 2024-03-04
Admitting: INTERNAL MEDICINE
Payer: MEDICARE

## 2024-03-04 DIAGNOSIS — G47.33 OSA (OBSTRUCTIVE SLEEP APNEA): ICD-10-CM

## 2024-03-04 PROCEDURE — 95806 SLEEP STUDY UNATT&RESP EFFT: CPT

## 2024-03-09 PROCEDURE — 95806 SLEEP STUDY UNATT&RESP EFFT: CPT | Performed by: INTERNAL MEDICINE

## 2024-03-13 ENCOUNTER — OFFICE VISIT (OUTPATIENT)
Dept: SLEEP MEDICINE | Facility: HOSPITAL | Age: 64
End: 2024-03-13
Payer: MEDICARE

## 2024-03-13 VITALS — WEIGHT: 210 LBS | HEIGHT: 63 IN | BODY MASS INDEX: 37.21 KG/M2 | HEART RATE: 66 BPM | OXYGEN SATURATION: 98 %

## 2024-03-13 DIAGNOSIS — G47.33 OSA (OBSTRUCTIVE SLEEP APNEA): Primary | ICD-10-CM

## 2024-03-13 DIAGNOSIS — E66.01 CLASS 2 SEVERE OBESITY DUE TO EXCESS CALORIES WITH SERIOUS COMORBIDITY AND BODY MASS INDEX (BMI) OF 37.0 TO 37.9 IN ADULT: ICD-10-CM

## 2024-03-13 PROCEDURE — G0463 HOSPITAL OUTPT CLINIC VISIT: HCPCS

## 2024-03-13 NOTE — PROGRESS NOTES
"Follow Up [facility] Disorders [facility] Note     Chief Complaint:  RAMU     Primary Care Physician: Aarti Ogden APRN    Interval History:   The patient is a 64 y.o. female  who I last saw 2/22/2024 and that note was reviewed.  The patient was previously diagnosed with obstructive sleep apnea around 30 years ago.  She used CPAP for about 1 year.  Due to hypertension and palpitations, reevaluation requested.  Home sleep study performed 3/4/2024 demonstrated mild obstructive sleep apnea without sleep-related hypoxia.  The patient snored 55% of total monitoring time.  The patient is here today for follow-up.    She reports she is unchanged because no therapy has been initiated.    Review of Systems:    A complete review of systems was done and all were negative with the exception of the above    Social History:    Social History     Socioeconomic History    Marital status:    Tobacco Use    Smoking status: Former     Current packs/day: 0.00     Average packs/day: 0.3 packs/day for 4.5 years (1.1 ttl pk-yrs)     Types: Cigarettes     Start date: 9/6/2009     Quit date: 3/7/2014     Years since quitting: 10.0     Passive exposure: Past    Smokeless tobacco: Never   Vaping Use    Vaping status: Never Used   Substance and Sexual Activity    Alcohol use: Not Currently     Comment: 1/week    Drug use: Never    Sexual activity: Defer     Partners: Male     Birth control/protection: Post-menopausal, Hysterectomy       Allergies:  Penicillins; Atorvastatin; Rosuvastatin; Latex; and Tetanus toxoid, adsorbed     Medication Review:  Reviewed.      Vital Signs:    Vitals:    03/13/24 1100   Pulse: 66   SpO2: 98%   Weight: 95.3 kg (210 lb)   Height: 160 cm (62.99\")     Body mass index is 37.21 kg/m².    Physical Exam:    Constitutional:  Well developed 64 y.o. female that appears in no apparent distress.  Awake & oriented times 3.  Normal mood with normal recent and remote memory and normal judgement.  Eyes:  Conjunctivae " normal.  Oropharynx: Previously, moist mucous membranes without exudate and a large tongue that is scalloped and class III Mallampati airway.    Self-administered [place] Sleepiness Scale test results: 3  0-5 Lower normal daytime sleepiness  6-10 Higher normal daytime sleepiness  11-12 Mild, 13-15 Moderate, & 16-24 Severe excessive daytime sleepiness    I reviewed the results of the home sleep study from 3/4/2024 with the patient.     Impression:   Mild obstructive sleep apnea by home sleep study 3/4/2024, weight 207 pounds.  No sleep-related hypoxia noted.  The patient snored 55% of total monitoring time.  The patient has no complaints of hypersomnolence.    Plan:  Good sleep hygiene measures should be maintained.  Weight loss would be beneficial in this patient who has class II severe obesity by Body mass index is 37.21 kg/m².      As stated, I reviewed all with the patient.  I reviewed the results of the home sleep study and I discussed treatment options of an oral appliance versus auto CPAP.  Due to the severity of the obstructive sleep apnea present, and due to the presence of no sleep-related hypoxia, the patient would like to be evaluated for an oral appliance.  I agree with her.  I reviewed all with her.  I explained to her that once she is wearing the oral appliance, follow-up home sleep study will be performed to prove efficacy of the device.    I answered all of the patient's questions.  The patient will call the [facility] for any problems and will follow up with home sleep study once she is wearing the oral appliance at the appropriate protrusion.        Alvaro Webster MD  Sleep Medicine  03/13/24  12:01 EDT

## 2024-03-17 PROBLEM — G47.33 OSA (OBSTRUCTIVE SLEEP APNEA): Status: ACTIVE | Noted: 2024-03-04

## 2024-04-16 DIAGNOSIS — E11.9 TYPE 2 DIABETES MELLITUS WITHOUT COMPLICATION, WITHOUT LONG-TERM CURRENT USE OF INSULIN: Chronic | ICD-10-CM

## 2024-05-03 DIAGNOSIS — I10 BENIGN ESSENTIAL HYPERTENSION: ICD-10-CM

## 2024-05-03 RX ORDER — OLMESARTAN MEDOXOMIL 40 MG/1
40 TABLET ORAL DAILY
Qty: 90 TABLET | Refills: 0 | Status: SHIPPED | OUTPATIENT
Start: 2024-05-03

## 2024-05-03 NOTE — TELEPHONE ENCOUNTER
Rx Refill Note  Requested Prescriptions     Pending Prescriptions Disp Refills    olmesartan (BENICAR) 40 MG tablet [Pharmacy Med Name: OLMESARTAN MEDOXOMIL 40MG TABLETS] 90 tablet 1     Sig: TAKE 1 TABLET BY MOUTH DAILY      Last office visit with prescribing clinician: 11/27/2023   Last telemedicine visit with prescribing clinician: Visit date not found   Next office visit with prescribing clinician: Visit date not found                         Would you like a call back once the refill request has been completed: [] Yes [] No    If the office needs to give you a call back, can they leave a voicemail: [] Yes [] No    Francisco Sanchez Rep  05/03/24, 09:45 EDT

## 2024-05-30 ENCOUNTER — OFFICE VISIT (OUTPATIENT)
Dept: FAMILY MEDICINE CLINIC | Facility: CLINIC | Age: 64
End: 2024-05-30
Payer: MEDICARE

## 2024-05-30 VITALS
WEIGHT: 211.9 LBS | OXYGEN SATURATION: 96 % | BODY MASS INDEX: 37.55 KG/M2 | HEIGHT: 63 IN | SYSTOLIC BLOOD PRESSURE: 132 MMHG | HEART RATE: 84 BPM | DIASTOLIC BLOOD PRESSURE: 64 MMHG

## 2024-05-30 DIAGNOSIS — E87.5 HYPERKALEMIA: ICD-10-CM

## 2024-05-30 DIAGNOSIS — I10 PRIMARY HYPERTENSION: ICD-10-CM

## 2024-05-30 DIAGNOSIS — E11.9 TYPE 2 DIABETES MELLITUS WITHOUT COMPLICATION, WITHOUT LONG-TERM CURRENT USE OF INSULIN: Primary | ICD-10-CM

## 2024-05-30 DIAGNOSIS — N64.4 BREAST PAIN, LEFT: ICD-10-CM

## 2024-05-30 PROCEDURE — 1160F RVW MEDS BY RX/DR IN RCRD: CPT | Performed by: NURSE PRACTITIONER

## 2024-05-30 PROCEDURE — 3078F DIAST BP <80 MM HG: CPT | Performed by: NURSE PRACTITIONER

## 2024-05-30 PROCEDURE — 99214 OFFICE O/P EST MOD 30 MIN: CPT | Performed by: NURSE PRACTITIONER

## 2024-05-30 PROCEDURE — 1159F MED LIST DOCD IN RCRD: CPT | Performed by: NURSE PRACTITIONER

## 2024-05-30 PROCEDURE — 3044F HG A1C LEVEL LT 7.0%: CPT | Performed by: NURSE PRACTITIONER

## 2024-05-30 PROCEDURE — 3075F SYST BP GE 130 - 139MM HG: CPT | Performed by: NURSE PRACTITIONER

## 2024-05-30 PROCEDURE — 1126F AMNT PAIN NOTED NONE PRSNT: CPT | Performed by: NURSE PRACTITIONER

## 2024-05-30 RX ORDER — LANCETS 33 GAUGE
1 EACH MISCELLANEOUS DAILY
Qty: 100 EACH | Refills: 6 | Status: SHIPPED | OUTPATIENT
Start: 2024-05-30

## 2024-05-30 RX ORDER — BLOOD SUGAR DIAGNOSTIC
1 STRIP MISCELLANEOUS DAILY
Qty: 400 EACH | Refills: 1 | Status: SHIPPED | OUTPATIENT
Start: 2024-05-30

## 2024-05-30 NOTE — PROGRESS NOTES
Chief Complaint  Hypertension (Follow up )    Subjective        Alisha Connolly presents to [facility]  History of Present Illness    History of Present Illness  The patient is a 62-year-old female who presents for a diabetes check and prescription refill.    The patient was last evaluated in 12/2023 and is not currently on any steroids or Z-Bernardo. She reports that her diabetes has been well-managed, however, she experiences symptoms if she misses a dose of her metformin medication. During her last visit in 12/2023, she was prescribed 2 to 3 different medications, one of which costed approximately 600 per month, prompting her to discontinue them. Her current prescription plan, covered by her Medicare, covers Ozempic. Despite not meeting her deductible yet, she believes her diabetes is well-managed. However, she believes her blood glucose levels have been slightly elevated since her knee replacement, which she attributes to dietary intake, and activity.     Her fasting blood glucose level this morning was 154. She acknowledges the need for better glucose monitoring via fingerstick, but admits to inconsistent glucose monitoring due to her busy schedule. She has observed a correlation between her fasting blood glucose levels and her need to monitor her diet. There have been instances where her blood glucose has reached 200. She typically takes her medication with breakfast. If her blood glucose levels are elevated upon waking or when she feels unwell, she consumes food, takes her medication, and reclines, which results in decrease in her blood glucose levels. Her largest meal is typically dinner. She recalls a single instance of her blood glucose exceeding 300 during a hospital stay, for which she received insulin. She is nearing the end of her OneTouch glucose meter and lancets and is requesting refills.    The patient reports discomfort in her left breast, which she describes as feeling as  "though there is something beneath her nipple. The pain occasionally radiates. She has not reported this issue to her gynecologist. Her last mammogram was conducted in 10/2023. She has an appointment scheduled with a new gynecologist early next month.  She denies any nipple discharge. She has not changed her bra or engaged in any new activities such as yard work or lifting. She has previously undergone an ultrasound of her breast at [place]. She underwent a benign tumor removal from her left breast at the age of 20, necessitating annual mammograms.   She denies any family history of breast cancer.       Objective   Vital Signs:  /64 (BP Location: Right arm, Patient Position: Sitting, Cuff Size: Large Adult)   Pulse 84   Ht 160 cm (62.99\")   Wt 96.1 kg (211 lb 14.4 oz)   SpO2 96%   BMI 37.55 kg/m²   Estimated body mass index is 37.55 kg/m² as calculated from the following:    Height as of this encounter: 160 cm (62.99\").    Weight as of this encounter: 96.1 kg (211 lb 14.4 oz).               Physical Exam  Vitals and nursing note reviewed.   Constitutional:       General: She is not in acute distress.     Appearance: She is well-developed. She is not ill-appearing or diaphoretic.   HENT:      Head: Normocephalic and atraumatic.   Eyes:      General:         Right eye: No discharge.         Left eye: No discharge.      Conjunctiva/sclera: Conjunctivae normal.   Cardiovascular:      Rate and Rhythm: Normal rate and regular rhythm.      Heart sounds: Normal heart sounds.   Pulmonary:      Effort: Pulmonary effort is normal.      Breath sounds: Normal breath sounds.   Chest:   Breasts:     Right: Normal.      Left: Normal.   Abdominal:      General: Bowel sounds are normal.      Palpations: Abdomen is soft.   Musculoskeletal:         General: No deformity.      Comments: Gait smooth and steady   Lymphadenopathy:      Upper Body:      Left upper body: No supraclavicular, axillary or pectoral adenopathy.   Skin:   "   General: Skin is warm and dry.   Neurological:      General: No focal deficit present.      Mental Status: She is alert and oriented to person, place, and time.   Psychiatric:         Mood and Affect: Mood normal.         Behavior: Behavior normal.          Physical Exam       Result Review :            Results  Laboratory Studies  Fasting blood sugar 154.                Assessment and Plan     Diagnoses and all orders for this visit:    1. Type 2 diabetes mellitus without complication, without long-term current use of insulin (Primary)  -     Comprehensive Metabolic Panel  -     Hemoglobin A1c    2. Breast pain, left  -     Mammo Diagnostic Digital Tomosynthesis Left With CAD; Future  -     US breast left complete; Future    3. Primary hypertension  -     Comprehensive Metabolic Panel    4. Hyperkalemia  -     Basic Metabolic Panel; Future    Other orders  -     OneTouch Ultra test strip; 1 each by Other route Daily.  Dispense: 400 each; Refill: 1  -     Lancets (OneTouch Delica Plus [id number]) misc; 1 each by Other route Daily.  Dispense: 100 each; Refill: 6        Assessment & Plan  1. Diabetes Mellitus.  The patient's blood pressure readings are within the normal range. The patient was informed that while metformin does not lower blood glucose levels, it aids in enhancing the absorption of glucose into the cells and may help increase the time when insulin may need to be started.  She was advised to monitor her blood glucose levels 2 hours post-dinner, with a target blood glucose level of less than 160. An A1c test will be conducted today. Prescriptions for OneTouch glucose meter and lancets were provided. Should her A1c remain at the desired range, there will be no need to alter her treatment plan. If her postprandial blood glucose levels exceed 200, she was advised to reduce her portion sizes or limit her carbohydrate intake.    2. Pain in the left breast.  Upon examination, no abnormalities were detected. An  ultrasound of the left breast and diagnostic mammogram has been ordered.    3.  Hypertension-controlled on current medication-continue-Labs to monitor renal function and electrolytes.  No refills needed today.            Follow Up     No follow-ups on file.  Patient was given instructions and counseling regarding her condition or for health maintenance advice. Please see specific information pulled into the AVS if appropriate.    Patient or patient representative verbalized consent for the use of Ambient Listening during the visit with  KANWAL Barker for chart documentation. 6/13/2024  06:53 EDT

## 2024-06-01 LAB
ALBUMIN SERPL-MCNC: 4.4 G/DL (ref 3.5–5.2)
ALBUMIN/GLOB SERPL: 2.3 G/DL
ALP SERPL-CCNC: 48 U/L (ref 39–117)
ALT SERPL-CCNC: 22 U/L (ref 1–33)
AST SERPL-CCNC: 24 U/L (ref 1–32)
BILIRUB SERPL-MCNC: 0.3 MG/DL (ref 0–1.2)
BUN SERPL-MCNC: 17 MG/DL (ref 8–23)
BUN/CREAT SERPL: 16 (ref 7–25)
CALCIUM SERPL-MCNC: 10 MG/DL (ref 8.6–10.5)
CHLORIDE SERPL-SCNC: 106 MMOL/L (ref 98–107)
CO2 SERPL-SCNC: 25.5 MMOL/L (ref 22–29)
CREAT SERPL-MCNC: 1.06 MG/DL (ref 0.57–1)
EGFRCR SERPLBLD CKD-EPI 2021: 58.1 ML/MIN/1.73
GLOBULIN SER CALC-MCNC: 1.9 GM/DL
GLUCOSE SERPL-MCNC: 146 MG/DL (ref 65–99)
HBA1C MFR BLD: 6.7 % (ref 4.8–5.6)
POTASSIUM SERPL-SCNC: 5.3 MMOL/L (ref 3.5–5.2)
PROT SERPL-MCNC: 6.3 G/DL (ref 6–8.5)
SODIUM SERPL-SCNC: 138 MMOL/L (ref 136–145)

## 2024-06-17 ENCOUNTER — TELEPHONE (OUTPATIENT)
Dept: FAMILY MEDICINE CLINIC | Facility: CLINIC | Age: 64
End: 2024-06-17

## 2024-06-17 NOTE — TELEPHONE ENCOUNTER
Caller: Alisha Connolly    Relationship to patient: Self    Best call back number:     Chief complaint:     Type of visit: LAB    Requested date: 06/17/24    If rescheduling, when is the original appointment:     Additional notes:PATIENT WANTS TO HAVE LABS TODAY.  I TRIED TO CALL THE OFFICE BUT GOT NO ANSWER.

## 2024-08-01 DIAGNOSIS — I10 BENIGN ESSENTIAL HYPERTENSION: ICD-10-CM

## 2024-08-01 RX ORDER — AMLODIPINE BESYLATE 5 MG/1
TABLET ORAL
Qty: 90 TABLET | Refills: 1 | Status: SHIPPED | OUTPATIENT
Start: 2024-08-01

## 2024-08-01 NOTE — TELEPHONE ENCOUNTER
Rx Refill Note  Requested Prescriptions     Pending Prescriptions Disp Refills    amLODIPine (NORVASC) 5 MG tablet [Pharmacy Med Name: AMLODIPINE BESYLATE 5MG TABLETS] 90 tablet 1     Sig: TAKE 1 TABLET BY MOUTH EVERY NIGHT FOR HIGH BLOOD PRESSURE      Last office visit with prescribing clinician: 5/30/2024   Last telemedicine visit with prescribing clinician: Visit date not found   Next office visit with prescribing clinician: Visit date not found                         Would you like a call back once the refill request has been completed: [] Yes [] No    If the office needs to give you a call back, can they leave a voicemail: [] Yes [] No    Francisco Sanchez Rep  08/01/24, 13:56 EDT

## 2024-08-11 ENCOUNTER — DOCUMENTATION (OUTPATIENT)
Dept: SLEEP MEDICINE | Facility: HOSPITAL | Age: 64
End: 2024-08-11
Payer: MEDICARE

## 2024-08-11 NOTE — PROGRESS NOTES
The patient was previously diagnosed with obstructive sleep apnea around 30 years ago. She used CPAP for about 1 year. Due to hypertension and palpitations, reevaluation requested. Home sleep study performed 3/4/2024 demonstrated mild obstructive sleep apnea without sleep-related hypoxia. The patient snored 55% of total monitoring time. The patient is here today for follow-up.     Based on the patient's home sleep study, an oral appliance evaluation initiated.  I received communication that the patient elected not to proceed with oral appliance.

## 2024-08-12 ENCOUNTER — TELEPHONE (OUTPATIENT)
Dept: SLEEP MEDICINE | Facility: HOSPITAL | Age: 64
End: 2024-08-12
Payer: MEDICARE

## 2024-09-30 ENCOUNTER — TELEPHONE (OUTPATIENT)
Dept: SLEEP MEDICINE | Facility: HOSPITAL | Age: 64
End: 2024-09-30
Payer: MEDICARE

## 2024-09-30 NOTE — TELEPHONE ENCOUNTER
Spoke with patient , she would like to proceed with CPAP , [facility] was not completely covered by insurance . Sending orders to [facility] , pt will schedule follow up once set up on CPAP

## 2024-10-03 ENCOUNTER — TELEPHONE (OUTPATIENT)
Dept: SLEEP MEDICINE | Facility: HOSPITAL | Age: 64
End: 2024-10-03
Payer: MEDICARE

## 2024-10-16 DIAGNOSIS — E11.9 TYPE 2 DIABETES MELLITUS WITHOUT COMPLICATION, WITHOUT LONG-TERM CURRENT USE OF INSULIN: Chronic | ICD-10-CM

## 2024-10-16 NOTE — TELEPHONE ENCOUNTER
Rx Refill Note  Requested Prescriptions     Pending Prescriptions Disp Refills    metFORMIN (GLUCOPHAGE) 500 MG tablet [Pharmacy Med Name: METFORMIN 500MG TABLETS] 180 tablet 1     Sig: TAKE 1 TABLET BY MOUTH TWICE DAILY WITH MEALS      Last office visit with prescribing clinician: 05/30/2024  Last telemedicine visit with prescribing clinician: Visit date not found   Next office visit with prescribing clinician: Visit date not found                         Would you like a call back once the refill request has been completed: [] Yes [] No    If the office needs to give you a call back, can they leave a voicemail: [] Yes [] No    Francisco Sanchez Rep  10/16/24, 09:51 EDT

## 2024-10-29 ENCOUNTER — OFFICE VISIT (OUTPATIENT)
Dept: FAMILY MEDICINE CLINIC | Facility: CLINIC | Age: 64
End: 2024-10-29
Payer: MEDICARE

## 2024-10-29 VITALS
OXYGEN SATURATION: 97 % | HEIGHT: 63 IN | SYSTOLIC BLOOD PRESSURE: 136 MMHG | WEIGHT: 218.5 LBS | HEART RATE: 78 BPM | TEMPERATURE: 97 F | BODY MASS INDEX: 38.71 KG/M2 | DIASTOLIC BLOOD PRESSURE: 88 MMHG | RESPIRATION RATE: 14 BRPM

## 2024-10-29 DIAGNOSIS — M25.50 POLYARTHRALGIA: ICD-10-CM

## 2024-10-29 DIAGNOSIS — G89.29 CHRONIC LEFT SHOULDER PAIN: ICD-10-CM

## 2024-10-29 DIAGNOSIS — M25.512 CHRONIC LEFT SHOULDER PAIN: ICD-10-CM

## 2024-10-29 DIAGNOSIS — E66.9 OBESITY (BMI 30-39.9): ICD-10-CM

## 2024-10-29 DIAGNOSIS — G47.33 OSA ON CPAP: ICD-10-CM

## 2024-10-29 DIAGNOSIS — E11.9 TYPE 2 DIABETES MELLITUS WITHOUT COMPLICATION, WITHOUT LONG-TERM CURRENT USE OF INSULIN: Primary | ICD-10-CM

## 2024-10-29 DIAGNOSIS — I10 BENIGN ESSENTIAL HYPERTENSION: ICD-10-CM

## 2024-10-29 DIAGNOSIS — E78.2 MIXED HYPERLIPIDEMIA: ICD-10-CM

## 2024-10-29 RX ORDER — PREDNISOLONE ACETATE 10 MG/ML
SUSPENSION/ DROPS OPHTHALMIC
COMMUNITY
Start: 2024-10-15

## 2024-10-29 RX ORDER — ESTRADIOL 10 UG/1
INSERT VAGINAL
COMMUNITY
Start: 2024-08-30

## 2024-10-29 RX ORDER — ESTRADIOL 0.05 MG/D
1 PATCH, EXTENDED RELEASE TRANSDERMAL 2 TIMES WEEKLY
COMMUNITY
Start: 2024-10-16

## 2024-10-29 RX ORDER — KETOROLAC TROMETHAMINE 5 MG/ML
SOLUTION OPHTHALMIC
COMMUNITY
Start: 2024-10-15

## 2024-10-29 NOTE — PROGRESS NOTES
Chief Complaint  Type 2 diabetes mellitus without complication, without long; Shoulder Pain; and Arthritis    Subjective        Alisha Connolly presents to [facility]  History of Present Illness    History of Present Illness  The patient is a 64-year-old female who presents for evaluation of multiple medical concerns.    She is here for a follow-up on her diabetes management. She has been monitoring her blood sugar levels, which have been slightly elevated, ranging from 150 to 160 an hour after meals, but dropping to 106 later. She has been adhering to her medication regimen.    She has been checking her blood pressure at home, which has been stable, typically around 130/88. She did not take her blood pressure medication today due to fasting for blood work. She has about 20 tablets of amlodipine left.    She started using a CPAP machine earlier this month, which has improved her energy levels. She was previously diagnosed as borderline for sleep apnea and was advised to use a mouthpiece, but she opted for the CPAP machine due to cost and potential jaw complications. She has noticed a decrease in nighttime urination since starting the CPAP machine.    She is scheduled for cataract surgery on her left eye on 11/14/2024 and on her right eye on 12/05/2024. She was advised not to take her blood pressure medication on the day of surgery.    She has not yet had her mammogram and sonogram due to a recent gynecologist appointment, where she was reassured that there were no concerning findings. She has a regular mammogram scheduled for next Monday.    She has been experiencing pain in her left shoulder for about a year, which started after a fall that resulted in a broken ankle and knee. The pain is localized to the joint and is exacerbated by lying on her left side or extending her arm. She has not sought treatment for this pain but occasionally takes ibuprofen or Tylenol when the pain is  "severe.    She has also been experiencing pain in her feet and hands, which has recently started to radiate into her wrists. This pain is intermittent and tends to worsen at night and in cold weather. She has not been taking any medication for this pain.    She reports no respiratory symptoms, chest pain, heart palpitations, or skin issues. She has been feeling better since starting a hormone patch and vaginal suppositories and cream. She has been seeing a dentist regularly and reports no changes in bowel movements or urinary issues.    She had a diabetic eye exam last month and a foot exam during her last visit. She was prescribed rosuvastatin by Dr. Rivera but stopped taking it due to muscle weakness. She was switched to another medication but experienced the same side effect.    She had COVID-19 6 weeks ago.    FAMILY HISTORY  Her mother has rheumatoid arthritis and osteoarthritis. She has been on methotrexate for years. Her father passed away at 51.       Objective   Vital Signs:  /88   Pulse 78   Temp 97 °F (36.1 °C) (Infrared)   Resp 14   Ht 160 cm (62.99\")   Wt 99.1 kg (218 lb 8 oz)   SpO2 97%   BMI 38.72 kg/m²   Estimated body mass index is 38.72 kg/m² as calculated from the following:    Height as of this encounter: 160 cm (62.99\").    Weight as of this encounter: 99.1 kg (218 lb 8 oz).               Physical Exam  Vitals and nursing note reviewed.   Constitutional:       General: She is not in acute distress.     Appearance: She is well-developed. She is not ill-appearing.   HENT:      Head: Normocephalic and atraumatic.      Left Ear: External ear normal.      Mouth/Throat:      Pharynx: Uvula midline.   Eyes:      General: No scleral icterus.        Right eye: No discharge.         Left eye: No discharge.      Conjunctiva/sclera: Conjunctivae normal.   Neck:      Thyroid: No thyromegaly.   Cardiovascular:      Rate and Rhythm: Normal rate and regular rhythm.      Heart sounds: Normal heart " sounds.   Pulmonary:      Effort: Pulmonary effort is normal.      Breath sounds: Normal breath sounds.   Abdominal:      General: Bowel sounds are normal.      Palpations: Abdomen is soft.      Tenderness: There is no abdominal tenderness.   Musculoskeletal:         General: No deformity.      Left shoulder: Tenderness present. No deformity, bony tenderness or crepitus. Decreased range of motion.      Cervical back: Neck supple.      Comments: Mildly enlarged joints b/l fingers-no erythema   Lymphadenopathy:      Cervical: No cervical adenopathy.   Skin:     General: Skin is warm and dry.   Neurological:      General: No focal deficit present.      Mental Status: She is alert and oriented to person, place, and time.   Psychiatric:         Mood and Affect: Mood normal.         Behavior: Behavior normal.          Physical Exam       Result Review :            Results                  Assessment and Plan     Diagnoses and all orders for this visit:    1. Type 2 diabetes mellitus without complication, without long-term current use of insulin (Primary)  -     Comprehensive Metabolic Panel  -     Hemoglobin A1c  -     Microalbumin / Creatinine Urine Ratio - Urine, Clean Catch    2. Benign essential hypertension  -     Comprehensive Metabolic Panel  -     Microalbumin / Creatinine Urine Ratio - Urine, Clean Catch    3. Mixed hyperlipidemia  -     Comprehensive Metabolic Panel  -     Lipid Panel With LDL / HDL Ratio    4. Polyarthralgia  -     Comprehensive Metabolic Panel  -     C-reactive Protein  -     Sedimentation Rate  -     Rheumatoid Factor  -     Cyclic Citrul Peptide Antibody, IgG / IgA    5. Chronic left shoulder pain    6. Obesity (BMI 30-39.9)    7. RAMU on CPAP    Other orders  -     Fluzone High-Dose 65+yrs        Assessment & Plan  1. Diabetes Mellitus.  Her blood sugar levels are running slightly higher, around 150-160 mg/dL one hour postprandial, but drop to 106 mg/dL later. She was advised to monitor her  blood sugar levels two hours postprandial, with a goal of 150-160 mg/dL. She will continue her current medication regimen, including metformin.     2. Hypertension.  Her blood pressure at home is generally around 130/88 mmHg, but the diastolic pressure is slightly higher than desired. She was advised to monitor her blood pressure at home twice a month and aim for readings around 130/80 mmHg. Amlodipine will be refilled.    3. Shoulder Pain.  She has been experiencing pain in her left shoulder for about a year, which worsens with movement and is accompanied by weakness. An x-ray of her shoulder has been ordered. She was advised to try over-the-counter pain relief like ibuprofen or Tylenol and consider physical therapy if needed.    4. Suspected Arthritis.  She reports pain in her feet, hands, and wrists, which worsens at night and in cold weather. There is a family history of rheumatoid arthritis and osteoarthritis. Inflammatory markers and rheumatoid tests will be repeated to determine the type of arthritis.    5. Sleep Apnea.  She started using a CPAP machine earlier this month and reports feeling much better and less tired. She will continue using the CPAP machine.    6. Cataracts.  She is scheduled for cataract surgery on her left eye on 11/14/2024 and on her right eye on 12/05/2024. She was advised to follow pre-surgery instructions, including not taking her blood pressure medication on the day of surgery.    7. Hyperlipidemia.  She was previously prescribed rosuvastatin but experienced muscle weakness. She was advised to try taking the medication a few times a week or cutting the pills in half. Her cholesterol levels will be checked, and further recommendations will be made based on the results.    8. Health Maintenance.  She has a mammogram scheduled for next Monday. She will inform the radiologist about her breast pain and the gynecologist's findings. She will receive her influenza vaccine today.               Follow Up     No follow-ups on file.  Patient was given instructions and counseling regarding her condition or for health maintenance advice. Please see specific information pulled into the AVS if appropriate.    Patient or patient representative verbalized consent for the use of Ambient Listening during the visit with  KANWAL Barker for chart documentation. 11/1/2024  18:36 EDT       oral

## 2024-11-01 ENCOUNTER — TELEPHONE (OUTPATIENT)
Dept: FAMILY MEDICINE CLINIC | Facility: CLINIC | Age: 64
End: 2024-11-01

## 2024-11-01 DIAGNOSIS — M25.512 LEFT SHOULDER PAIN, UNSPECIFIED CHRONICITY: Primary | ICD-10-CM

## 2024-11-01 PROBLEM — E66.9 OBESITY (BMI 30-39.9): Status: ACTIVE | Noted: 2024-11-01

## 2024-11-01 PROBLEM — G89.29 CHRONIC LEFT SHOULDER PAIN: Status: ACTIVE | Noted: 2024-11-01

## 2024-11-01 PROBLEM — M25.50 POLYARTHRALGIA: Status: ACTIVE | Noted: 2024-11-01

## 2024-11-01 LAB
ALBUMIN SERPL-MCNC: 4.3 G/DL (ref 3.5–5.2)
ALBUMIN/CREAT UR: 3 MG/G CREAT (ref 0–29)
ALBUMIN/GLOB SERPL: 1.8 G/DL
ALP SERPL-CCNC: 45 U/L (ref 39–117)
ALT SERPL-CCNC: 31 U/L (ref 1–33)
AST SERPL-CCNC: 28 U/L (ref 1–32)
BILIRUB SERPL-MCNC: 0.7 MG/DL (ref 0–1.2)
BUN SERPL-MCNC: 13 MG/DL (ref 8–23)
BUN/CREAT SERPL: 13.7 (ref 7–25)
CALCIUM SERPL-MCNC: 9.3 MG/DL (ref 8.6–10.5)
CCP IGA+IGG SERPL IA-ACNC: 14 UNITS (ref 0–19)
CHLORIDE SERPL-SCNC: 103 MMOL/L (ref 98–107)
CHOLEST SERPL-MCNC: 231 MG/DL (ref 0–200)
CO2 SERPL-SCNC: 25.5 MMOL/L (ref 22–29)
CREAT SERPL-MCNC: 0.95 MG/DL (ref 0.57–1)
CREAT UR-MCNC: 156.3 MG/DL
CRP SERPL-MCNC: 0.64 MG/DL (ref 0–0.5)
EGFRCR SERPLBLD CKD-EPI 2021: 66.2 ML/MIN/1.73
ERYTHROCYTE [SEDIMENTATION RATE] IN BLOOD BY WESTERGREN METHOD: 4 MM/HR (ref 0–30)
GLOBULIN SER CALC-MCNC: 2.4 GM/DL
GLUCOSE SERPL-MCNC: 118 MG/DL (ref 65–99)
HBA1C MFR BLD: 6.7 % (ref 4.8–5.6)
HDLC SERPL-MCNC: 57 MG/DL (ref 40–60)
LDLC SERPL CALC-MCNC: 148 MG/DL (ref 0–100)
LDLC/HDLC SERPL: 2.54 {RATIO}
MICROALBUMIN UR-MCNC: 4.9 UG/ML
POTASSIUM SERPL-SCNC: 5 MMOL/L (ref 3.5–5.2)
PROT SERPL-MCNC: 6.7 G/DL (ref 6–8.5)
RHEUMATOID FACT SERPL-ACNC: 11.9 IU/ML
SODIUM SERPL-SCNC: 140 MMOL/L (ref 136–145)
TRIGL SERPL-MCNC: 146 MG/DL (ref 0–150)
VLDLC SERPL CALC-MCNC: 26 MG/DL (ref 5–40)

## 2024-11-01 NOTE — TELEPHONE ENCOUNTER
Caller: Alisha Connolly    Relationship: Self    Best call back number: [phone]    What is the best time to reach you: ANYTIME     Who are you requesting to speak with (clinical staff, provider,  specific staff member): CLINICAL     What was the call regarding: PATIENT STATES THAT AN ORDER WAS SUPPOSED TO BE PLACED FOR AN XRAY OF HER LEFT SHOULDER FOLLOWING HER MOST RECENT VISIT AND SHE HAS NOT HEARD ANYTHING ABOUT THAT.     PATIENT IS CALLING TO CHECK THE STATUS SO THAT SHE CAN SCHEDULE THIS THROUGH [facility].     PLEASE ADVISE

## 2024-11-06 ENCOUNTER — PATIENT MESSAGE (OUTPATIENT)
Dept: FAMILY MEDICINE CLINIC | Facility: CLINIC | Age: 64
End: 2024-11-06
Payer: MEDICARE

## 2024-11-06 DIAGNOSIS — I10 BENIGN ESSENTIAL HYPERTENSION: ICD-10-CM

## 2024-11-07 DIAGNOSIS — I10 BENIGN ESSENTIAL HYPERTENSION: ICD-10-CM

## 2024-11-07 RX ORDER — OLMESARTAN MEDOXOMIL 40 MG/1
40 TABLET ORAL DAILY
Qty: 90 TABLET | Refills: 1 | Status: SHIPPED | OUTPATIENT
Start: 2024-11-07

## 2024-11-07 NOTE — TELEPHONE ENCOUNTER
Rx Refill Note  Requested Prescriptions     Pending Prescriptions Disp Refills    olmesartan (BENICAR) 40 MG tablet [Pharmacy Med Name: OLMESARTAN MEDOXOMIL 40MG TABLETS] 90 tablet 1     Sig: TAKE 1 TABLET BY MOUTH DAILY      Last office visit with prescribing clinician: 10/29/2024   Last telemedicine visit with prescribing clinician: Visit date not found   Next office visit with prescribing clinician: Visit date not found                         Would you like a call back once the refill request has been completed: [] Yes [] No    If the office needs to give you a call back, can they leave a voicemail: [] Yes [] No    Francisco Sanchez Rep  11/07/24, 16:17 EST

## 2024-11-12 RX ORDER — AMLODIPINE BESYLATE 5 MG/1
5 TABLET ORAL DAILY
Qty: 90 TABLET | Refills: 1 | Status: SHIPPED | OUTPATIENT
Start: 2024-11-12

## 2024-11-13 ENCOUNTER — HOSPITAL ENCOUNTER (OUTPATIENT)
Dept: GENERAL RADIOLOGY | Facility: HOSPITAL | Age: 64
Discharge: HOME | End: 2024-11-13
Admitting: NURSE PRACTITIONER
Payer: MEDICARE

## 2024-11-13 DIAGNOSIS — M25.512 LEFT SHOULDER PAIN, UNSPECIFIED CHRONICITY: ICD-10-CM

## 2024-11-13 PROCEDURE — 73030 X-RAY EXAM OF SHOULDER: CPT

## 2024-12-18 ENCOUNTER — OFFICE VISIT (OUTPATIENT)
Dept: SLEEP MEDICINE | Facility: HOSPITAL | Age: 64
End: 2024-12-18
Payer: MEDICARE

## 2024-12-18 VITALS — WEIGHT: 219 LBS | OXYGEN SATURATION: 95 % | HEIGHT: 63 IN | BODY MASS INDEX: 38.8 KG/M2 | HEART RATE: 90 BPM

## 2024-12-18 DIAGNOSIS — E66.812 CLASS 2 SEVERE OBESITY DUE TO EXCESS CALORIES WITH SERIOUS COMORBIDITY AND BODY MASS INDEX (BMI) OF 38.0 TO 38.9 IN ADULT: ICD-10-CM

## 2024-12-18 DIAGNOSIS — E66.01 CLASS 2 SEVERE OBESITY DUE TO EXCESS CALORIES WITH SERIOUS COMORBIDITY AND BODY MASS INDEX (BMI) OF 38.0 TO 38.9 IN ADULT: ICD-10-CM

## 2024-12-18 DIAGNOSIS — G47.33 OSA ON CPAP: Primary | ICD-10-CM

## 2024-12-18 PROCEDURE — 1159F MED LIST DOCD IN RCRD: CPT | Performed by: INTERNAL MEDICINE

## 2024-12-18 PROCEDURE — 99213 OFFICE O/P EST LOW 20 MIN: CPT | Performed by: INTERNAL MEDICINE

## 2024-12-18 PROCEDURE — G0463 HOSPITAL OUTPT CLINIC VISIT: HCPCS

## 2024-12-18 PROCEDURE — 1160F RVW MEDS BY RX/DR IN RCRD: CPT | Performed by: INTERNAL MEDICINE

## 2024-12-18 NOTE — PROGRESS NOTES
"Follow Up Sleep Disorders Center Note     Chief Complaint:  RAMU     Primary Care Physician: Aarti Ogden APRN    Interval History:   The patient is a 64 y.o. female who I last saw 3/13/2024 and that note was reviewed.  Based on the patient's home sleep study, an oral appliance evaluation initiated.  In August, I received communication that the patient elected not to proceed with oral appliance.  Therefore, auto CPAP initiated.  Patient is here today for follow-up.  She states she is improved.  Over the last 2 weeks, she is awakening and hopping up out of bed better.  She goes to bed between 11:30 PM and 12:30 AM and gets out of bed between 7:30 AM and 8 AM.  She will use the restroom during that time.    Review of Systems:    A complete review of systems was done and all were negative with the exception of the above    Social History:    Social History     Socioeconomic History    Marital status:    Tobacco Use    Smoking status: Former     Current packs/day: 0.00     Average packs/day: 0.3 packs/day for 4.5 years (1.1 ttl pk-yrs)     Types: Cigarettes     Start date: 9/6/2009     Quit date: 3/7/2014     Years since quitting: 10.7     Passive exposure: Past    Smokeless tobacco: Never   Vaping Use    Vaping status: Never Used   Substance and Sexual Activity    Alcohol use: Not Currently     Comment: 1/week    Drug use: Never    Sexual activity: Defer     Partners: Male     Birth control/protection: Post-menopausal, Hysterectomy       Allergies:  Penicillins; Atorvastatin; Rosuvastatin; Latex; and Tetanus toxoid, adsorbed     Medication Review:  Reviewed.      Vital Signs:    Vitals:    12/18/24 1120   Pulse: 90   SpO2: 95%   Weight: 99.3 kg (219 lb)   Height: 160 cm (63\")     Body mass index is 38.79 kg/m².    Physical Exam:    Constitutional:  Well developed 64 y.o. female that appears in no apparent distress.  Awake & oriented times 3.  Normal mood with normal recent and remote memory and normal " judgement.  Eyes:  Conjunctivae normal.  Oropharynx: Previously, moist mucous membranes without exudate and a large tongue that is scalloped and class III Mallampati airway.    Self-administered [place] Sleepiness Scale test results: 4, previously 3  0-5 Lower normal daytime sleepiness  6-10 Higher normal daytime sleepiness  11-12 Mild, 13-15 Moderate, & 16-24 Severe excessive daytime sleepiness     Downloaded PAP Data Evaluated For Therapeutic Response and Compliance:  DME is [facility] and the patient uses a nasal mask.  Downloads between 10/10 and 12/16/2024 compliance 99%.  Average usage is 7 hours and 16 minutes.  Average AHI is normal without leak.  Average auto CPAP pressure is 10.7 and her ResMed auto CPAP is 6-12    I have reviewed the above results and compared them with the patient's last downloads and reviewed with the patient.    Impression:   Mild obstructive sleep apnea by home sleep study 3/4/2024, weight 207 pounds, adequately treated with ResMed auto CPAP.  No sleep-related hypoxia noted.  Downloads demonstrate the patient to be at goal with good compliance and usage.  The patient has no complaints of hypersomnolence.     Plan:  Good sleep hygiene measures should be maintained.  Weight loss would be beneficial in this patient who has class II severe obesity by Body mass index is 38.79 kg/m².      After evaluating the patient and assessing results available, the patient is benefiting from the treatment being provided.     The patient will continue ResMed auto CPAP.  Potential side effects of not using PAP therapy reviewed and addressed as needed.  After clinical evaluation and review of downloads, I recommend no changes to the patient's pressures.  A new prescription will be sent to the patient's DME.    I answered all of the patient's questions.  The patient will call the Sleep Disorder Center for any problems and will follow up in 6 months.      Alvaro Webster MD  Sleep Medicine  12/18/24  11:37  EST

## 2024-12-19 PROBLEM — E66.9 OBESITY (BMI 30-39.9): Status: RESOLVED | Noted: 2024-11-01 | Resolved: 2024-12-19

## 2024-12-30 NOTE — PROGRESS NOTES
Date of Office Visit: 2024  Encounter Provider: KANWAL Palma  Place of Service: [facility] CARDIOLOGY  Patient Name: Alisha Connolly  :1960    Chief complaint  palpitations     History of Present Illness  Patient is a 64 y.o. year old female  patient of Dr. Beaver. Past medical history includes asthma, diabetes, obstructive sleep apnea (untreated) hyperlipidemia with history of heart murmur.   Her mother had arrhythmia and a pacemaker father had myocardial infarction at age 51 and  at age 51 with sudden death.   She recounts that several weeks prior to her initial visit blood pressure was elevated and lisinopril was switched to amlodipine and Benicar.  However on 2023 she went to an outlet mall and forgot to take antihypertensives.  On 2023 she was seen at the gynecologist and blood pressure was noted to be slightly elevated.  She was also started on estradiol.  2023 she was seen in the emergency room for hypertension despite use of usual antihypertensive agents.  She had associated with headache and palpitations.  She was given clonidine to use as needed with follow-up with PCP.  However she was seen the next day at [facility] and was told to go back to the emergency room due to hypertension.  Blood pressure was 177/82 pulse was 85 bpm.  High-sensitivity troponin was 16/18 and glucose slightly elevated.  Otherwise labs unremarkable.  Chest x-ray suggested cardiomegaly otherwise unremarkable.  She is now referred here for further evaluation.  Echocardiogram 2023 showed LVEF 61.5%, grade 1 diastolic dysfunction, trace tricuspid regurgitation with normal right-sided pressures.  Perfusion stress test was negative for ischemia and considered low risk.     Interval history  Patient presents today for routine follow-up.  I will visit with her today and have reviewed her medical record.  Since last visit she is wearing CPAP consistently.  She denies  palpitations, shortness of breath, edema, dizziness, chest pain or chest pressure, syncope or presyncope.  She had cataract surgery earlier this year with no issue.  She is very active at home and denies exertional symptoms.  Blood pressure is well-controlled and she reports readings are stable at home and other providers.  She does have as needed clonidine that she has not needed recently.  PCP recommended that she take rosuvastatin once or twice per week.  She has been using a prescription that is over a-year-old.    Past Medical History:   Diagnosis Date    Abnormal Pap smear of cervix     Age-related nuclear cataract of both eyes 02/03/2020    Anal fissure 10/2021    Anal skin tag 08/2022    S/P EXCISION    Ankle fracture 12/2022    RIGHT    Arthrodesis status 03/08/2014    Asthma     Benign essential hypertension 06/05/2015    Continue lisinopril    Carpal tunnel syndrome     PING    Cervical spondylolysis     Chronic neck pain 07/19/2016    Closed nondisplaced fracture of lateral malleolus of right fibula 12/10/2022    NO SURGERY, SEEN AT [facility]    Cystitis 11/2017    Dry eye syndrome 02/03/2020    Environmental allergies     Exogenous hypertriglyceridemia 06/05/2017    Ganglion cyst of finger 01/2020    Hemorrhoids     History of COVID-19 05/2022    History of torn meniscus of left knee     Hormone replacement therapy (HRT)     Hyperchylomicronemia     Hyperlipidemia 06/05/2015    ASCVD 10-year risk ~10% Plan: · Start statin. Unclear whether arm numbness really 2/2 statin in the past.    Hyperopia of both eyes 02/03/2020    Keratoconjunctivitis sicca 02/03/2020    Knee pain, left     Lichen sclerosus     Lung nodule 06/01/2006    Obstructive sleep apnea syndrome 11/01/2004    Not using CPAP due to eye issues    RAMU (obstructive sleep apnea) 03/04/2024    Home sleep study.  Weight 207 pounds.  Mild RAMU with AHI 8.2 events per hour.  No sleep-related hypoxia.  The patient snored 55% of total monitoring time.     Palpitations     Pertussis 2016    [facility] ER    PNA (pneumonia)     PONV (postoperative nausea and vomiting)     Presbyopia 2020    Rotator cuff injury 2011    SEEN AT [facility] ER    Seasonal allergies     Slow to wake up after anesthesia     Snoring     Type 2 diabetes mellitus without complication 2015    Under good control Plan: · Reassured about use of metformin    Urinary urgency 2021     Past Surgical History:   Procedure Laterality Date    ANTERIOR CERVICAL DISCECTOMY W/ FUSION N/A 2014    C6-7, DR. ALEXEY IZAGUIRRE AT [facility]. TITANIUM PLATE    ANUS SURGERY N/A 2022    Procedure: ANAL TAG EXCISION;  Surgeon: Bradley Santiago MD;  Location:  [facility];  Service: General;  Laterality: N/A;  pre-anal tag  post-same    CARPAL TUNNEL RELEASE Bilateral      SECTION          COLONOSCOPY N/A     COLONOSCOPY N/A 2022    EXTERNAL AND INTERNAL HEMORRHOIDS, GRADE 3-BANDED, RESCOPE IN 10 YRS, DR. BRADLEY SANTIAGO AT [facility]    HEMORRHOIDECTOMY N/A 2022    Procedure: HEMORRHOID BANDING x3;  Surgeon: Bradley Santiago MD;  Location:  [facility];  Service: General;  Laterality: N/A;  pre-hemorrhoids  post-same    HYSTERECTOMY N/A     WITH BSO, FOR HEAVY BLEEDING    INCISIONAL BREAST BIOPSY Right     BENIGN    LAPAROSCOPIC LYSIS OF ADHESIONS N/A     OOPHORECTOMY      OOPHORECTOMY      SPHINCTEROTOMY N/A 2023    Procedure: EXAM UNDER ANESTHESIA, CHEMICAL SPHINCTEROTOMY WITH BOTOX;  Surgeon: Bradley Santiago MD;  [facility];  Service: General;  Laterality: N/A;    SPHINCTEROTOMY N/A 2023    Procedure: chemical sphincterotomy with Botox and hemorrhoidectomy;  Surgeon: Bradley Santiago MD;  Location:  [facility];  Service: General;  Laterality: N/A;    TOTAL KNEE ARTHROPLASTY Left 2023    Procedure: LEFT TOTAL KNEE ARTHROPLASTY WITH JACE NAVIGATION;  Surgeon: Tomás Rasmussen MD;  Location:  [facility];  Service:  Orthopedics;  Laterality: Left;     Outpatient Medications Prior to Visit   Medication Sig Dispense Refill    acetaminophen (TYLENOL) 500 MG tablet Take 1 tablet by mouth Every 6 (Six) Hours As Needed for Mild Pain. Indications: Pain      albuterol sulfate  (90 Base) MCG/ACT inhaler Inhale 2 puffs Every 4 (Four) Hours As Needed. Indications: Asthma (Patient taking differently: Inhale 2 puffs Every 4 (Four) Hours As Needed for Shortness of Air. Indications: Asthma)      amLODIPine (NORVASC) 5 MG tablet Take 1 tablet by mouth Daily. (Patient taking differently: Take 1 tablet by mouth Every Night.) 90 tablet 1    cloNIDine (CATAPRES) 0.1 MG tablet Take 1 tablet by mouth 2 (Two) Times a Day As Needed for High Blood Pressure (Systolic BP > 180, Diastolic BP > 110). (Patient taking differently: Take 1 tablet by mouth 2 (Two) Times a Day As Needed for High Blood Pressure (SBP > 180, DBP > 110). ONLY TAKES IF BP GOES UP  Indications: High Blood Pressure) 15 tablet 0    estradiol (MINIVELLE, VIVELLE-DOT) 0.05 MG/24HR patch 1 patch 2 (Two) Times a Week.      estradiol (VAGIFEM) 10 MCG tablet vaginal tablet INSERT 1 TABLET INTRAVAGINALLY 2 TIMES A WEEK      Lancets (OneTouch Delica Plus [id number]) misc 1 each by Other route Daily. 100 each 6    metFORMIN (GLUCOPHAGE) 500 MG tablet TAKE 1 TABLET BY MOUTH TWICE DAILY WITH MEALS 180 tablet 1    olmesartan (BENICAR) 40 MG tablet TAKE 1 TABLET BY MOUTH DAILY (Patient taking differently: Take 1 tablet by mouth Every Morning.) 90 tablet 1    OneTouch Ultra test strip 1 each by Other route Daily. 400 each 1    prednisoLONE acetate (PRED FORTE) 1 % ophthalmic suspension SHAKE LIQUID AND INSTILL 1 DROP IN LEFT EYE FOUR TIMES DAILY. START DROP 3 DAYS BEFORE SURGERY AND. CONTINUE ACCORDING TO POST-OP INSTRUCTIONS      ketorolac (ACULAR) 0.5 % ophthalmic solution INSTILL 1 DROP IN LEFT EYE FOUR TIMES DAILY. START DROP 3 DAYS BEFORE SURGERY AND. CONTINUE ACCORDING TO POST-OP  INSTRUCTIONS      methylPREDNISolone (MEDROL) 4 MG dose pack Take as directed on package instructions. (Patient not taking: Reported on 12/31/2024) 21 each 0    polyethylene glycol (MIRALAX) 17 g packet Take 17 g by mouth 2 (Two) Times a Day. (Patient taking differently: Take 17 g by mouth As Needed.)       No facility-administered medications prior to visit.       Allergies as of 12/31/2024 - Reviewed 12/31/2024   Allergen Reaction Noted    Penicillins Hives 12/10/1996    Atorvastatin Paresthesia 02/26/2018    Rosuvastatin Paresthesia 05/06/2005    Latex Rash 08/31/2021    Tetanus toxoid, adsorbed Rash 12/10/1996     Social History     Socioeconomic History    Marital status:    Tobacco Use    Smoking status: Former     Current packs/day: 0.00     Average packs/day: 0.3 packs/day for 4.5 years (1.1 ttl pk-yrs)     Types: Cigarettes     Start date: 9/6/2009     Quit date: 3/7/2014     Years since quitting: 10.8     Passive exposure: Past    Smokeless tobacco: Never   Vaping Use    Vaping status: Never Used   Substance and Sexual Activity    Alcohol use: Not Currently     Comment: 1/week    Drug use: Never    Sexual activity: Defer     Partners: Male     Birth control/protection: Post-menopausal, Hysterectomy     Family History   Problem Relation Age of Onset    Arthritis Mother     Heart disease Mother     Arrhythmia Mother     Hypertension Father     Heart attack Father     Hyperlipidemia Father     Diabetes Father     Heart disease Father     Coronary artery disease Father     Diabetes Sister     Diabetes Paternal Grandmother     Cancer Cousin     Breast cancer Cousin     Cancer Maternal Great-Grandmother     Ovarian cancer Maternal Great-Grandmother     Malig Hyperthermia Neg Hx      Review of Systems   Constitutional: Positive for malaise/fatigue.   Cardiovascular:  Negative for chest pain, claudication, dyspnea on exertion, leg swelling, near-syncope, orthopnea, palpitations, paroxysmal nocturnal dyspnea  "and syncope.   Respiratory:  Negative for shortness of breath.    Neurological:  Negative for brief paralysis, dizziness, headaches and light-headedness.   All other systems reviewed and are negative.       Objective:     Vitals:    12/31/24 1104   BP: 124/76   Pulse: 79   Resp: 18   SpO2: 97%   Weight: 101 kg (222 lb)   Height: 160 cm (63\")     Body mass index is 39.33 kg/m².    Vitals reviewed.   Constitutional:       General: Not in acute distress.     Appearance: Well-developed and not in distress. Not diaphoretic.   HENT:      Head: Normocephalic.   Pulmonary:      Effort: Pulmonary effort is normal. No respiratory distress.      Breath sounds: Normal breath sounds. No wheezing. No rhonchi. No rales.   Cardiovascular:      Normal rate. Regular rhythm.      Murmurs: There is no murmur.   Pulses:     Radial: 2+ bilaterally.  Edema:     Peripheral edema absent.   Skin:     General: Skin is warm and dry. There is no cyanosis.      Findings: No rash.   Neurological:      Mental Status: Alert and oriented to person, place, and time.   Psychiatric:         Behavior: Behavior normal. Behavior is cooperative.         Thought Content: Thought content normal.         Judgment: Judgment normal.       Lab Review:     Lab Results   Component Value Date     10/29/2024     06/17/2024    K 5.0 10/29/2024    K 4.8 06/17/2024     10/29/2024     06/17/2024    CO2 25.5 10/29/2024    CO2 24.9 06/17/2024    BUN 13 10/29/2024    BUN 16 06/17/2024    CREATININE 0.95 10/29/2024    CREATININE 0.95 06/17/2024    EGFRIFAFRI >60 03/31/2021    GLUCOSE 118 (H) 10/29/2024    GLUCOSE 115 (H) 06/17/2024    CALCIUM 9.3 10/29/2024    CALCIUM 9.8 06/17/2024    PROTENTOTREF 6.7 10/29/2024    PROTENTOTREF 6.3 05/31/2024    ALBUMIN 4.3 10/29/2024    ALBUMIN 4.4 05/31/2024    BILITOT 0.7 10/29/2024    BILITOT 0.3 05/31/2024    AST 28 10/29/2024    AST 24 05/31/2024    ALT 31 10/29/2024    ALT 22 05/31/2024     Lab Results " "  Component Value Date    WBC 6.14 11/03/2023    WBC 6.98 06/07/2023    HGB 11.9 (L) 11/03/2023    HGB 11.2 (L) 06/21/2023    HCT 35.8 11/03/2023    HCT 33.2 (L) 06/21/2023    MCV 89.1 11/03/2023    MCV 90.1 06/07/2023     11/03/2023     06/07/2023     No results found for: \"PROBNP\", \"BNP\"  Lab Results   Component Value Date    CKTOTAL 96 01/31/2020    TROPONINT 18 (H) 06/07/2023     Lab Results   Component Value Date    TSH 2.100 06/08/2023    TSH 2.150 11/22/2022      Lipid Panel          10/29/2024    11:29   Lipid Panel   Total Cholesterol 231    Triglycerides 146    HDL Cholesterol 57    VLDL Cholesterol 26    LDL Cholesterol  148    LDL/HDL Ratio 2.54           ECG 12 Lead    Date/Time: 12/31/2024 11:20 AM  Performed by: Haylee Herron APRN    Authorized by: Haylee Herron APRN  Comparison: compared with previous ECG   Similar to previous ECG  Rhythm: sinus rhythm  Ectopy: atrial premature contractions  Rate: normal  BPM: 79  QRS axis: normal  Other findings: low voltage  Comments: Similar to prior        Assessment:       Diagnosis Plan   1. Abnormal electrocardiogram (ECG) (EKG)        2. Essential hypertension        3. RAMU (obstructive sleep apnea)        4. Hyperlipidemia LDL goal <70          Plan:       1.  Borderline elevated troponin.  Likely due to hypertension or she may have chronically elevated troponins.  Lexiscan Cardiolite stress test was negative for ischemia.  She denies any anginal symptoms and is quite active.  2.  Hypertension as above.  Well-controlled on current regimen.  She has had some \"puffiness\" but has been eating out more recently.  We discussed following a low-salt diet to avoid edema.  She would like to avoid additional medication if at all possible.  She also complains of a pins and needle sensation in her feet I advised compression stockings and if no relief from this could consider vascular referral.  3.  Hyperlipidemia.  She has been intolerant of " statins previously.  This is managed by PCP.  She has recently started taking rosuvastatin twice per week.  Also advised her to use coq.10 with this to help prevent myalgias.  I sent in a new prescription as her current prescription was written in  and medication was likely .  I asked her to get further refills of this from PCP.  4.  Obstructive sleep apnea.  Now wearing CPAP and following with sleep medicine.      Time Spent: I spent 40 minutes caring for Alisha on this date of service. This time includes time spent by me in the following activities: preparing for the visit, reviewing tests, performing a medically appropriate examination and/or evaluations, counseling and educating the patient/family/caregiver, ordering medications, tests, or procedures, documenting information in the medical record, and independently interpreting results and communicating that information with the patient/family/caregiver.   I spent 1 minutes on the separately reported service of ECG. This time is not included in the time used to support the E/M service also reported today.        Your medication list            Accurate as of 2024 11:22 AM. If you have any questions, ask your nurse or doctor.                CHANGE how you take these medications        Instructions Last Dose Given Next Dose Due   albuterol sulfate  (90 Base) MCG/ACT inhaler  Commonly known as: PROVENTIL HFA;VENTOLIN HFA;PROAIR HFA  What changed: reasons to take this      Inhale 2 puffs Every 4 (Four) Hours As Needed. Indications: Asthma       amLODIPine 5 MG tablet  Commonly known as: NORVASC  What changed: when to take this      Take 1 tablet by mouth Daily.       cloNIDine 0.1 MG tablet  Commonly known as: CATAPRES  What changed: additional instructions      Take 1 tablet by mouth 2 (Two) Times a Day As Needed for High Blood Pressure (Systolic BP > 180, Diastolic BP > 110).       olmesartan 40 MG tablet  Commonly known as:  RASHMI  What changed: when to take this      TAKE 1 TABLET BY MOUTH DAILY              CONTINUE taking these medications        Instructions Last Dose Given Next Dose Due   acetaminophen 500 MG tablet  Commonly known as: TYLENOL      Take 1 tablet by mouth Every 6 (Six) Hours As Needed for Mild Pain. Indications: Pain       estradiol 0.05 MG/24HR patch  Commonly known as: MINIVELLE, VIVELLE-DOT      1 patch 2 (Two) Times a Week.       estradiol 10 MCG tablet vaginal tablet  Commonly known as: VAGIFEM      INSERT 1 TABLET INTRAVAGINALLY 2 TIMES A WEEK       ketorolac 0.5 % ophthalmic solution  Commonly known as: ACULAR      INSTILL 1 DROP IN LEFT EYE FOUR TIMES DAILY. START DROP 3 DAYS BEFORE SURGERY AND. CONTINUE ACCORDING TO POST-OP INSTRUCTIONS       metFORMIN 500 MG tablet  Commonly known as: GLUCOPHAGE      TAKE 1 TABLET BY MOUTH TWICE DAILY WITH MEALS       OneTouch Delica Plus [id number] misc      1 each by Other route Daily.       OneTouch Ultra test strip  Generic drug: glucose blood      1 each by Other route Daily.       prednisoLONE acetate 1 % ophthalmic suspension  Commonly known as: PRED FORTE      SHAKE LIQUID AND INSTILL 1 DROP IN LEFT EYE FOUR TIMES DAILY. START DROP 3 DAYS BEFORE SURGERY AND. CONTINUE ACCORDING TO POST-OP INSTRUCTIONS                Patient is no longer taking -.  I corrected the med list to reflect this.  I did not stop these medications.    No follow-ups on file.      Dictated utilizing Dragon dictation

## 2024-12-31 ENCOUNTER — OFFICE VISIT (OUTPATIENT)
Dept: CARDIOLOGY | Facility: CLINIC | Age: 64
End: 2024-12-31
Payer: MEDICARE

## 2024-12-31 VITALS
HEIGHT: 63 IN | OXYGEN SATURATION: 97 % | WEIGHT: 222 LBS | DIASTOLIC BLOOD PRESSURE: 76 MMHG | RESPIRATION RATE: 18 BRPM | BODY MASS INDEX: 39.34 KG/M2 | HEART RATE: 79 BPM | SYSTOLIC BLOOD PRESSURE: 124 MMHG

## 2024-12-31 DIAGNOSIS — G47.33 OSA (OBSTRUCTIVE SLEEP APNEA): ICD-10-CM

## 2024-12-31 DIAGNOSIS — I10 ESSENTIAL HYPERTENSION: ICD-10-CM

## 2024-12-31 DIAGNOSIS — E78.5 HYPERLIPIDEMIA LDL GOAL <70: ICD-10-CM

## 2024-12-31 DIAGNOSIS — R94.31 ABNORMAL ELECTROCARDIOGRAM (ECG) (EKG): Primary | ICD-10-CM

## 2024-12-31 RX ORDER — ROSUVASTATIN CALCIUM 5 MG/1
5 TABLET, COATED ORAL DAILY
Qty: 30 TABLET | Refills: 0 | Status: SHIPPED | OUTPATIENT
Start: 2024-12-31 | End: 2025-01-02

## 2025-01-02 RX ORDER — ROSUVASTATIN CALCIUM 5 MG/1
5 TABLET, COATED ORAL DAILY
Qty: 90 TABLET | Refills: 0 | Status: SHIPPED | OUTPATIENT
Start: 2025-01-02

## 2025-01-24 DIAGNOSIS — E11.9 TYPE 2 DIABETES MELLITUS WITHOUT COMPLICATION, WITHOUT LONG-TERM CURRENT USE OF INSULIN: Chronic | ICD-10-CM

## 2025-01-24 NOTE — TELEPHONE ENCOUNTER
Rx Refill Note  Requested Prescriptions     Pending Prescriptions Disp Refills    metFORMIN (GLUCOPHAGE) 500 MG tablet [Pharmacy Med Name: METFORMIN 500MG TABLETS] 180 tablet 1     Sig: TAKE 1 TABLET BY MOUTH TWICE DAILY WITH MEALS      Last office visit with prescribing clinician: 10/29/2024   Last telemedicine visit with prescribing clinician: Visit date not found   Next office visit with prescribing clinician: Visit date not found                         Would you like a call back once the refill request has been completed: [] Yes [] No    If the office needs to give you a call back, can they leave a voicemail: [] Yes [] No    Francisco Sanchez Rep  01/24/25, 10:33 EST

## 2025-01-24 NOTE — TELEPHONE ENCOUNTER
Please contact patient, medication was refilled, but needs an appointment in April for 6 moth follow up or physical whichever is needed-SW

## 2025-02-15 DIAGNOSIS — I10 BENIGN ESSENTIAL HYPERTENSION: ICD-10-CM

## 2025-02-17 RX ORDER — AMLODIPINE BESYLATE 5 MG/1
5 TABLET ORAL DAILY
Qty: 90 TABLET | Refills: 1 | Status: SHIPPED | OUTPATIENT
Start: 2025-02-17

## 2025-02-17 NOTE — TELEPHONE ENCOUNTER
Rx Refill Note  Requested Prescriptions     Pending Prescriptions Disp Refills    amLODIPine (NORVASC) 5 MG tablet [Pharmacy Med Name: AMLODIPINE BESYLATE 5MG TABLETS] 90 tablet 1     Sig: TAKE 1 TABLET BY MOUTH DAILY      Last office visit with prescribing clinician: 10/29/2024   Last telemedicine visit with prescribing clinician: Visit date not found   Next office visit with prescribing clinician: 4/14/2025                         Would you like a call back once the refill request has been completed: [] Yes [] No    If the office needs to give you a call back, can they leave a voicemail: [] Yes [] No    Francisco Sanchez Rep  02/17/25, 09:26 EST

## 2025-03-21 ENCOUNTER — TELEPHONE (OUTPATIENT)
Dept: FAMILY MEDICINE CLINIC | Facility: CLINIC | Age: 65
End: 2025-03-21

## 2025-03-21 NOTE — TELEPHONE ENCOUNTER
Caller: Alisha Connolly    Relationship: Self    Best call back number: [phone]     What is the medical concern/diagnosis: DIABETES     What specialty or service is being requested: ENDOCRINOLOGIST    What is the provider, practice or medical service name: [facility]     What is the office location: [address] [place] [address] ·    What is the office phone number: [phone]    FAX NUMBER: [phone]    Any additional details:

## 2025-03-23 ENCOUNTER — APPOINTMENT (OUTPATIENT)
Dept: GENERAL RADIOLOGY | Facility: HOSPITAL | Age: 65
End: 2025-03-23
Payer: MEDICARE

## 2025-03-23 ENCOUNTER — HOSPITAL ENCOUNTER (EMERGENCY)
Facility: HOSPITAL | Age: 65
Discharge: HOME | End: 2025-03-23
Attending: EMERGENCY MEDICINE | Admitting: EMERGENCY MEDICINE
Payer: MEDICARE

## 2025-03-23 VITALS
DIASTOLIC BLOOD PRESSURE: 71 MMHG | OXYGEN SATURATION: 98 % | SYSTOLIC BLOOD PRESSURE: 145 MMHG | TEMPERATURE: 96.9 F | RESPIRATION RATE: 16 BRPM | HEART RATE: 74 BPM

## 2025-03-23 DIAGNOSIS — S83.91XA SPRAIN OF RIGHT KNEE, UNSPECIFIED LIGAMENT, INITIAL ENCOUNTER: Primary | ICD-10-CM

## 2025-03-23 PROCEDURE — 73560 X-RAY EXAM OF KNEE 1 OR 2: CPT

## 2025-03-23 PROCEDURE — 99283 EMERGENCY DEPT VISIT LOW MDM: CPT

## 2025-03-23 RX ORDER — HYDROCODONE BITARTRATE AND ACETAMINOPHEN 5; 325 MG/1; MG/1
1 TABLET ORAL EVERY 6 HOURS PRN
Qty: 12 TABLET | Refills: 0 | Status: SHIPPED | OUTPATIENT
Start: 2025-03-23 | End: 2025-03-26

## 2025-03-23 RX ORDER — HYDROCODONE BITARTRATE AND ACETAMINOPHEN 5; 325 MG/1; MG/1
1 TABLET ORAL ONCE
Refills: 0 | Status: COMPLETED | OUTPATIENT
Start: 2025-03-23 | End: 2025-03-23

## 2025-03-23 RX ADMIN — HYDROCODONE BITARTRATE AND ACETAMINOPHEN 1 TABLET: 5; 325 TABLET ORAL at 15:43

## 2025-03-23 NOTE — ED TRIAGE NOTES
Pt to ED via PV with R knee pain, states she was out dancing last night, heard her knee pop and today it is swollen and pt is unable to ambulate.

## 2025-03-23 NOTE — ED PROVIDER NOTES
EMERGENCY DEPARTMENT ENCOUNTER    Room Number:  09/09  PCP: Aarti Ogden APRN    HPI:  Chief Complaint: Right knee pain  A complete HPI/ROS/PMH/PSH/SH/FH are unobtainable due to: None  Context: Alisha Connolly is a 65 y.o. female who presents to the ED c/o acute right knee pain.  She states that she has been having pain in her right knee for 2 years.  She has been told that she will eventually need knee replacement of her right knee.  She states that yesterday she was dancing when she felt a sudden pop in her right knee and has had difficulty ambulating since then.        PAST MEDICAL HISTORY  Active Ambulatory Problems     Diagnosis Date Noted    Age-related nuclear cataract of both eyes 02/03/2020    Arthrodesis status 03/08/2014    Benign essential hypertension 06/05/2015    Chronic neck pain 07/19/2016    Dry eye syndrome 02/03/2020    Exogenous hypertriglyceridemia 06/05/2017    S/P hysterectomy with oophorectomy 11/22/2011    Hyperlipidemia 06/05/2015    Hyperopia of both eyes 02/03/2020    Hypertension 11/22/2011    Keratoconjunctivitis sicca 02/03/2020    Lung nodule 06/01/2006    RAMU (obstructive sleep apnea) 11/01/2004    History of solitary pulmonary nodule 06/01/2006    Presbyopia 02/03/2020    Type 2 diabetes mellitus without complication 12/21/2015    Anal skin tag 08/18/2022    Encounter for screening colonoscopy 08/18/2022    Hemorrhoids 08/18/2022    Closed nondisplaced fracture of lateral malleolus of right fibula 12/14/2022    Anal fissure 01/16/2023    Arthritis of left knee 04/26/2023    Internal hemorrhoids with complication 09/25/2023    External hemorrhoids with complication 09/25/2023    Class 2 severe obesity due to excess calories with serious comorbidity and body mass index (BMI) of 38.0 to 38.9 in adult 02/25/2024    RAMU on CPAP 03/04/2024    Polyarthralgia 11/01/2024    Chronic left shoulder pain 11/01/2024     Resolved Ambulatory Problems     Diagnosis Date Noted    Obesity (BMI  30-39.9) 2024     Past Medical History:   Diagnosis Date    Abnormal Pap smear of cervix     Ankle fracture 2022    Asthma     Carpal tunnel syndrome     Cervical spondylolysis     Cystitis 2017    Environmental allergies     Ganglion cyst of finger 2020    History of COVID-19 2022    History of torn meniscus of left knee     Hormone replacement therapy (HRT)     Hyperchylomicronemia     Knee pain, left     Lichen sclerosus     Obstructive sleep apnea syndrome 2004    Palpitations     Pertussis 2016    PNA (pneumonia)     PONV (postoperative nausea and vomiting)     Rotator cuff injury 2011    Seasonal allergies     Slow to wake up after anesthesia     Snoring     Urinary urgency 2021         PAST SURGICAL HISTORY  Past Surgical History:   Procedure Laterality Date    ANTERIOR CERVICAL DISCECTOMY W/ FUSION N/A 2014    C6-7, DR. ALEXEY IZAGUIRRE AT [facility]. TITANIUM PLATE    ANUS SURGERY N/A 2022    Procedure: ANAL TAG EXCISION;  Surgeon: Bradley Santiago MD;  Location:  [facility];  Service: General;  Laterality: N/A;  pre-anal tag  post-same    CARPAL TUNNEL RELEASE Bilateral      SECTION          COLONOSCOPY N/A     COLONOSCOPY N/A 2022    EXTERNAL AND INTERNAL HEMORRHOIDS, GRADE 3-BANDED, RESCOPE IN 10 YRS, DR. BRADLEY SANTIAGO AT [facility]    HEMORRHOIDECTOMY N/A 2022    Procedure: HEMORRHOID BANDING x3;  Surgeon: Bradley Santiago MD;  Location: [facility];  Service: General;  Laterality: N/A;  pre-hemorrhoids  post-same    HYSTERECTOMY N/A     WITH BSO, FOR HEAVY BLEEDING    INCISIONAL BREAST BIOPSY Right     BENIGN    LAPAROSCOPIC LYSIS OF ADHESIONS N/A     OOPHORECTOMY      OOPHORECTOMY      SPHINCTEROTOMY N/A 2023    Procedure: EXAM UNDER ANESTHESIA, CHEMICAL SPHINCTEROTOMY WITH BOTOX;  Surgeon: Bradley Santiago MD;  Location: [facility] MAIN OR;  Service: General;  Laterality: N/A;    SPHINCTEROTOMY N/A 2023     Procedure: chemical sphincterotomy with Botox and hemorrhoidectomy;  Surgeon: Sarai Perez MD;  Location: [facility] MAIN OR;  Service: General;  Laterality: N/A;    TOTAL KNEE ARTHROPLASTY Left 2023    Procedure: LEFT TOTAL KNEE ARTHROPLASTY WITH JACE NAVIGATION;  Surgeon: Tomás Rasmussen MD;  Location: [facility] OR [facility];  Service: Orthopedics;  Laterality: Left;         FAMILY HISTORY  Family History   Problem Relation Age of Onset    Arthritis Mother     Heart disease Mother     Arrhythmia Mother     Hypertension Father     Heart attack Father     Hyperlipidemia Father     Diabetes Father     Heart disease Father     Coronary artery disease Father     Diabetes Sister     Diabetes Paternal Grandmother     Cancer Cousin     Breast cancer Cousin     Cancer Maternal Great-Grandmother     Ovarian cancer Maternal Great-Grandmother     Malig Hyperthermia Neg Hx          SOCIAL HISTORY  Social History     Socioeconomic History    Marital status:    Tobacco Use    Smoking status: Former     Current packs/day: 0.00     Average packs/day: 0.3 packs/day for 4.5 years (1.1 ttl pk-yrs)     Types: Cigarettes     Start date: 2009     Quit date: 3/7/2014     Years since quittin.0     Passive exposure: Past    Smokeless tobacco: Never   Vaping Use    Vaping status: Never Used   Substance and Sexual Activity    Alcohol use: Not Currently     Comment: 1/week    Drug use: Never    Sexual activity: Defer     Partners: Male     Birth control/protection: Post-menopausal, Hysterectomy         ALLERGIES  Penicillins; Atorvastatin; Rosuvastatin; Latex; and Tetanus toxoid, adsorbed        REVIEW OF SYSTEMS  Review of Systems     Included in HPI  All systems reviewed and negative except for those discussed in HPI.       PHYSICAL EXAM  ED Triage Vitals   Temp Heart Rate Resp BP SpO2   25 1428 25 1428 25 1428 25 1430 25 1428   96.9 °F (36.1 °C) 74 16 145/71 98 %      Temp src Heart Rate  Source Patient Position BP Location FiO2 (%)   -- -- -- -- --              Physical Exam      GENERAL: no acute distress  HENT: nares patent  EYES: no scleral icterus  CV: regular rhythm, normal rate, 2+ right DP pulse  RESPIRATORY: normal effort  MUSCULOSKELETAL: no deformity, intact right knee extension, stable right knee ligaments, she reports having diffuse tenderness to the right knee  NEURO: alert, moves all extremities, follows commands  PSYCH:  calm, cooperative  SKIN: warm, dry, no overlying redness/warmth to the right knee    Vital signs and nursing notes reviewed.          LAB RESULTS  No results found for this or any previous visit (from the past 24 hours).    Ordered the above labs and reviewed the results.        RADIOLOGY  XR Knee 1 or 2 View Right  Result Date: 3/23/2025  RIGHT KNEE, 2 VIEWS  HISTORY: Right knee injury and pain  COMPARISON: None available  FINDINGS: There is no fracture or dislocation. There is mild tricompartmental degenerative change. There is minimal knee joint effusion      As above    This report was finalized on 3/23/2025 3:54 PM by Dr. Killian Ann M.D on Workstation: [id number]        Ordered the above noted radiological studies. Reviewed by me in PACS.        MEDICATIONS GIVEN IN ER  Medications   HYDROcodone-acetaminophen (NORCO) 5-325 MG per tablet 1 tablet (1 tablet Oral Given 3/23/25 1083)         ORDERS PLACED DURING THIS VISIT:  Orders Placed This Encounter   Procedures    XR Knee 1 or 2 View Right    Ambulate Patient - Report tolerance to provider         OUTPATIENT MEDICATION MANAGEMENT:  No current Epic-ordered facility-administered medications on file.     Current Outpatient Medications Ordered in Epic   Medication Sig Dispense Refill    acetaminophen (TYLENOL) 500 MG tablet Take 1 tablet by mouth Every 6 (Six) Hours As Needed for Mild Pain. Indications: Pain      albuterol sulfate  (90 Base) MCG/ACT inhaler Inhale 2 puffs Every 4 (Four) Hours As  Needed. Indications: Asthma (Patient taking differently: Inhale 2 puffs Every 4 (Four) Hours As Needed for Shortness of Air. Indications: Asthma)      amLODIPine (NORVASC) 5 MG tablet TAKE 1 TABLET BY MOUTH DAILY 90 tablet 1    cloNIDine (CATAPRES) 0.1 MG tablet Take 1 tablet by mouth 2 (Two) Times a Day As Needed for High Blood Pressure (Systolic BP > 180, Diastolic BP > 110). (Patient taking differently: Take 1 tablet by mouth 2 (Two) Times a Day As Needed for High Blood Pressure (SBP > 180, DBP > 110). ONLY TAKES IF BP GOES UP  Indications: High Blood Pressure) 15 tablet 0    estradiol (MINIVELLE, VIVELLE-DOT) 0.05 MG/24HR patch 1 patch 2 (Two) Times a Week.      estradiol (VAGIFEM) 10 MCG tablet vaginal tablet INSERT 1 TABLET INTRAVAGINALLY 2 TIMES A WEEK      HYDROcodone-acetaminophen (NORCO) 5-325 MG per tablet Take 1 tablet by mouth Every 6 (Six) Hours As Needed for Severe Pain for up to 3 days. 12 tablet 0    ketorolac (ACULAR) 0.5 % ophthalmic solution INSTILL 1 DROP IN LEFT EYE FOUR TIMES DAILY. START DROP 3 DAYS BEFORE SURGERY AND. CONTINUE ACCORDING TO POST-OP INSTRUCTIONS      Lancets (OneTouch Delica Plus [id number]) misc 1 each by Other route Daily. 100 each 6    metFORMIN (GLUCOPHAGE) 500 MG tablet TAKE 1 TABLET BY MOUTH TWICE DAILY WITH MEALS 180 tablet 0    olmesartan (BENICAR) 40 MG tablet TAKE 1 TABLET BY MOUTH DAILY (Patient taking differently: Take 1 tablet by mouth Every Morning.) 90 tablet 1    OneTouch Ultra test strip 1 each by Other route Daily. 400 each 1    prednisoLONE acetate (PRED FORTE) 1 % ophthalmic suspension SHAKE LIQUID AND INSTILL 1 DROP IN LEFT EYE FOUR TIMES DAILY. START DROP 3 DAYS BEFORE SURGERY AND. CONTINUE ACCORDING TO POST-OP INSTRUCTIONS      rosuvastatin (CRESTOR) 5 MG tablet TAKE 1 TABLET BY MOUTH DAILY 90 tablet 0       PROCEDURES  Procedures          MEDICAL DECISION MAKING, PROGRESS, and CONSULTS    Discussion below represents my analysis of pertinent findings  related to patient's condition, differential diagnosis, treatment plan and final disposition.            Differential diagnosis:    Ligamentous injury, fracture, dislocation.  I low suspicion for septic joint or crystallopathy           Medical chart reviewed:  SETH reviewed by Michael Azevedo II, MD        Independent interpretation of labs, radiology studies, and discussions with consultants:  ED Course as of 03/23/25 1608   Sun Mar 23, 2025   1540 X-ray of the right knee independently interpreted by myself.  No fracture.  No dislocation.Minimal effusion [TD]      ED Course User Index  [TD] Michael Azevedo II, MD       Pain medication sent for home.  She will follow-up with orthopedics.    Clinical Scores:                                   DIAGNOSIS  Final diagnoses:   Sprain of right knee, unspecified ligament, initial encounter         DISPOSITION  DISCHARGE    FOLLOW-UP  [facility]  [address] [place] [address] [id number]  [place] [address]  [phone]  Call in 1 day  to schedule a follow up appointment         Medication List        New Prescriptions      HYDROcodone-acetaminophen 5-325 MG per tablet  Commonly known as: NORCO  Take 1 tablet by mouth Every 6 (Six) Hours As Needed for Severe Pain for up to 3 days.            Changed      albuterol sulfate  (90 Base) MCG/ACT inhaler  Commonly known as: PROVENTIL HFA;VENTOLIN HFA;PROAIR HFA  What changed: reasons to take this     cloNIDine 0.1 MG tablet  Commonly known as: CATAPRES  Take 1 tablet by mouth 2 (Two) Times a Day As Needed for High Blood Pressure (Systolic BP > 180, Diastolic BP > 110).  What changed: additional instructions     olmesartan 40 MG tablet  Commonly known as: BENICAR  TAKE 1 TABLET BY MOUTH DAILY  What changed: when to take this               Where to Get Your Medications        These medications were sent to [facility] - [place] [address]      Hours: Monday to  Friday 7 AM to 6 PM, Saturday & Sunday 8 AM to 4:30 PM (Closed 12 PM to 12:30 PM) Phone: [phone]   HYDROcodone-acetaminophen 5-325 MG per tablet             Latest Documented Vital Signs:  As of 16:08 EDT  BP- 145/71 HR- 74 Temp- 96.9 °F (36.1 °C) O2 sat- 98%      --    Please note that portions of this were completed with a voice recognition program.       Note Disclaimer: At [facility], we believe that sharing information builds trust and better relationships. You are receiving this note because you are receiving care at [facility] or recently visited. It is possible you will see health information before a provider has talked with you about it. This kind of information can be easy to misunderstand. To help you fully understand what it means for your health, we urge you to discuss this note with your provider.         Michael Azevedo II, MD  03/23/25 [id number]       Michael Azevedo II, MD  03/23/25 [id number]

## 2025-03-24 DIAGNOSIS — E11.9 TYPE 2 DIABETES MELLITUS WITHOUT COMPLICATION, WITHOUT LONG-TERM CURRENT USE OF INSULIN: Primary | ICD-10-CM

## 2025-03-26 ENCOUNTER — OFFICE VISIT (OUTPATIENT)
Dept: ORTHOPEDIC SURGERY | Facility: CLINIC | Age: 65
End: 2025-03-26
Payer: MEDICARE

## 2025-03-26 VITALS — TEMPERATURE: 97.5 F | HEIGHT: 63 IN | BODY MASS INDEX: 38.62 KG/M2 | WEIGHT: 218 LBS

## 2025-03-26 DIAGNOSIS — M25.561 MECHANICAL PAIN OF RIGHT KNEE: ICD-10-CM

## 2025-03-26 DIAGNOSIS — M25.561 ACUTE PAIN OF RIGHT KNEE: Primary | ICD-10-CM

## 2025-03-26 DIAGNOSIS — M25.461 EFFUSION OF RIGHT KNEE: ICD-10-CM

## 2025-03-26 PROCEDURE — 1159F MED LIST DOCD IN RCRD: CPT | Performed by: NURSE PRACTITIONER

## 2025-03-26 PROCEDURE — 99213 OFFICE O/P EST LOW 20 MIN: CPT | Performed by: NURSE PRACTITIONER

## 2025-03-26 PROCEDURE — 1160F RVW MEDS BY RX/DR IN RCRD: CPT | Performed by: NURSE PRACTITIONER

## 2025-03-26 NOTE — PROGRESS NOTES
Patient Name: Alisha Connolly   YOB: 1960  Referring Primary Care Physician: Aarti Ogden APRN  BMI: Body mass index is 38.62 kg/m².    Chief Complaint:    Chief Complaint   Patient presents with    Right Knee - Pain        HPI: Pt of [facility] that has had a TKA on the left knee 2 years ago. Pt was dancing Sat and felt a pop  in knee and has had pain and is using a cane.  Pain is improving but she still has a hard time putting weight on her leg is offsetting it with a cane.    Alisha Connolly is a 65 y.o. female who presents today for evaluation of   Chief Complaint   Patient presents with    Right Knee - Pain   .      Subjective   Medications:   Home Medications:  Current Outpatient Medications on File Prior to Visit   Medication Sig    acetaminophen (TYLENOL) 500 MG tablet Take 1 tablet by mouth Every 6 (Six) Hours As Needed for Mild Pain. Indications: Pain    albuterol sulfate  (90 Base) MCG/ACT inhaler Inhale 2 puffs Every 4 (Four) Hours As Needed. Indications: Asthma (Patient taking differently: Inhale 2 puffs Every 4 (Four) Hours As Needed for Shortness of Air. Indications: Asthma)    amLODIPine (NORVASC) 5 MG tablet TAKE 1 TABLET BY MOUTH DAILY    estradiol (MINIVELLE, VIVELLE-DOT) 0.05 MG/24HR patch 1 patch 2 (Two) Times a Week.    estradiol (VAGIFEM) 10 MCG tablet vaginal tablet INSERT 1 TABLET INTRAVAGINALLY 2 TIMES A WEEK    Lancets (OneTouch Delica Plus [id number]) misc 1 each by Other route Daily.    metFORMIN (GLUCOPHAGE) 500 MG tablet TAKE 1 TABLET BY MOUTH TWICE DAILY WITH MEALS    olmesartan (BENICAR) 40 MG tablet TAKE 1 TABLET BY MOUTH DAILY (Patient taking differently: Take 1 tablet by mouth Every Morning.)    OneTouch Ultra test strip 1 each by Other route Daily.    rosuvastatin (CRESTOR) 5 MG tablet TAKE 1 TABLET BY MOUTH DAILY    cloNIDine (CATAPRES) 0.1 MG tablet Take 1 tablet by mouth 2 (Two) Times a Day As Needed for High Blood Pressure (Systolic BP > 180, Diastolic BP >  110). (Patient not taking: Reported on 3/26/2025)    HYDROcodone-acetaminophen (NORCO) 5-325 MG per tablet Take 1 tablet by mouth Every 6 (Six) Hours As Needed for Severe Pain for up to 3 days. (Patient not taking: Reported on 3/26/2025)    ketorolac (ACULAR) 0.5 % ophthalmic solution INSTILL 1 DROP IN LEFT EYE FOUR TIMES DAILY. START DROP 3 DAYS BEFORE SURGERY AND. CONTINUE ACCORDING TO POST-OP INSTRUCTIONS (Patient not taking: Reported on 3/26/2025)    prednisoLONE acetate (PRED FORTE) 1 % ophthalmic suspension SHAKE LIQUID AND INSTILL 1 DROP IN LEFT EYE FOUR TIMES DAILY. START DROP 3 DAYS BEFORE SURGERY AND. CONTINUE ACCORDING TO POST-OP INSTRUCTIONS (Patient not taking: Reported on 3/26/2025)     No current facility-administered medications on file prior to visit.     Current Medications:  Scheduled Meds:  Continuous Infusions:No current facility-administered medications for this visit.    PRN Meds:.    I have reviewed the patient's medical history in detail and updated the computerized patient record.  Review and summarization of old records includes:    Past Medical History:   Diagnosis Date    Abnormal Pap smear of cervix     Age-related nuclear cataract of both eyes 02/03/2020    Anal fissure 10/2021    Anal skin tag 08/2022    S/P EXCISION    Ankle fracture 12/2022    RIGHT    Arthrodesis status 03/08/2014    Asthma     Benign essential hypertension 06/05/2015    Continue lisinopril    Carpal tunnel syndrome     PING    Cervical spondylolysis     Chronic neck pain 07/19/2016    Closed nondisplaced fracture of lateral malleolus of right fibula 12/10/2022    NO SURGERY, SEEN AT [facility]    Cystitis 11/2017    Dry eye syndrome 02/03/2020    Environmental allergies     Exogenous hypertriglyceridemia 06/05/2017    Ganglion cyst of finger 01/2020    Hemorrhoids     History of COVID-19 05/2022    History of torn meniscus of left knee     Hormone replacement therapy (HRT)     Hyperchylomicronemia     Hyperlipidemia  2015    ASCVD 10-year risk ~10% Plan: · Start statin. Unclear whether arm numbness really 2/2 statin in the past.    Hyperopia of both eyes 2020    Keratoconjunctivitis sicca 2020    Knee pain, left     Lichen sclerosus     Lung nodule 2006    Obstructive sleep apnea syndrome 2004    Not using CPAP due to eye issues    RAMU (obstructive sleep apnea) 2024    Home sleep study.  Weight 207 pounds.  Mild RAMU with AHI 8.2 events per hour.  No sleep-related hypoxia.  The patient snored 55% of total monitoring time.    Palpitations     Pertussis 2016    [facility]    PNA (pneumonia)     PONV (postoperative nausea and vomiting)     Presbyopia 2020    Rotator cuff injury 2011    SEEN AT [facility] ER    Seasonal allergies     Slow to wake up after anesthesia     Snoring     Type 2 diabetes mellitus without complication 2015    Under good control Plan: · Reassured about use of metformin    Urinary urgency 2021        Past Surgical History:   Procedure Laterality Date    ANTERIOR CERVICAL DISCECTOMY W/ FUSION N/A 2014    C6-7, DR. ALEXEY IZAGUIRRE AT [facility]. TITANIUM PLATE    ANUS SURGERY N/A 2022    Procedure: ANAL TAG EXCISION;  Surgeon: Sarai Santiago MD;  Location: [facility] ENDOSCOPY;  Service: General;  Laterality: N/A;  pre-anal tag  post-same    CARPAL TUNNEL RELEASE Bilateral      SECTION          COLONOSCOPY N/A     COLONOSCOPY N/A 2022    EXTERNAL AND INTERNAL HEMORRHOIDS, GRADE 3-BANDED, RESCOPE IN 10 YRS, DR. SARAI SANTIAGO AT [facility]    HEMORRHOIDECTOMY N/A 2022    Procedure: HEMORRHOID BANDING x3;  Surgeon: Sarai Santiago MD;  Location: [facility] ENDOSCOPY;  Service: General;  Laterality: N/A;  pre-hemorrhoids  post-same    HYSTERECTOMY N/A     WITH BSO, FOR HEAVY BLEEDING    INCISIONAL BREAST BIOPSY Right     BENIGN    LAPAROSCOPIC LYSIS OF ADHESIONS N/A     OOPHORECTOMY      OOPHORECTOMY      SPHINCTEROTOMY N/A  2023    Procedure: EXAM UNDER ANESTHESIA, CHEMICAL SPHINCTEROTOMY WITH BOTOX;  Surgeon: Sarai Perez MD;  Location: [facility] MAIN OR;  Service: General;  Laterality: N/A;    SPHINCTEROTOMY N/A 2023    Procedure: chemical sphincterotomy with Botox and hemorrhoidectomy;  Surgeon: Sarai Perez MD;  Location: [facility] MAIN OR;  Service: General;  Laterality: N/A;    TOTAL KNEE ARTHROPLASTY Left 2023    Procedure: LEFT TOTAL KNEE ARTHROPLASTY WITH JACE NAVIGATION;  Surgeon: Tomás Rasmussen MD;  Location: [facility] OR [facility];  Service: Orthopedics;  Laterality: Left;        Social History     Occupational History    Not on file   Tobacco Use    Smoking status: Former     Current packs/day: 0.00     Average packs/day: 0.3 packs/day for 4.5 years (1.1 ttl pk-yrs)     Types: Cigarettes     Start date: 2009     Quit date: 3/7/2014     Years since quittin.0     Passive exposure: Past    Smokeless tobacco: Never   Vaping Use    Vaping status: Never Used   Substance and Sexual Activity    Alcohol use: Not Currently     Comment: 1/week    Drug use: Never    Sexual activity: Defer     Partners: Male     Birth control/protection: Post-menopausal, Hysterectomy      Social History     Social History Narrative    Not on file        Family History   Problem Relation Age of Onset    Arthritis Mother     Heart disease Mother     Arrhythmia Mother     Hypertension Father     Heart attack Father     Hyperlipidemia Father     Diabetes Father     Heart disease Father     Coronary artery disease Father     Diabetes Sister     Diabetes Paternal Grandmother     Cancer Cousin     Breast cancer Cousin     Cancer Maternal Great-Grandmother     Ovarian cancer Maternal Great-Grandmother     Malig Hyperthermia Neg Hx        ROS: 14 point review of systems was performed and all other systems were reviewed and are negative except for documented findings in HPI and today's encounter.     Allergies:   Allergies   Allergen  "Reactions    Penicillins Hives    Atorvastatin Paresthesia     Arm numbness            Rosuvastatin Paresthesia     Arm numbness      Latex Rash    Tetanus Toxoid, Adsorbed Rash     Constitutional:  Denies fever, shaking or chills   Eyes:  Denies change in visual acuity   HENT:  Denies nasal congestion or sore throat   Respiratory:  Denies cough or shortness of breath   Cardiovascular:  Denies chest pain or severe LE edema   GI:  Denies abdominal pain, nausea, vomiting, bloody stools or diarrhea   Musculoskeletal:  Numbness, tingling, pain, or loss of motor function only as noted above in history of present illness.  : Denies painful urination or hematuria  Integument:  Denies rash, lesion or ulceration   Neurologic:  Denies headache or focal weakness  Endocrine:  Denies lymphadenopathy  Psych:  Denies confusion or change in mental status   Hem:  Denies active bleeding    OBJECTIVE:  Physical Exam: 65 y.o. female  Wt Readings from Last 3 Encounters:   03/26/25 98.9 kg (218 lb)   12/31/24 101 kg (222 lb)   12/18/24 99.3 kg (219 lb)     Ht Readings from Last 1 Encounters:   03/26/25 160 cm (63\")     Body mass index is 38.62 kg/m².  Vitals:    03/26/25 0910   Temp: 97.5 °F (36.4 °C)     Vital signs reviewed.     General Appearance:    Alert, cooperative, in no acute distress                  Eyes: conjunctiva clear  ENT: external ears and nose atraumatic  CV: no peripheral edema  Resp: normal respiratory effort  Skin: no rashes or wounds; normal turgor  Psych: mood and affect appropriate  Lymph: no nodes appreciated  Neuro: gross sensation intact  Vascular:  Palpable peripheral pulse in noted extremity  Musculoskeletal Extremities: Skin is warm dry intact with good pulses and station there is no outward erythema deformity or effusion she has tenderness over the medial and lateral joint line and patellofemoral with crepitation extensor mechanisms appear to be intact patellar tendon and quad strength is equal " bilaterally calves are soft and nontender    Radiology: Reviewed      Study Result    Narrative & Impression   RIGHT KNEE, 2 VIEWS     HISTORY: Right knee injury and pain     COMPARISON: None available     FINDINGS: There is no fracture or dislocation. There is mild  tricompartmental degenerative change. There is minimal knee joint  effusion     IMPRESSION:  As above           This report was finalized on 3/23/2025 3:54 PM by Dr. Killian Ann M.D on Workstation: [id number]     Assessment:     ICD-10-CM ICD-9-CM   1. Acute pain of right knee  M25.561 719.46   2. Effusion of right knee  M25.461 719.06   3. Mechanical pain of right knee  M25.561 719.46        Procedures    Will check a mri asess tibial plateau fracture or meniscus tear   MDM/Plan:   The diagnosis(es), natural history, pathophysiology and treatment for diagnosis(es) were discussed. Opportunity given and questions answered.  Biomechanics of pertinent body areas discussed.  When appropriate, the use of ambulatory aids discussed.  BMI:  The concept of BMI body mass index and its importance and implications discussed.    EXERCISES:  Advice on benefits of, and types of regular/moderate exercise pertaining to orthopedic diagnosis(es).  MEDICATIONS:  The risks, benefits, warnings,side effects and alternatives of medications discussed.  Inflammation/pain control; with cold, heat, elevation and/or liniments discussed as appropriate  Cortisone Injection. See procedure note.  PT referral.  SPECIALTY REFERRAL  MRI.  MEDICAL RECORDS reviewed from other provider(s) for past and current medical history pertinent to this complaint.      3/26/2025    Much of this encounter note is an electronic transcription/translation of spoken language to printed text. The electronic translation of spoken language may permit erroneous, or at times, nonsensical words or phrases to be inadvertently transcribed; Although I have reviewed the note for such errors, some may still  exist

## 2025-03-31 ENCOUNTER — TELEPHONE (OUTPATIENT)
Dept: ORTHOPEDIC SURGERY | Facility: CLINIC | Age: 65
End: 2025-03-31
Payer: MEDICARE

## 2025-04-01 ENCOUNTER — PATIENT MESSAGE (OUTPATIENT)
Dept: ORTHOPEDIC SURGERY | Facility: CLINIC | Age: 65
End: 2025-04-01
Payer: MEDICARE

## 2025-04-11 NOTE — TELEPHONE ENCOUNTER
Called and spoke with patient.  We have her scheduled with Dr. Cantu next week on Wednesday, 4/16 at [facility].

## 2025-04-14 ENCOUNTER — OFFICE VISIT (OUTPATIENT)
Dept: FAMILY MEDICINE CLINIC | Facility: CLINIC | Age: 65
End: 2025-04-14
Payer: MEDICARE

## 2025-04-14 VITALS
HEIGHT: 63 IN | WEIGHT: 210 LBS | TEMPERATURE: 97.1 F | DIASTOLIC BLOOD PRESSURE: 72 MMHG | HEART RATE: 74 BPM | RESPIRATION RATE: 12 BRPM | OXYGEN SATURATION: 97 % | SYSTOLIC BLOOD PRESSURE: 112 MMHG | BODY MASS INDEX: 37.21 KG/M2

## 2025-04-14 DIAGNOSIS — G89.29 CHRONIC LEFT SHOULDER PAIN: ICD-10-CM

## 2025-04-14 DIAGNOSIS — M25.512 CHRONIC LEFT SHOULDER PAIN: ICD-10-CM

## 2025-04-14 DIAGNOSIS — E11.9 TYPE 2 DIABETES MELLITUS WITHOUT COMPLICATION, WITHOUT LONG-TERM CURRENT USE OF INSULIN: ICD-10-CM

## 2025-04-14 DIAGNOSIS — I10 BENIGN ESSENTIAL HYPERTENSION: ICD-10-CM

## 2025-04-14 DIAGNOSIS — E78.2 MIXED HYPERLIPIDEMIA: ICD-10-CM

## 2025-04-14 DIAGNOSIS — Z00.00 ENCOUNTER FOR ANNUAL WELLNESS VISIT (AWV) IN MEDICARE PATIENT: Primary | ICD-10-CM

## 2025-04-14 NOTE — PROGRESS NOTES
The ABCs of the Annual Wellness Visit  Subsequent Medicare Wellness Visit    Subjective    Alisha Connolly is a 65 y.o. female who presents for a Subsequent Medicare Wellness Visit.    The following portions of the patient's history were reviewed and   updated as appropriate: allergies, current medications, past family history, past medical history, past social history, past surgical history, and problem list.    Compared to one year ago, the patient feels her physical   health is the same.    Compared to one year ago, the patient feels her mental   health is the same.      Recent Hospitalizations:  She was not admitted to the hospital during the last year.       Current Medical Providers:  Patient Care Team:  Aarti Ogden APRN as PCP - General (Nurse Practitioner)  Sarai Perez MD as Consulting Physician (Colon and Rectal Surgery)  Inna Beaver MD as Consulting Physician (Cardiology)  Toma Rasmussen APRN as Nurse Practitioner (Orthopedic Surgery)    Outpatient Medications Prior to Visit   Medication Sig Dispense Refill    acetaminophen (TYLENOL) 500 MG tablet Take 1 tablet by mouth Every 6 (Six) Hours As Needed for Mild Pain. Indications: Pain      albuterol sulfate  (90 Base) MCG/ACT inhaler Inhale 2 puffs Every 4 (Four) Hours As Needed. Indications: Asthma (Patient taking differently: Inhale 2 puffs Every 4 (Four) Hours As Needed for Shortness of Air. Indications: Asthma)      amLODIPine (NORVASC) 5 MG tablet TAKE 1 TABLET BY MOUTH DAILY 90 tablet 1    cloNIDine (CATAPRES) 0.1 MG tablet Take 1 tablet by mouth 2 (Two) Times a Day As Needed for High Blood Pressure (Systolic BP > 180, Diastolic BP > 110). (Patient taking differently: Take 1 tablet by mouth 2 (Two) Times a Day As Needed for High Blood Pressure (SBP > 180, DBP > 110).) 15 tablet 0    estradiol (MINIVELLE, VIVELLE-DOT) 0.05 MG/24HR patch 1 patch 2 (Two) Times a Week.      estradiol (VAGIFEM) 10 MCG tablet vaginal tablet INSERT 1 TABLET  INTRAVAGINALLY 2 TIMES A WEEK      Lancets (OneTouch Delica Plus Ynecti63T) misc 1 each by Other route Daily. 100 each 6    metFORMIN (GLUCOPHAGE) 500 MG tablet TAKE 1 TABLET BY MOUTH TWICE DAILY WITH MEALS 180 tablet 0    olmesartan (BENICAR) 40 MG tablet TAKE 1 TABLET BY MOUTH DAILY (Patient taking differently: Take 1 tablet by mouth Every Morning.) 90 tablet 1    OneTouch Ultra test strip 1 each by Other route Daily. 400 each 1    rosuvastatin (CRESTOR) 5 MG tablet TAKE 1 TABLET BY MOUTH DAILY 90 tablet 0    ketorolac (ACULAR) 0.5 % ophthalmic solution INSTILL 1 DROP IN LEFT EYE FOUR TIMES DAILY. START DROP 3 DAYS BEFORE SURGERY AND. CONTINUE ACCORDING TO POST-OP INSTRUCTIONS (Patient not taking: Reported on 3/26/2025)      prednisoLONE acetate (PRED FORTE) 1 % ophthalmic suspension        No facility-administered medications prior to visit.       No opioid medication identified on active medication list. I have reviewed chart for other potential  high risk medication/s and harmful drug interactions in the elderly.        Aspirin is not on active medication list.  Aspirin use is not indicated based on review of current medical condition/s. Risk of harm outweighs potential benefits.  .    Patient Active Problem List   Diagnosis    Age-related nuclear cataract of both eyes    Arthrodesis status    Benign essential hypertension    Chronic neck pain    Dry eye syndrome    Exogenous hypertriglyceridemia    S/P hysterectomy with oophorectomy    Hyperlipidemia    Hyperopia of both eyes    Hypertension    Keratoconjunctivitis sicca    Lung nodule    RAMU (obstructive sleep apnea)    History of solitary pulmonary nodule    Presbyopia    Type 2 diabetes mellitus without complication    Anal skin tag    Encounter for screening colonoscopy    Hemorrhoids    Closed nondisplaced fracture of lateral malleolus of right fibula    Anal fissure    Arthritis of left knee    Internal hemorrhoids with complication    External  "hemorrhoids with complication    Class 2 severe obesity due to excess calories with serious comorbidity and body mass index (BMI) of 38.0 to 38.9 in adult    RAMU on CPAP    Polyarthralgia    Chronic left shoulder pain     Advance Care Planning   Advance Care Planning     Advance Directive is not on file.  ACP discussion was held with the patient during this visit. Patient does not have an advance directive, information provided.     Objective    Vitals:    25 1318   BP: 112/72   Pulse: 74   Resp: 12   Temp: 97.1 °F (36.2 °C)   TempSrc: Infrared   SpO2: 97%   Weight: 95.3 kg (210 lb)   Height: 160 cm (63\")   PainSc: 6    PainLoc: Knee     Estimated body mass index is 37.2 kg/m² as calculated from the following:    Height as of this encounter: 160 cm (63\").    Weight as of this encounter: 95.3 kg (210 lb).    Class 2 Severe Obesity (BMI >=35 and <=39.9). Obesity-related health conditions include the following: hypertension, impaired fasting glucose, dyslipidemias, and osteoarthritis. Obesity is improving with lifestyle modifications. BMI is is above average; BMI management plan is completed. We discussed portion control, increasing exercise, and Information on healthy weight added to patient's after visit summary.      Does the patient have evidence of cognitive impairment? No          HEALTH RISK ASSESSMENT    Smoking Status:  Social History     Tobacco Use   Smoking Status Former    Current packs/day: 0.00    Average packs/day: 0.3 packs/day for 4.5 years (1.1 ttl pk-yrs)    Types: Cigarettes    Start date: 2009    Quit date: 3/7/2014    Years since quittin.1    Passive exposure: Past   Smokeless Tobacco Never     Alcohol Consumption:  Social History     Substance and Sexual Activity   Alcohol Use Not Currently    Comment: 1/week     Fall Risk Screen:    NATHALY Fall Risk Assessment was completed, and patient is at LOW risk for falls.Assessment completed on:2025    Depression Screening:      " 2025     2:00 PM   PHQ-2/PHQ-9 Depression Screening   Little interest or pleasure in doing things Not at all   Feeling down, depressed, or hopeless Not at all   Trouble falling or staying asleep, or sleeping too much Not at all   Feeling tired or having little energy Not at all   Poor appetite or overeating Not at all   Feeling bad about yourself - or that you are a failure or have let yourself or your family down Not at all   Trouble concentrating on things, such as reading the newspaper or watching television Not at all   Moving or speaking so slowly that other people could have noticed? Or the opposite - being so fidgety or restless that you have been moving around a lot more than usual. Not at all   Thoughts that you would be better off dead or hurting yourself in some way Not at all   Patient Health Questionnaire-9 Score 0       Health Habits and Functional and Cognitive Screenin/14/2025    11:42 AM   Functional & Cognitive Status   Do you have difficulty preparing food and eating? No   Do you have difficulty bathing yourself, getting dressed or grooming yourself? No   Do you have difficulty using the toilet? No   Do you have difficulty moving around from place to place? No   Do you have trouble with steps or getting out of a bed or a chair? No   Current Diet Diabetic Diet   Dental Exam Up to date   Eye Exam Up to date   Exercise (times per week) 3 times per week   Current Exercises Include Walking   Do you need help using the phone?  No   Are you deaf or do you have serious difficulty hearing?  No   Do you need help to go to places out of walking distance? No   Do you need help shopping? No   Do you need help preparing meals?  No   Do you need help with housework?  No   Do you need help with laundry? No   Do you need help taking your medications? No   Do you need help managing money? No   Do you ever drive or ride in a car without wearing a seat belt? No   Have you felt unusual stress, anger or  loneliness in the last month? No   Who do you live with? Spouse   If you need help, do you have trouble finding someone available to you? No   Have you been bothered in the last four weeks by sexual problems? No   Do you have difficulty concentrating, remembering or making decisions? No       Age-appropriate Screening Schedule:  Refer to the list below for future screening recommendations based on patient's age, sex and/or medical conditions. Orders for these recommended tests are listed in the plan section. The patient has been provided with a written plan.    Health Maintenance   Topic Date Due    HEMOGLOBIN A1C  04/29/2025    ZOSTER VACCINE (1 of 2) 08/29/2025 (Originally 1/7/2008)    COVID-19 Vaccine (4 - 2024-25 season) 10/14/2025 (Originally 9/1/2024)    DXA SCAN  10/14/2025 (Originally 8/31/2024)    HEPATITIS C SCREENING  04/14/2026 (Originally 8/15/2022)    INFLUENZA VACCINE  07/01/2025    DIABETIC FOOT EXAM  10/29/2025    LIPID PANEL  10/29/2025    URINE MICROALBUMIN-CREATININE RATIO (uACR)  10/29/2025    DIABETIC EYE EXAM  04/01/2026    ANNUAL WELLNESS VISIT  04/14/2026    TDAP/TD VACCINES (4 - Td or Tdap) 07/19/2026    MAMMOGRAM  11/04/2026    COLORECTAL CANCER SCREENING  09/19/2032    Pneumococcal Vaccine 50+  Completed                  CMS Preventative Services Quick Reference  Risk Factors Identified During Encounter  Fall Risk-High or Moderate: Discussed Fall Prevention in the home  The above risks/problems have been discussed with the patient.  Pertinent information has been shared with the patient in the After Visit Summary.  An After Visit Summary and PPPS were made available to the patient.    Follow Up:   Next Medicare Wellness visit to be scheduled in 1 year.       Additional E&M Note during same encounter follows:  Patient has multiple medical problems which are significant and separately identifiable that require additional work above and beyond the Medicare Wellness Visit.      Chief  Complaint  Medicare Wellness-subsequent    Subjective        HPI  Alisha Connolly is also being seen today for AWV and chronic health maintenance    History of Present Illness  The patient presents for evaluation of diabetes, right knee meniscus tear, left shoulder pain, sinus infection vs allergies, and skin issues.    She has been experiencing elevated blood glucose levels, consistently measuring between 200 and 250. Despite dietary advice from a diabetes counselor, she has been unable to adhere to the recommended diet due to its impact on her blood sugar levels. She has been utilizing a continuous glucose monitor, which has been beneficial in managing her condition. She has been informed that she may be able to reduce her metformin dosage in the future. She has been monitoring her blood glucose levels for approximately 1.5 months and has initiated a diet to facilitate weight loss. She has been on the HMR program, during which she has lost 10 pounds and observed an improvement in her blood glucose levels. Her current regimen includes a morning shake with strawberries and blueberries, up to 4 shakes per day, 2 entrees for lunch and dinner, and occasional bars to prevent cheating. She also incorporates fresh green beans into her diet and maintains hydration with water. She has consumed ice cream once in the past 2 weeks, and her typical breakfast includes an omelet with fresh vegetables and a side of fruit. She occasionally adds half-and-half to her coffee and has had a glass of orange juice once in the past 2 weeks. She is not fasting today.    She reports feeling unwell, with symptoms suggestive of a sinus infection or allergies. She experiences a persistent cough at night, which often leads to morning congestion and expectoration of unpleasant material. These symptoms have been present for the past week. She does not believe she has contracted a recent illness from her , who was treated with antibiotics, an  inhaler, and cough syrup 2 weeks ago. She reports no shortness of breath, chest pain, or heart palpitations. She reports no gastrointestinal symptoms such as nausea, vomiting, diarrhea, constipation, or changes in stool characteristics. She reports no presence of blood in her stool. She uses a CPAP machine at night but did not use it last night due to discomfort. She does not take antihistamines like Zyrtec or Claritin because they cause her head to feel blocked and she has heard they may contribute to Alzheimer's disease. She does not use nasal sprays. She has purchased local honey from [employer] and plans to try it.    She has a torn meniscus in her right knee and is scheduled to see Dr. Cantu on Wednesday. The MRI showed a large posterior tear in the meniscus. She has difficulty getting up from sitting and maintaining balance but is okay once she is up and moving. She injured her knee while dancing at the [employer], resulting in an inability to bear weight on it for 3 days. She left the ballroom in a wheelchair and was taken to the ER the next afternoon. Dr. Rasmussen previously informed her that both knees were bone-on-bone. She is considering gel injections but is unsure if insurance will cover them.    She experiences significant pain in her left shoulder when weight is applied or when moving her arm. She can not lie on that side due to the pain. The pain has been exacerbated by lifting herself into the car, especially during the week when she could not put any weight on her leg. The pain has been present for a couple of years and is particularly bothersome when reaching back to put on her bra. She has previously lost the use of her arm before undergoing surgery to remove a vertebra from her spinal cord and insert a plate. The pain is intermittent but has worsened since injuring her knee due to increased lifting. She has not been icing her shoulder.  No falls or trauma.    She had a mammogram done in  "October at [facility], which did not reveal any abnormalities, so they did not proceed with a sonogram. However, she still experiences occasional pain. She had a bone density test in 2022, which showed that her bones are in good condition. She has a lot of popping and cracking in her joints.    She reports no memory issues and feels that her physical and mental health are the same as last year. She has not had any overnight hospital stays in the last year. She sees Sarai Perez for her colonoscopy and Dr. Mara Beaver for cardiology clearance for her knee replacement. She also sees an eye doctor and recently had cataract surgery and new lenses placed. She is not taking baby aspirin daily. She takes rosuvastatin once a week, which has improved her symptoms of weakness. This issue has been ongoing for the last 30 years, with similar reactions to other medications like Lipitor.    She occasionally experiences dizziness when sitting, which she attributes to reduced food intake or forgetting to take her metformin. Her lowest recorded blood sugar level is between 80 and 90. She monitors her blood pressure at home approximately once a month.    She went in for a skin check with [facility] and was prescribed a chemotherapeutic ointment to apply daily for 2 weeks. At the end of the 2 weeks, her skin was raw and painful. She used Aquaphor afterward but stopped using it.    MEDICATIONS  Current            Objective   Vital Signs:  /72   Pulse 74   Temp 97.1 °F (36.2 °C) (Infrared)   Resp 12   Ht 160 cm (63\")   Wt 95.3 kg (210 lb)   SpO2 97%   BMI 37.20 kg/m²     Physical Exam  Vitals and nursing note reviewed.   Constitutional:       General: She is not in acute distress.     Appearance: She is well-developed. She is not ill-appearing.   HENT:      Head: Normocephalic and atraumatic.      Right Ear: Ear canal and external ear normal.      Left Ear: Ear canal and external ear normal.      Ears:      " Comments: Bilateral TM cloudy, no erythema     Mouth/Throat:      Mouth: Mucous membranes are moist.      Pharynx: Uvula midline. No posterior oropharyngeal erythema.   Eyes:      General: No scleral icterus.        Right eye: No discharge.         Left eye: No discharge.      Conjunctiva/sclera: Conjunctivae normal.      Pupils: Pupils are equal, round, and reactive to light.   Neck:      Thyroid: No thyromegaly.   Cardiovascular:      Rate and Rhythm: Normal rate and regular rhythm.   Pulmonary:      Effort: Pulmonary effort is normal.      Breath sounds: Normal breath sounds. No wheezing.   Abdominal:      General: Bowel sounds are normal. There is no distension.      Palpations: Abdomen is soft.      Tenderness: There is no abdominal tenderness.   Musculoskeletal:         General: No deformity.      Left shoulder: Tenderness present. No swelling, deformity, bony tenderness or crepitus. Decreased range of motion.      Cervical back: Neck supple.      Comments: Cane with ambulation, antalgic gait-able to step up and down exam table with assist  Left shoulder pain diffuse across top of shoulder and laterally distal shoulder  Pain with both internal and external rotation   Lymphadenopathy:      Cervical: No cervical adenopathy.   Skin:     General: Skin is warm and dry.      Comments: Erythematous patches, papule across mid upper chest   Neurological:      General: No focal deficit present.      Mental Status: She is alert and oriented to person, place, and time.   Psychiatric:         Mood and Affect: Mood normal.         Behavior: Behavior normal.         Thought Content: Thought content normal.         Physical Exam      Vital Signs  Blood pressure is normal.                Results                  Assessment and Plan   Diagnoses and all orders for this visit:    1. Encounter for annual wellness visit (AWV) in Medicare patient (Primary)    2. Type 2 diabetes mellitus without complication, without long-term  current use of insulin  -     Hemoglobin A1c  -     Microalbumin / Creatinine Urine Ratio - Urine, Clean Catch    3. Benign essential hypertension  -     Comprehensive Metabolic Panel  -     Microalbumin / Creatinine Urine Ratio - Urine, Clean Catch    4. Mixed hyperlipidemia  -     Lipid Panel With LDL / HDL Ratio    5. Chronic left shoulder pain  -     Ambulatory Referral to Sports Medicine        Assessment & Plan  1. Diabetes mellitus.  Her A1c levels have consistently remained within the borderline range. She has been advised to continue her current dietary regimen and weight loss efforts. Fasting labs will be ordered to monitor her A1c levels. If her A1c levels remain stable, discontinuation of metformin may be considered.  She is using a CGM which would give her immediate feedback if blood sugars are going back up, so I would feel comfortable to hold the metformin.  Her A1c's have been below 7.    2. Right knee meniscus tear.  She has been referred to Dr. Cantu for further evaluation and management of her right knee meniscus tear.    3.  Left shoulder pain.  She has been advised to apply ice to her shoulder for 30 minutes prior to bedtime. A referral to sports medicine will be made for further evaluation. Pain is more diffuse than I would expect for rotator cuff tendinopathy.  We did an x-ray last year that was normal.    4.  Congestion  She has been advised to use Flonase nasal spray before bedtime and to consume a teaspoon of honey before bedtime to alleviate postnasal drip.    5. Skin issues.  She has been advised to continue using Aquaphor as needed.  Follow with Derm as directed    6. Health maintenance.  She has been advised to engage in physical activities that elevate her heart rate for approximately 30 minutes, 5 days a week, and to incorporate various problem-solving tasks, music, and body movements into her routine as she is able. The use of a multivitamin supplement appropriate for her age and  gender has been recommended.    7. Blood pressure management.  Her blood pressure readings have been within the normal range. She has been advised to monitor her blood pressure twice weekly at different times of the day and to report the readings.  We can consider reducing blood pressure medications if blood pressure consistently at or below goal.  She will send blood pressure readings to me.    8. Medication management.  Will refill meds as appropriate once I see her labs back.    Appropriate health maintenance and prevention topics specific for this patient were discussed today.  Additionally, health goals, and health concerns addressed as appropriate.  Pt was encouraged to stay up to date on recommended screenings and vaccines based on USPSTF guidelines.     Will need to obtain bone density and mammo's from GYN.            Follow Up   No follow-ups on file.  Patient was given instructions and counseling regarding her condition or for health maintenance advice. Please see specific information pulled into the AVS if appropriate.    Patient or patient representative verbalized consent for the use of Ambient Listening during the visit with  KANWAL Barker for chart documentation. 4/14/2025  18:17 EDT

## 2025-04-16 ENCOUNTER — OFFICE VISIT (OUTPATIENT)
Dept: ORTHOPEDIC SURGERY | Facility: CLINIC | Age: 65
End: 2025-04-16
Payer: MEDICARE

## 2025-04-16 VITALS — TEMPERATURE: 97.3 F | WEIGHT: 211.6 LBS | HEIGHT: 63 IN | BODY MASS INDEX: 37.49 KG/M2

## 2025-04-16 DIAGNOSIS — M17.10 ARTHRITIS OF KNEE: Primary | ICD-10-CM

## 2025-04-16 RX ORDER — HYDROCODONE BITARTRATE AND ACETAMINOPHEN 5; 325 MG/1; MG/1
1 TABLET ORAL EVERY 6 HOURS PRN
COMMUNITY

## 2025-04-16 NOTE — PROGRESS NOTES
"  Patient: Alisha Connolly    YOB: 1960    Medical Record Number: [id number]    Chief Complaints: Referral for right knee injury    History of Present Illness:     65 y.o. female patient seen for evaluation of her right knee.  She has a history of osteoarthritis and has previously been told that she needs a replacement.  Her knee was doing fine up until about 2 weeks ago.  She was dancing to a [employer] song when she felt something pop in the knee.  She says that she immediately knew that she \"blew it out\".  She could hardly stand or walk.  Over the past couple of weeks, the pain has steadily gotten better.  Current pain is moderate and aching.  Most the pain seems to be lateral and posterior.  She is ambulating with a cane.    Allergies   Allergen Reactions    Penicillins Hives    Atorvastatin Paresthesia     Arm numbness            Rosuvastatin Paresthesia     Arm numbness      Latex Rash    Tetanus Toxoid, Adsorbed Rash       Current Outpatient Medications:     acetaminophen (TYLENOL) 500 MG tablet, Take 1 tablet by mouth Every 6 (Six) Hours As Needed for Mild Pain. Indications: Pain, Disp: , Rfl:     albuterol sulfate  (90 Base) MCG/ACT inhaler, Inhale 2 puffs Every 4 (Four) Hours As Needed. Indications: Asthma (Patient taking differently: Inhale 2 puffs Every 4 (Four) Hours As Needed for Shortness of Air. Indications: Asthma), Disp: , Rfl:     amLODIPine (NORVASC) 5 MG tablet, TAKE 1 TABLET BY MOUTH DAILY, Disp: 90 tablet, Rfl: 1    cloNIDine (CATAPRES) 0.1 MG tablet, Take 1 tablet by mouth 2 (Two) Times a Day As Needed for High Blood Pressure (Systolic BP > 180, Diastolic BP > 110). (Patient taking differently: Take 1 tablet by mouth 2 (Two) Times a Day As Needed for High Blood Pressure (SBP > 180, DBP > 110).), Disp: 15 tablet, Rfl: 0    estradiol (MINIVELLE, VIVELLE-DOT) 0.05 MG/24HR patch, 1 patch 2 (Two) Times a Week., Disp: , Rfl:     estradiol (VAGIFEM) 10 MCG tablet vaginal tablet, " INSERT 1 TABLET INTRAVAGINALLY 2 TIMES A WEEK, Disp: , Rfl:     HYDROcodone-acetaminophen (NORCO) 5-325 MG per tablet, Take 1 tablet by mouth Every 6 (Six) Hours As Needed., Disp: , Rfl:     Lancets (OneTouch Delica Plus [id number]) misc, 1 each by Other route Daily., Disp: 100 each, Rfl: 6    metFORMIN (GLUCOPHAGE) 500 MG tablet, TAKE 1 TABLET BY MOUTH TWICE DAILY WITH MEALS, Disp: 180 tablet, Rfl: 0    olmesartan (BENICAR) 40 MG tablet, TAKE 1 TABLET BY MOUTH DAILY (Patient taking differently: Take 1 tablet by mouth Every Morning.), Disp: 90 tablet, Rfl: 1    OneTouch Ultra test strip, 1 each by Other route Daily., Disp: 400 each, Rfl: 1    rosuvastatin (CRESTOR) 5 MG tablet, TAKE 1 TABLET BY MOUTH DAILY, Disp: 90 tablet, Rfl: 0    Past Medical History:   Diagnosis Date    Abnormal Pap smear of cervix     Age-related nuclear cataract of both eyes 02/03/2020    Anal fissure 10/2021    Anal skin tag 08/2022    S/P EXCISION    Ankle fracture 12/2022    RIGHT    Arthrodesis status 03/08/2014    Asthma     Benign essential hypertension 06/05/2015    Continue lisinopril    Carpal tunnel syndrome     PING    Cervical disc disorder 3/94    Titanium plate    Cervical spondylolysis     Chronic neck pain 07/19/2016    Closed nondisplaced fracture of lateral malleolus of right fibula 12/10/2022    NO SURGERY, SEEN AT [facility]    Cystitis 11/2017    Dry eye syndrome 02/03/2020    Environmental allergies     Exogenous hypertriglyceridemia 06/05/2017    Ganglion cyst of finger 01/2020    Hemorrhoids     History of COVID-19 05/2022    History of torn meniscus of left knee     Hormone replacement therapy (HRT)     Hyperchylomicronemia     Hyperlipidemia 06/05/2015    ASCVD 10-year risk ~10% Plan: · Start statin. Unclear whether arm numbness really 2/2 statin in the past.    Hyperopia of both eyes 02/03/2020    Keratoconjunctivitis sicca 02/03/2020    Knee pain, left     Lichen sclerosus     Lung nodule 06/01/2006    Obstructive sleep  apnea syndrome 2004    Not using CPAP due to eye issues    RAMU (obstructive sleep apnea) 2024    Home sleep study.  Weight 207 pounds.  Mild RAMU with AHI 8.2 events per hour.  No sleep-related hypoxia.  The patient snored 55% of total monitoring time.    Palpitations     Pertussis 2016    [facility]    PNA (pneumonia)     PONV (postoperative nausea and vomiting)     Presbyopia 2020    Rotator cuff injury 2011    SEEN AT [facility] ER    Seasonal allergies     Slow to wake up after anesthesia     Snoring     Tear of meniscus of knee     Left    Type 2 diabetes mellitus without complication 2015    Under good control Plan: · Reassured about use of metformin    Urinary urgency 2021       Past Surgical History:   Procedure Laterality Date    ANKLE OPEN REDUCTION INTERNAL FIXATION      ANTERIOR CERVICAL DISCECTOMY W/ FUSION N/A 2014    C6-7, DR. ALEXEY IZAGUIRRE AT [facility]. TITANIUM PLATE    ANUS SURGERY N/A 2022    Procedure: ANAL TAG EXCISION;  Surgeon: Bradley Santiago MD;  Location:  [facility];  Service: General;  Laterality: N/A;  pre-anal tag  post-same    CARPAL TUNNEL RELEASE Bilateral      SECTION          COLONOSCOPY N/A     COLONOSCOPY N/A 2022    EXTERNAL AND INTERNAL HEMORRHOIDS, GRADE 3-BANDED, RESCOPE IN 10 YRS, DR. BRADLEY SANTIAGO AT [facility]    HAND SURGERY      Carpal tunnel    HEMORRHOIDECTOMY N/A 2022    Procedure: HEMORRHOID BANDING x3;  Surgeon: Bradley Santiago MD;  Location:  [facility];  Service: General;  Laterality: N/A;  pre-hemorrhoids  post-same    HERNIA REPAIR      HYSTERECTOMY N/A     WITH BSO, FOR HEAVY BLEEDING    INCISIONAL BREAST BIOPSY Right     BENIGN    JOINT REPLACEMENT      Knee left    LAPAROSCOPIC LYSIS OF ADHESIONS N/A     NECK SURGERY      OOPHORECTOMY      OOPHORECTOMY      SPHINCTEROTOMY N/A 2023    Procedure: EXAM UNDER ANESTHESIA, CHEMICAL SPHINCTEROTOMY WITH  BOTOX;  Surgeon: Sarai Perez MD;  Location: [facility] OR;  Service: General;  Laterality: N/A;    SPHINCTEROTOMY N/A 2023    Procedure: chemical sphincterotomy with Botox and hemorrhoidectomy;  Surgeon: Sarai Perez MD;  Location: [facility] OR;  Service: General;  Laterality: N/A;    TONSILLECTOMY      TOTAL KNEE ARTHROPLASTY Left 2023    Procedure: LEFT TOTAL KNEE ARTHROPLASTY WITH JACE NAVIGATION;  Surgeon: Tomás Rasmussen MD;  Location: [facility];  Service: Orthopedics;  Laterality: Left;       Social History     Occupational History    Not on file   Tobacco Use    Smoking status: Former     Current packs/day: 0.00     Average packs/day: 0.3 packs/day for 4.5 years (1.1 ttl pk-yrs)     Types: Cigarettes     Start date: 2009     Quit date: 3/7/2014     Years since quittin.1     Passive exposure: Past    Smokeless tobacco: Never   Vaping Use    Vaping status: Never Used   Substance and Sexual Activity    Alcohol use: Not Currently     Comment: 1/week    Drug use: Never    Sexual activity: Defer     Partners: Male     Birth control/protection: Post-menopausal, Hysterectomy      Social History     Social History Narrative    Not on file       Family History   Problem Relation Age of Onset    Arthritis Mother     Heart disease Mother     Arrhythmia Mother     Hypertension Father     Heart attack Father     Hyperlipidemia Father     Diabetes Father     Heart disease Father     Coronary artery disease Father     Diabetes Sister     Diabetes Paternal Grandmother     Cancer Cousin     Breast cancer Cousin     Cancer Maternal Great-Grandmother     Ovarian cancer Maternal Great-Grandmother     Malig Hyperthermia Neg Hx        Review of Systems:      Constitutional: Denies fever, shaking or chills   Eyes: Denies change in visual acuity   HEENT: Denies nasal congestion or sore throat   Respiratory: Denies cough or shortness of breath   Cardiovascular: Denies chest pain or  "edema  Endocrine: Denies tremors, palpitations, intolerance of heat or cold, polyuria, polydipsia.  GI: Denies abdominal pain, nausea, vomiting, bloody stools or diarrhea  : Denies frequency, urgency, incontinence, retention, or nocturia.  Musculoskeletal: Denies numbness, tingling or loss of motor function except as above  Integument: Denies rash, lesion or ulceration   Neurologic: Denies headache or focal weakness, deficits  Heme: Denies spontaneous or excessive bleeding, epistaxis, hematuria, melena, fatigue, enlarged or tender lymph nodes.      All other pertinent positives and negatives as noted above in HPI.    Physical Exam:   65 y.o. female  Vitals:    04/16/25 1346   Temp: 97.3 °F (36.3 °C)   TempSrc: Temporal   Weight: 96 kg (211 lb 9.6 oz)   Height: 160 cm (63\")     General:  Patient is awake and alert.  Appears in no acute distress or discomfort.    Psych:  Affect and demeanor are appropriate.    Extremities:  Right knee is examined.  Skin is benign.  Moderate medial compartment tenderness.  Motion:0-115.  Normal motor and sensory function in the lower leg and foot.  Palpable pedal pulses.  Brisk capillary refill.    Imaging: Previous x-rays of the right knee are reviewed and show no obvious abnormality.      MRI of the right knee is reviewed along with the report.  She has a radial tear of the medial meniscus just adjacent to the root.  It does not look like a true root avulsion.  There is partial extrusion of the meniscus.  She has tricompartment osteoarthritis but it appears she has fairly advanced degenerative changes in the medial compartment with subchondral cyst formation in the tibia.    Assessment/Plan:  Right knee osteoarthritis with medial meniscal tear and partial extrusion    We discussed the natural history of this condition and all available treatment options.  I explained that conservative treatment is very reasonable.  Ultimately she is headed towards an arthroplasty but there is no " "hurry to consider that at this point.  We discussed the options in detail.  She is planning to attend the [facility] in the near future and she says that she is interested in what ever \"quick fix\" could hopefully optimize her ability to participate during that event.  I told her that I think a shot is her best bet.  She says she had a shot in the left knee before she had it replaced.  It really only lasted a couple of weeks.  I told her that if she wants to optimize the chance that the shot helps then she might want to wait and get the injection just a few days before the therapy.  She was fine with that.  We are going to set up an appointment for her to come back in on the Monday before therapy.  I offered to refer her to PT but she declined.    Medhat Cantu MD    04/16/2025   "

## 2025-04-17 ENCOUNTER — LAB (OUTPATIENT)
Dept: LAB | Facility: HOSPITAL | Age: 65
End: 2025-04-17
Payer: MEDICARE

## 2025-04-17 LAB
ALBUMIN SERPL-MCNC: 4.6 G/DL (ref 3.5–5.2)
ALBUMIN UR-MCNC: <1.2 MG/DL
ALBUMIN/GLOB SERPL: 1.8 G/DL
ALP SERPL-CCNC: 43 U/L (ref 39–117)
ALT SERPL W P-5'-P-CCNC: 39 U/L (ref 1–33)
ANION GAP SERPL CALCULATED.3IONS-SCNC: 10 MMOL/L (ref 5–15)
AST SERPL-CCNC: 36 U/L (ref 1–32)
BILIRUB SERPL-MCNC: 0.7 MG/DL (ref 0–1.2)
BUN SERPL-MCNC: 19 MG/DL (ref 8–23)
BUN/CREAT SERPL: 20.9 (ref 7–25)
CALCIUM SPEC-SCNC: 9.8 MG/DL (ref 8.6–10.5)
CHLORIDE SERPL-SCNC: 104 MMOL/L (ref 98–107)
CHOLEST SERPL-MCNC: 198 MG/DL (ref 0–200)
CO2 SERPL-SCNC: 24 MMOL/L (ref 22–29)
CREAT SERPL-MCNC: 0.91 MG/DL (ref 0.57–1)
CREAT UR-MCNC: 262.6 MG/DL
EGFRCR SERPLBLD CKD-EPI 2021: 69.3 ML/MIN/1.73
GLOBULIN UR ELPH-MCNC: 2.5 GM/DL
GLUCOSE SERPL-MCNC: 130 MG/DL (ref 65–99)
HBA1C MFR BLD: 6.7 % (ref 4.8–5.6)
HDLC SERPL-MCNC: 46 MG/DL (ref 40–60)
LDLC SERPL CALC-MCNC: 128 MG/DL (ref 0–100)
LDLC/HDLC SERPL: 2.72 {RATIO}
MICROALBUMIN/CREAT UR: NORMAL MG/G{CREAT}
POTASSIUM SERPL-SCNC: 4.9 MMOL/L (ref 3.5–5.2)
PROT SERPL-MCNC: 7.1 G/DL (ref 6–8.5)
SODIUM SERPL-SCNC: 138 MMOL/L (ref 136–145)
TRIGL SERPL-MCNC: 135 MG/DL (ref 0–150)
VLDLC SERPL-MCNC: 24 MG/DL (ref 5–40)

## 2025-04-17 PROCEDURE — 36415 COLL VENOUS BLD VENIPUNCTURE: CPT | Performed by: NURSE PRACTITIONER

## 2025-04-17 PROCEDURE — 83036 HEMOGLOBIN GLYCOSYLATED A1C: CPT | Performed by: NURSE PRACTITIONER

## 2025-04-17 PROCEDURE — 80061 LIPID PANEL: CPT | Performed by: NURSE PRACTITIONER

## 2025-04-17 PROCEDURE — 80053 COMPREHEN METABOLIC PANEL: CPT | Performed by: NURSE PRACTITIONER

## 2025-04-17 PROCEDURE — 82043 UR ALBUMIN QUANTITATIVE: CPT | Performed by: NURSE PRACTITIONER

## 2025-04-17 PROCEDURE — 82570 ASSAY OF URINE CREATININE: CPT | Performed by: NURSE PRACTITIONER

## 2025-04-24 DIAGNOSIS — I10 BENIGN ESSENTIAL HYPERTENSION: ICD-10-CM

## 2025-04-24 RX ORDER — OLMESARTAN MEDOXOMIL 40 MG/1
40 TABLET ORAL DAILY
Qty: 90 TABLET | Refills: 1 | Status: SHIPPED | OUTPATIENT
Start: 2025-04-24

## 2025-04-24 NOTE — TELEPHONE ENCOUNTER
Rx Refill Note  Requested Prescriptions     Pending Prescriptions Disp Refills    olmesartan (BENICAR) 40 MG tablet [Pharmacy Med Name: OLMESARTAN MEDOXOMIL 40MG TABLETS] 90 tablet 1     Sig: TAKE 1 TABLET BY MOUTH DAILY      Last office visit with prescribing clinician: 4/14/2025   Last telemedicine visit with prescribing clinician: Visit date not found   Next office visit with prescribing clinician: 5/1/2025                         Would you like a call back once the refill request has been completed: [] Yes [] No    If the office needs to give you a call back, can they leave a voicemail: [] Yes [] No    Ibrahima Hughes MA  04/24/25, 10:02 EDT

## 2025-04-28 ENCOUNTER — OFFICE VISIT (OUTPATIENT)
Dept: ORTHOPEDIC SURGERY | Facility: CLINIC | Age: 65
End: 2025-04-28
Payer: MEDICARE

## 2025-04-28 VITALS — TEMPERATURE: 97.8 F | BODY MASS INDEX: 36.93 KG/M2 | WEIGHT: 208.4 LBS | HEIGHT: 63 IN

## 2025-04-28 DIAGNOSIS — M17.10 ARTHRITIS OF KNEE: Primary | ICD-10-CM

## 2025-04-28 PROCEDURE — 1160F RVW MEDS BY RX/DR IN RCRD: CPT | Performed by: ORTHOPAEDIC SURGERY

## 2025-04-28 PROCEDURE — 1159F MED LIST DOCD IN RCRD: CPT | Performed by: ORTHOPAEDIC SURGERY

## 2025-04-28 PROCEDURE — 20610 DRAIN/INJ JOINT/BURSA W/O US: CPT | Performed by: ORTHOPAEDIC SURGERY

## 2025-04-28 RX ORDER — METHYLPREDNISOLONE ACETATE 80 MG/ML
80 INJECTION, SUSPENSION INTRA-ARTICULAR; INTRALESIONAL; INTRAMUSCULAR; SOFT TISSUE
Status: COMPLETED | OUTPATIENT
Start: 2025-04-28 | End: 2025-04-28

## 2025-04-28 RX ORDER — LIDOCAINE HYDROCHLORIDE 10 MG/ML
2 INJECTION, SOLUTION EPIDURAL; INFILTRATION; INTRACAUDAL; PERINEURAL
Status: COMPLETED | OUTPATIENT
Start: 2025-04-28 | End: 2025-04-28

## 2025-04-28 RX ADMIN — METHYLPREDNISOLONE ACETATE 80 MG: 80 INJECTION, SUSPENSION INTRA-ARTICULAR; INTRALESIONAL; INTRAMUSCULAR; SOFT TISSUE at 09:42

## 2025-04-28 RX ADMIN — LIDOCAINE HYDROCHLORIDE 2 ML: 10 INJECTION, SOLUTION EPIDURAL; INFILTRATION; INTRACAUDAL; PERINEURAL at 09:42

## 2025-04-28 NOTE — PROGRESS NOTES
Alisha comes in today to get the injection.  She is headed to [place] and [place] this weekend and is hoping to get some relief.  The risk, benefits and alternatives to the injection were discussed.  She consented and procedure was performed as described below.  I wished her well.  She will follow-up as needed.        Large Joint Arthrocentesis: R knee  Date/Time: 4/28/2025 9:42 AM  Consent given by: patient  Site marked: site marked  Timeout: Immediately prior to procedure a time out was called to verify the correct patient, procedure, equipment, support staff and site/side marked as required   Supporting Documentation  Indications: pain   Procedure Details  Location: knee - R knee  Preparation: Patient was prepped and draped in the usual sterile fashion  Needle gauge: 21 G.  Approach: anterolateral  Medications administered: 2 mL lidocaine PF 1% 1 %; 80 mg methylPREDNISolone acetate 80 MG/ML  Patient tolerance: patient tolerated the procedure well with no immediate complications

## 2025-05-01 ENCOUNTER — TELEMEDICINE (OUTPATIENT)
Dept: FAMILY MEDICINE CLINIC | Facility: CLINIC | Age: 65
End: 2025-05-01
Payer: MEDICARE

## 2025-05-01 DIAGNOSIS — E78.00 PURE HYPERCHOLESTEROLEMIA: ICD-10-CM

## 2025-05-01 DIAGNOSIS — E11.9 TYPE 2 DIABETES MELLITUS WITHOUT COMPLICATION, WITHOUT LONG-TERM CURRENT USE OF INSULIN: ICD-10-CM

## 2025-05-01 DIAGNOSIS — R74.8 ELEVATED LIVER ENZYMES: Primary | ICD-10-CM

## 2025-05-01 NOTE — PROGRESS NOTES
"Chief Complaint  Elevated Hepatic Enzymes    Subjective        Alisha Connolly presents to [facility]  History of Present Illness    History of Present Illness  Patient scheduled a televisit at the behest of me to discuss recently elevated LFTs.  They were mildly elevated but they have not been previously elevated in the past.  She did not exercise prior to labs as she has had chronic pain and using cane at the moment.  She did recently just changed her diet and is now using HRM diet and has lost approximately 12 pounds since starting.  She does have some trepidation about how healthy the food actually is.    She has been monitoring her blood sugar which she thinks still runs high especially in the mornings when she is not eating much at all.  She is on metformin and would like more information on how it actually works.  Really does not like to take medication unless absolutely necessary.  Blood sugars have run as low as 70 in the morning which does make her feel like she should eat something.  They can be as high as the 180s when she wakes up.  She had previously been eating out quite a bit but now eating at home.  She wants more information on how high her postprandial blood sugars should be and why they are high in the morning.    She has hyperlipidemia and since last visit she has been to cardiology.  We had agreed at last visit she would take Crestor twice a week.  Cardiology gave her a new prescription and patient says since December she has taken maybe 5 doses.  She previously did not tolerate multiple statins in the past.  Her lipids have gone down with diet changes.    She does not take Tylenol on the regular and rarely drinks alcohol.         Objective   Vital Signs:  There were no vitals taken for this visit.  Estimated body mass index is 36.92 kg/m² as calculated from the following:    Height as of 4/28/25: 160 cm (63\").    Weight as of 4/28/25: 94.5 kg (208 lb 6.4 oz).   "             Physical Exam     Physical Exam   Limited by video visit.  She is well appearing and does not seem to be distressed.  She seems alert and oriented and her mood and affect are normal, good historian of medical history.  No cough or dyspnea appreciated, able to complete sentences without problem.       Result Review :            Results                  Assessment and Plan     Diagnoses and all orders for this visit:    1. Elevated liver enzymes (Primary)  -     Comprehensive Metabolic Panel; Future    2. Type 2 diabetes mellitus without complication, without long-term current use of insulin    3. Pure hypercholesterolemia        Assessment & Plan  Mildly elevated liver enzymes is a new problem requiring workup.  Unclear etiology.  May have been due to her dietary changes.  We will recheck them in 1 month.  We discussed DDX and complications due to fatty liver disease.  If still elevated will need possibly a FibroSure and liver ultrasound.  She has been on the estradiol since last year so I think this is unlikely to be causing the suddenly elevated liver enzymes.  Rarely takes Tylenol or hydrocodone.    Type 2 diabetes: Chronic stable.  We discussed diet, carbohydrate and protein management, postprandial goal 2 hours of 160 or less.  We discussed metformin uses and side effects.  A1c 3 months.  She has been at goal of 7 or less over the last 2 years.  No medication changes right now.  I will continue the current dose of metformin.  She will continue to monitor and manage blood sugars.    Hyperlipidemia: Chronic, improved.  We discussed the reason for a statin even with her LDL going down it is still above 100 and she has risk factors with diabetes for cardiovascular disease.  I have asked her to try taking this statin 1 day/week regularly for 4 weeks.  She will add co-Q10.  If she tolerates this well then she will add a second dose later in the week.  We will recheck lipids in 3 months.  She was just given  a new prescription for rosuvastatin as previous was  by cardiology but I am resuming prescriptions for statin.    I reviewed cardiology note 2024 and discussed with patient.          I spent 40 minutes caring for Alisha on this date of service. This time includes time spent by me in the following activities:preparing for the visit, reviewing tests, performing a medically appropriate examination and/or evaluation , counseling and educating the patient/family/caregiver, ordering medications, tests, or procedures, and documenting information in the medical record  Follow Up     Return in about 3 months (around 2025) for Recheck.  Patient was given instructions and counseling regarding her condition or for health maintenance advice. Please see specific information pulled into the AVS if appropriate.

## 2025-05-08 ENCOUNTER — TELEPHONE (OUTPATIENT)
Dept: FAMILY MEDICINE CLINIC | Facility: CLINIC | Age: 65
End: 2025-05-08

## 2025-05-08 NOTE — TELEPHONE ENCOUNTER
Caller: DR JENNIFER MOLINA OFFICE    Relationship:     Best call back number: [phone] OPT 2 THEN OPT 2     What was the call regarding: THE OFFICE IS CALL TO GET THE PATIENT'S INSURANCE INFORMATION

## 2025-05-21 ENCOUNTER — OFFICE VISIT (OUTPATIENT)
Dept: ORTHOPEDIC SURGERY | Facility: CLINIC | Age: 65
End: 2025-05-21
Payer: MEDICARE

## 2025-05-21 VITALS — TEMPERATURE: 98.2 F | HEIGHT: 63 IN | WEIGHT: 211.6 LBS | BODY MASS INDEX: 37.49 KG/M2

## 2025-05-21 DIAGNOSIS — G89.29 CHRONIC LEFT SHOULDER PAIN: Primary | ICD-10-CM

## 2025-05-21 DIAGNOSIS — M17.10 ARTHRITIS OF KNEE: ICD-10-CM

## 2025-05-21 DIAGNOSIS — M25.512 CHRONIC LEFT SHOULDER PAIN: Primary | ICD-10-CM

## 2025-05-21 PROBLEM — M17.11 OSTEOARTHRITIS OF RIGHT KNEE: Status: ACTIVE | Noted: 2025-05-21

## 2025-05-21 RX ORDER — PREGABALIN 150 MG/1
150 CAPSULE ORAL ONCE
OUTPATIENT
Start: 2025-05-21 | End: 2025-05-21

## 2025-05-21 RX ORDER — SEMAGLUTIDE 0.68 MG/ML
INJECTION, SOLUTION SUBCUTANEOUS
COMMUNITY
Start: 2025-05-19

## 2025-05-21 RX ORDER — ACETAMINOPHEN 325 MG/1
1000 TABLET ORAL ONCE
OUTPATIENT
Start: 2025-05-21 | End: 2025-05-21

## 2025-05-21 RX ORDER — EMPAGLIFLOZIN, METFORMIN HYDROCHLORIDE 5; 1000 MG/1; MG/1
1 TABLET, EXTENDED RELEASE ORAL DAILY
COMMUNITY
Start: 2025-05-19 | End: 2026-05-19

## 2025-05-21 NOTE — PROGRESS NOTES
Patient:Alisha Connolly    YOB: 1960    Medical Record Number:[id number]    Chief Complaints:  New complaint of left shoulder pain, follow up right knee pain    History of Present Illness:     65 y.o. female patient who presents for multiple issues.  The first is her right knee.  We did an injection last time I saw her.  She says it helped transiently.  She was able to get through [place] and [place] but then her pain came right back.  This has been a longstanding issue for her dating back many years.  She was told by Dr. Rasmussen even prior to her left knee replacement that she was going to need a right knee replacement.  She has been trying to put it off as long as possible.  She feels like it is time to move forward with the replacement.  Her current pain is severe and constant.  She has tried activity modifications, anti-inflammatories, physical therapy and multiple injections with little relief of her symptoms.  She feels like it is time to move forward with the replacement.    She also mentions a new issue of left shoulder pain.  She says it started about a year ago but then it got worse in March when she was pulling herself into a car.  She felt something pop in the shoulder.  Ever since then, she reports sharp stabbing pains with certain reaching and lifting movements.  She does report some night pain and difficulty sleeping as well.  Localizes her pain to the lateral and anterolateral aspect of the shoulder.  She has noticed some weakness.  Denies numbness or tingling.    Allergies   Allergen Reactions    Penicillins Hives    Atorvastatin Paresthesia     Arm numbness            Rosuvastatin Paresthesia     Arm numbness      Latex Rash    Tetanus Toxoid, Adsorbed Rash       Current Outpatient Medications:     acetaminophen (TYLENOL) 500 MG tablet, Take 1 tablet by mouth Every 6 (Six) Hours As Needed for Mild Pain. Indications: Pain, Disp: , Rfl:     albuterol sulfate  (90 Base) MCG/ACT inhaler,  Inhale 2 puffs Every 4 (Four) Hours As Needed. Indications: Asthma (Patient taking differently: Inhale 2 puffs Every 4 (Four) Hours As Needed for Shortness of Air. Indications: Asthma), Disp: , Rfl:     amLODIPine (NORVASC) 5 MG tablet, TAKE 1 TABLET BY MOUTH DAILY, Disp: 90 tablet, Rfl: 1    cloNIDine (CATAPRES) 0.1 MG tablet, Take 1 tablet by mouth 2 (Two) Times a Day As Needed for High Blood Pressure (Systolic BP > 180, Diastolic BP > 110). (Patient taking differently: Take 1 tablet by mouth 2 (Two) Times a Day As Needed for High Blood Pressure (SBP > 180, DBP > 110).), Disp: 15 tablet, Rfl: 0    estradiol (MINIVELLE, VIVELLE-DOT) 0.05 MG/24HR patch, 1 patch 2 (Two) Times a Week., Disp: , Rfl:     estradiol (VAGIFEM) 10 MCG tablet vaginal tablet, INSERT 1 TABLET INTRAVAGINALLY 2 TIMES A WEEK, Disp: , Rfl:     HYDROcodone-acetaminophen (NORCO) 5-325 MG per tablet, Take 1 tablet by mouth Every 6 (Six) Hours As Needed., Disp: , Rfl:     Lancets (OneTouch Delica Plus [id number]) misc, 1 each by Other route Daily., Disp: 100 each, Rfl: 6    metFORMIN (GLUCOPHAGE) 500 MG tablet, TAKE 1 TABLET BY MOUTH TWICE DAILY WITH MEALS, Disp: 180 tablet, Rfl: 0    olmesartan (BENICAR) 40 MG tablet, TAKE 1 TABLET BY MOUTH DAILY, Disp: 90 tablet, Rfl: 1    OneTouch Ultra test strip, 1 each by Other route Daily., Disp: 400 each, Rfl: 1    rosuvastatin (CRESTOR) 5 MG tablet, TAKE 1 TABLET BY MOUTH DAILY, Disp: 90 tablet, Rfl: 0    Semaglutide,0.25 or 0.5MG/DOS, (Ozempic, 0.25 or 0.5 MG/DOSE,) 2 MG/3ML solution pen-injector, Inject 0.25 mg sq q week x 4 weeks and then increase 0.5 mg sq q week, Disp: , Rfl:     Synjardy XR 5-1000 MG tablet sustained-release 24 hour, Take 1 tablet by mouth Daily., Disp: , Rfl:     Past Medical History:   Diagnosis Date    Abnormal Pap smear of cervix     Age-related nuclear cataract of both eyes 02/03/2020    Anal fissure 10/2021    Anal skin tag 08/2022    S/P EXCISION    Ankle fracture 12/2022    RIGHT     Arthrodesis status 03/08/2014    Asthma     Benign essential hypertension 06/05/2015    Continue lisinopril    Carpal tunnel syndrome     PING    Cervical disc disorder 3/94    Titanium plate    Cervical spondylolysis     Chronic neck pain 07/19/2016    Closed nondisplaced fracture of lateral malleolus of right fibula 12/10/2022    NO SURGERY, SEEN AT [facility]    Cystitis 11/2017    Dry eye syndrome 02/03/2020    Environmental allergies     Exogenous hypertriglyceridemia 06/05/2017    Ganglion cyst of finger 01/2020    Hemorrhoids     History of COVID-19 05/2022    History of torn meniscus of left knee     Hormone replacement therapy (HRT)     Hyperchylomicronemia     Hyperlipidemia 06/05/2015    ASCVD 10-year risk ~10% Plan: · Start statin. Unclear whether arm numbness really 2/2 statin in the past.    Hyperopia of both eyes 02/03/2020    Keratoconjunctivitis sicca 02/03/2020    Knee pain, left     Knee swelling 4/2025    Lichen sclerosus     Lung nodule 06/01/2006    Obstructive sleep apnea syndrome 11/01/2004    Not using CPAP due to eye issues    RAMU (obstructive sleep apnea) 03/04/2024    Home sleep study.  Weight 207 pounds.  Mild RAMU with AHI 8.2 events per hour.  No sleep-related hypoxia.  The patient snored 55% of total monitoring time.    Palpitations     Pertussis 02/08/2016    [facility]    PNA (pneumonia)     PONV (postoperative nausea and vomiting)     Presbyopia 02/03/2020    Rotator cuff injury 12/04/2011    SEEN AT [facility]    Seasonal allergies     Slow to wake up after anesthesia     Snoring     Tear of meniscus of knee 12/22    Left    Type 2 diabetes mellitus without complication 12/21/2015    Under good control Plan: · Reassured about use of metformin    Urinary urgency 06/2021       Past Surgical History:   Procedure Laterality Date    ANKLE OPEN REDUCTION INTERNAL FIXATION  12/94    ANTERIOR CERVICAL DISCECTOMY W/ FUSION N/A 03/08/2014    C6-7, DR. ALEXEY IZAGUIRRE AT [facility]. TITANIUM PLATE     ANUS SURGERY N/A 2022    Procedure: ANAL TAG EXCISION;  Surgeon: Bradley Santiago MD;  [facility] END[facility];  Service: General;  Laterality: N/A;  pre-anal tag  post-same    CARPAL TUNNEL RELEASE Bilateral      SECTION          COLONOSCOPY N/A     COLONOSCOPY N/A 2022    EXTERNAL AND INTERNAL HEMORRHOIDS, GRADE 3-BANDED, RESCOPE IN 10 YRS, DR. BRADLEY SANTIAGO AT [facility]    HAND SURGERY      Carpal tunnel    HEMORRHOIDECTOMY N/A 2022    Procedure: HEMORRHOID BANDING x3;  Surgeon: Bradley Santiago MD;  Location: [facility] ENDOSCOPY;  Service: General;  Laterality: N/A;  pre-hemorrhoids  post-same    HERNIA REPAIR      HYSTERECTOMY N/A     WITH BSO, FOR HEAVY BLEEDING    INCISIONAL BREAST BIOPSY Right     BENIGN    JOINT REPLACEMENT      Knee left    LAPAROSCOPIC LYSIS OF ADHESIONS N/A     NECK SURGERY      OOPHORECTOMY      OOPHORECTOMY      SPHINCTEROTOMY N/A 2023    Procedure: EXAM UNDER ANESTHESIA, CHEMICAL SPHINCTEROTOMY WITH BOTOX;  Surgeon: Bradley Santiago MD;  Location: [facility] MAIN OR;  Service: General;  Laterality: N/A;    SPHINCTEROTOMY N/A 2023    Procedure: chemical sphincterotomy with Botox and hemorrhoidectomy;  Surgeon: Bradley Santiago MD;  Location: [facility] MAIN OR;  Service: General;  Laterality: N/A;    TONSILLECTOMY      TOTAL KNEE ARTHROPLASTY Left 2023    Procedure: LEFT TOTAL KNEE ARTHROPLASTY WITH JACE NAVIGATION;  Surgeon: Tomás Rasmussen MD;  Location: [facility] OR [facility] [place];  Service: Orthopedics;  Laterality: Left;       Social History     Occupational History    Not on file   Tobacco Use    Smoking status: Former     Current packs/day: 0.00     Average packs/day: 0.3 packs/day for 5.9 years (1.5 ttl pk-yrs)     Types: Cigarettes     Start date: 2009     Quit date: 3/7/2014     Years since quittin.2     Passive exposure: Past    Smokeless tobacco: Never   Vaping Use    Vaping status: Never Used   Substance and Sexual  "Activity    Alcohol use: Yes     Alcohol/week: 1.0 standard drink of alcohol     Types: 1 Drinks containing 0.5 oz of alcohol per week     Comment: Social    Drug use: Never    Sexual activity: Yes     Partners: Male     Birth control/protection: Post-menopausal, Hysterectomy      Social History     Social History Narrative    Not on file       Family History   Problem Relation Age of Onset    Arthritis Mother     Heart disease Mother     Arrhythmia Mother     Hypertension Father     Heart attack Father     Hyperlipidemia Father     Diabetes Father     Heart disease Father     Coronary artery disease Father     Diabetes Sister     Diabetes Paternal Grandmother     Cancer Cousin     Breast cancer Cousin     Cancer Maternal Great-Grandmother     Ovarian cancer Maternal Great-Grandmother     Malig Hyperthermia Neg Hx        Review of Systems:      Constitutional: Denies fever, shaking or chills   Eyes: Denies change in visual acuity   HEENT: Denies nasal congestion or sore throat   Respiratory: Denies cough or shortness of breath   Cardiovascular: Denies chest pain or edema  Endocrine: Denies tremors, palpitations, intolerance of heat or cold, polyuria, polydipsia.  GI: Denies abdominal pain, nausea, vomiting, bloody stools or diarrhea  : Denies frequency, urgency, incontinence, retention, or nocturia.  Musculoskeletal: Denies numbness, tingling or loss of motor function except as above  Integument: Denies rash, lesion or ulceration   Neurologic: Denies headache or focal weakness, deficits  Heme: Denies spontaneous or excessive bleeding, epistaxis, hematuria, melena, fatigue, enlarged or tender lymph nodes.      All other pertinent positives and negatives as noted above in HPI.    Physical Exam:65 y.o. female  Vitals:    05/21/25 1303   Temp: 98.2 °F (36.8 °C)   TempSrc: Temporal   Weight: 96 kg (211 lb 9.6 oz)   Height: 160 cm (63\")     General:  Patient is awake and alert.  Appears in no acute distress or " discomfort.    Psych:  Affect and demeanor are appropriate.    Extremities: Right knee: Skin is benign.  Moderate medial and mild lateral joint line tenderness.  Trace effusion.  She can fully extend the knee and flex to about 115.  No instability.    Left upper extremity:  Skin is benign.  No gross abnormalities on inspection.  Mild anterior tenderness.  Full shoulder motion.  Mildly positive Neer, Canales maneuvers.  Weakness with forward elevation in the scapular plane, 5-/5.   Good strength with internal and external rotation.  Good strength in the deltoid, biceps, triceps, and .  Intact sensation throughout the arm.  Brisk cap refill.  Palpable radial pulse.  Good skin turgor.     Imaging: Outside x-rays left shoulder from November 2024 are reviewed on the [facility] system along with radiology report.  I have independently interpreted the images.  No acute abnormalities.  No significant degenerative changes or other concerning findings.    I did go back and review her previous right knee x-rays as well as the MRI.  The x-rays look okay but her MRI confirms advanced tricompartment osteoarthritis with a complex medial meniscal tear and partial meniscal extrusion.    Assessment/Plan:  1.  Right knee osteoarthritis with meniscal tear  2.  Left shoulder pain and weakness, suspected rotator cuff tear    We had a long discussion about both problems and the options.  She says the knee is the more symptomatic issue for her and she would prefer to address that first.  We talked about her options including continued treatment versus arthroscopy versus arthroplasty.  I think arthroscopy is unlikely to provide her any significant long-term benefit.  Ultimately she is going to need the replacement.  She feels like the conservative treatments are not working.  The shot helped only transiently.  She wants to move forward with the replacement.  The risk, benefits and alternatives were discussed.  She consents to proceed.  I  will have my  contact her about setting this up.  She will follow-up with either myself or Padmini for a preoperative visit.    As far as the shoulder goes, I recommend we defer further workup until after we address the knee.  I think it might help to try an injection to relieve her pain.  We talked about doing that now versus at the time of her knee surgery.  She would prefer to do the shot at the time of the surgery.  I told her I think that is perfectly fine.    Medhat Cantu MD    05/21/2025

## 2025-05-23 ENCOUNTER — LAB (OUTPATIENT)
Dept: LAB | Facility: HOSPITAL | Age: 65
End: 2025-05-23
Payer: MEDICARE

## 2025-05-23 DIAGNOSIS — R74.8 ELEVATED LIVER ENZYMES: ICD-10-CM

## 2025-05-23 LAB
ALBUMIN SERPL-MCNC: 4.4 G/DL (ref 3.5–5.2)
ALBUMIN/GLOB SERPL: 1.5 G/DL
ALP SERPL-CCNC: 46 U/L (ref 39–117)
ALT SERPL W P-5'-P-CCNC: 39 U/L (ref 1–33)
ANION GAP SERPL CALCULATED.3IONS-SCNC: 8.8 MMOL/L (ref 5–15)
AST SERPL-CCNC: 38 U/L (ref 1–32)
BILIRUB SERPL-MCNC: 0.8 MG/DL (ref 0–1.2)
BUN SERPL-MCNC: 13 MG/DL (ref 8–23)
BUN/CREAT SERPL: 12.4 (ref 7–25)
CALCIUM SPEC-SCNC: 9.7 MG/DL (ref 8.6–10.5)
CHLORIDE SERPL-SCNC: 104 MMOL/L (ref 98–107)
CO2 SERPL-SCNC: 26.2 MMOL/L (ref 22–29)
CREAT SERPL-MCNC: 1.05 MG/DL (ref 0.57–1)
EGFRCR SERPLBLD CKD-EPI 2021: 58.4 ML/MIN/1.73
GLOBULIN UR ELPH-MCNC: 3 GM/DL
GLUCOSE SERPL-MCNC: 116 MG/DL (ref 65–99)
POTASSIUM SERPL-SCNC: 4.6 MMOL/L (ref 3.5–5.2)
PROT SERPL-MCNC: 7.4 G/DL (ref 6–8.5)
SODIUM SERPL-SCNC: 139 MMOL/L (ref 136–145)

## 2025-05-23 PROCEDURE — 36415 COLL VENOUS BLD VENIPUNCTURE: CPT

## 2025-05-23 PROCEDURE — 80053 COMPREHEN METABOLIC PANEL: CPT

## 2025-05-28 ENCOUNTER — TELEPHONE (OUTPATIENT)
Dept: ORTHOPEDIC SURGERY | Facility: CLINIC | Age: 65
End: 2025-05-28
Payer: MEDICARE

## 2025-05-28 RX ORDER — TRAMADOL HYDROCHLORIDE 50 MG/1
50 TABLET ORAL EVERY 4 HOURS PRN
Qty: 30 TABLET | Refills: 0 | Status: SHIPPED | OUTPATIENT
Start: 2025-05-28

## 2025-05-28 NOTE — TELEPHONE ENCOUNTER
She would like to schedule in July after a family vacation, but wanted to know if you could send in anything for her pain in the meantime. She also asked how many PT visits she will have.

## 2025-06-05 ENCOUNTER — TELEPHONE (OUTPATIENT)
Dept: FAMILY MEDICINE CLINIC | Facility: CLINIC | Age: 65
End: 2025-06-05
Payer: MEDICARE

## 2025-06-05 ENCOUNTER — TELEPHONE (OUTPATIENT)
Dept: ORTHOPEDIC SURGERY | Facility: CLINIC | Age: 65
End: 2025-06-05

## 2025-06-05 NOTE — TELEPHONE ENCOUNTER
Provider: DR LIMON     Caller: DIVINA    Relationship to Patient: DR STELLA TONG'S OFFICE     Phone Number: [phone]    Reason for Call: PATIENT IS HAVING SURGERY ON 7-24-25 AND FISH TONG SAYS TO HOLD OZEMPIC ONE WEEK PRIOR TO SURGERY.

## 2025-06-05 NOTE — TELEPHONE ENCOUNTER
Spoke with Marisel with [facility] and noted for PT to hold ozempic 1 week prior to her surgery on 7/24/25 by Dr Rowan

## 2025-06-09 DIAGNOSIS — R79.89 ELEVATED SERUM CREATININE: ICD-10-CM

## 2025-06-09 DIAGNOSIS — R79.89 ELEVATED LFTS: Primary | ICD-10-CM

## 2025-06-13 ENCOUNTER — TELEPHONE (OUTPATIENT)
Dept: CARDIOLOGY | Age: 65
End: 2025-06-13
Payer: MEDICARE

## 2025-06-13 NOTE — TELEPHONE ENCOUNTER
I saw her in December 2024 and she was doing quite well.  As long as she has no new symptoms, would be fine to proceed as she had negative stress test in 2023.

## 2025-06-13 NOTE — TELEPHONE ENCOUNTER
Received a cardiac clearance request from [facility] re: TKR scheduled for 7/24/2025.  Pt is not on any AC.  Is this ok to proceed or should she be seen first.      Last Cardiac Eval: 6/2023

## 2025-06-13 NOTE — TELEPHONE ENCOUNTER
Spoke with pt.      No palpitations  No SOA  No chest pain    KANWAL Solis signed clearance and this was scanned and faxed.  (Done)

## 2025-06-27 ENCOUNTER — TELEPHONE (OUTPATIENT)
Dept: ORTHOPEDIC SURGERY | Facility: CLINIC | Age: 65
End: 2025-06-27
Payer: MEDICARE

## 2025-06-27 NOTE — TELEPHONE ENCOUNTER
She would like to know if she could stay for observation after her surgery on 7/24 due to being diabetic. She also said you all had talked about an MRI on her left shoulder and wanted to know if you wanted her to have this before or after her surgery on her knee. I told her I'm sure the overnight stay would not be a problem, but I would find out about the MRI and let her know.

## 2025-07-01 ENCOUNTER — LAB (OUTPATIENT)
Dept: LAB | Facility: HOSPITAL | Age: 65
End: 2025-07-01
Payer: MEDICARE

## 2025-07-01 LAB
ALBUMIN SERPL-MCNC: 4.3 G/DL (ref 3.5–5.2)
ALBUMIN/GLOB SERPL: 1.5 G/DL
ALP SERPL-CCNC: 40 U/L (ref 39–117)
ALT SERPL W P-5'-P-CCNC: 27 U/L (ref 1–33)
ANION GAP SERPL CALCULATED.3IONS-SCNC: 11.5 MMOL/L (ref 5–15)
AST SERPL-CCNC: 24 U/L (ref 1–32)
BILIRUB SERPL-MCNC: 0.7 MG/DL (ref 0–1.2)
BUN SERPL-MCNC: 17.8 MG/DL (ref 8–23)
BUN/CREAT SERPL: 17.3 (ref 7–25)
CALCIUM SPEC-SCNC: 9.8 MG/DL (ref 8.6–10.5)
CHLORIDE SERPL-SCNC: 105 MMOL/L (ref 98–107)
CO2 SERPL-SCNC: 21.5 MMOL/L (ref 22–29)
CREAT SERPL-MCNC: 1.03 MG/DL (ref 0.57–1)
EGFRCR SERPLBLD CKD-EPI 2021: 59.7 ML/MIN/1.73
GLOBULIN UR ELPH-MCNC: 2.8 GM/DL
GLUCOSE SERPL-MCNC: 100 MG/DL (ref 65–99)
POTASSIUM SERPL-SCNC: 4.7 MMOL/L (ref 3.5–5.2)
PROT SERPL-MCNC: 7.1 G/DL (ref 6–8.5)
SODIUM SERPL-SCNC: 138 MMOL/L (ref 136–145)

## 2025-07-01 PROCEDURE — 80053 COMPREHEN METABOLIC PANEL: CPT | Performed by: NURSE PRACTITIONER

## 2025-07-08 ENCOUNTER — PRE-ADMISSION TESTING (OUTPATIENT)
Dept: PREADMISSION TESTING | Facility: HOSPITAL | Age: 65
End: 2025-07-08
Payer: MEDICARE

## 2025-07-08 VITALS
SYSTOLIC BLOOD PRESSURE: 112 MMHG | RESPIRATION RATE: 16 BRPM | HEART RATE: 82 BPM | BODY MASS INDEX: 36.38 KG/M2 | TEMPERATURE: 98 F | WEIGHT: 205.3 LBS | HEIGHT: 63 IN | DIASTOLIC BLOOD PRESSURE: 70 MMHG | OXYGEN SATURATION: 98 %

## 2025-07-08 LAB
DEPRECATED RDW RBC AUTO: 44.8 FL (ref 37–54)
ERYTHROCYTE [DISTWIDTH] IN BLOOD BY AUTOMATED COUNT: 13.8 % (ref 12.3–15.4)
HCT VFR BLD AUTO: 39.1 % (ref 34–46.6)
HGB BLD-MCNC: 12.8 G/DL (ref 12–15.9)
MCH RBC QN AUTO: 29.4 PG (ref 26.6–33)
MCHC RBC AUTO-ENTMCNC: 32.7 G/DL (ref 31.5–35.7)
MCV RBC AUTO: 89.7 FL (ref 79–97)
PLATELET # BLD AUTO: 348 10*3/MM3 (ref 140–450)
PMV BLD AUTO: 10.4 FL (ref 6–12)
QT INTERVAL: 377 MS
QTC INTERVAL: 402 MS
RBC # BLD AUTO: 4.36 10*6/MM3 (ref 3.77–5.28)
WBC NRBC COR # BLD AUTO: 6.27 10*3/MM3 (ref 3.4–10.8)

## 2025-07-08 PROCEDURE — 85027 COMPLETE CBC AUTOMATED: CPT

## 2025-07-08 PROCEDURE — 93005 ELECTROCARDIOGRAM TRACING: CPT

## 2025-07-08 PROCEDURE — 36415 COLL VENOUS BLD VENIPUNCTURE: CPT

## 2025-07-08 NOTE — DISCHARGE INSTRUCTIONS
Take the following medications the morning of surgery:    BRING IN INHALER    If you are on an Aspirin or a Blood Thinner please clarify with the surgeon and prescribing physician if and when you are to hold the medication or if you are to continue the medication.  If you are on prescription narcotic pain medication to control your pain you may also take that medication the morning of surgery.      General Instructions:     Do not eat solid food after midnight the night before surgery.  Clear liquids day of surgery are allowed but must be stopped at least two hours before your hospital arrival time.       Allowed clear liquids      Water, sodas, and tea or coffee with no cream or milk added.       12 to 20 ounces of a clear liquid that contains carbohydrates is recommended.   If diabetic, have G2 or Powerade Zero.     Do not have liquids red in color.  Do not consume chicken, beef, pork or vegetable broth or bouillon cubes of any variety as they are not considered clear liquids and are not allowed.        Patients who avoid smoking, chewing tobacco and alcohol for 4 weeks prior to surgery have a reduced risk of post-operative complications.   Do not smoke, use chewing tobacco or drink alcohol the day of surgery.   If applicable bring your C-PAP   machine in with you to preop day of surgery.  Bring any papers given to you in the doctor’s office.  Wear clean comfortable clothes.  Do not wear contact lenses, false eyelashes or make-up.  Bring a case for your glasses.   Bring  walker if applicable.  Remove all piercings.  Leave jewelry and any other valuables at home.  Hair extensions with metal clips must be removed prior to surgery.  The Pre-Admission Testing nurse will instruct you to bring medications if unable to obtain an accurate list in Pre-Admission Testing.    Day of surgery you will need to let the preoperative nurse know the last time you took each of your medications.  To ensure a safe environment for  patients and staff, we kindly ask that children under the age of 16 not accompany patients.  If you must bring a dependent child or dependent adult please ensure a responsible adult, other than yourself, is present to supervise them.          Preventing a Surgical Site Infection:  For 2 to 3 days before surgery, avoid shaving with a razor because the razor can irritate skin and make it easier to develop an infection.    Any areas of open skin can increase the risk of a post-operative wound infection by allowing bacteria to enter and travel throughout the body.  Notify your surgeon if you have any skin wounds / rashes even if it is not near the expected surgical site.  The area will need assessed to determine if surgery should be delayed until it is healed.  The night prior to surgery shower using a fresh bar of anti-bacterial soap (such as Dial) and clean washcloth.  Sleep in a clean bed with clean clothing.  Do not allow pets to sleep with you.  Shower on the morning of surgery using a fresh bar of anti-bacterial soap (such as Dial) and clean washcloth.  Dry with a clean towel and dress in clean clothing.  Ask your surgeon if you will be receiving antibiotics prior to surgery.  Make sure you, your family, and all healthcare providers clean their hands with soap and water or an alcohol based hand  before caring for you or your wound.    Day of surgery:  Your arrival time is approximately two hours before your scheduled surgery time.  Please note if you have an early arrival time the surgery doors do not open before 5:00 AM.  Upon arrival, a Pre-op nurse and Anesthesiologist will review your health history, obtain vital signs, and answer questions you may have.  The only belongings needed at this time will be a list of your home medications and if applicable your C-PAP/BI-PAP machine.  A Pre-op nurse will start an IV and you may receive medication in preparation for surgery, including something to help you  relax.     Please be aware that surgery does come with discomfort.  We want to make every effort to control your discomfort so please discuss any uncontrolled symptoms with your nurse.   Your doctor will most likely have prescribed pain medications.      CHLORHEXIDINE CLOTH INSTRUCTIONS  The morning of surgery follow these instructions using the Chlorhexidine cloths you've been given.  These steps reduce bacteria on the body.  Do not use the cloths near your eyes, ears mouth, genitalia or on open wounds.  Throw the cloths away after use but do not try to flush them down a toilet.      Open and remove one cloth at a time from the package.    Leave the cloth unfolded and begin the bathing.  Massage the skin with the cloths using gentle pressure to remove bacteria.  Do not scrub harshly.   Follow the steps below with one 2% CHG cloth per area (6 total cloths).  One cloth for neck, shoulders and chest.  One cloth for both arms, hands, fingers and underarms (do underarms last).  One cloth for the abdomen followed by groin.  One cloth for right leg and foot including between the toes.  One cloth for left leg and foot including between the toes.  The last cloth is to be used for the back of the neck, back and buttocks.    Allow the CHG to air dry 3 minutes on the skin which will give it time to work and decrease the chance of irritation.  The skin may feel sticky until it is dry.  Do not rinse with water or any other liquid or you will lose the beneficial effects of the CHG.  If mild skin irritation occurs, do rinse the skin to remove the CHG.  Report this to the nurse at time of admission.  Do not apply lotions, creams, ointments, deodorants or perfumes after using the clothes. Dress in clean clothes before coming to the hospital.    If you are staying overnight following surgery, you will be transported to your hospital room following the recovery period.  [facility] has all private rooms.    If you have  any questions please call Pre-Admission Testing at [phone].  Deductibles and co-payments are collected on the day of service. Please be prepared to pay the required co-pay, deductible or deposit on the day of service as defined by your plan.    Call your surgeon immediately if you experience any of the following symptoms:  Sore Throat  Shortness of Breath or difficulty breathing  Cough  Chills  Body soreness or muscle pain  Headache  Fever  New loss of taste or smell  Do not arrive for your surgery ill.  Your procedure will need to be rescheduled to another time.  You will need to call your physician before the day of surgery to avoid any unnecessary exposure to hospital staff as well as other patients.

## 2025-07-11 ENCOUNTER — OFFICE VISIT (OUTPATIENT)
Dept: ORTHOPEDIC SURGERY | Facility: CLINIC | Age: 65
End: 2025-07-11
Payer: MEDICARE

## 2025-07-11 VITALS — HEIGHT: 63 IN | BODY MASS INDEX: 36.04 KG/M2 | WEIGHT: 203.4 LBS | TEMPERATURE: 97.3 F

## 2025-07-11 DIAGNOSIS — Z01.818 PREOP EXAMINATION: Primary | ICD-10-CM

## 2025-07-11 NOTE — H&P (VIEW-ONLY)
History & Physical       Patient: Alisha Connolly    YOB: 1960    Medical Record Number: [id number]    Chief Complaints: Right knee endstage osteoarthritis    History of Present Illness: 65 y.o. female presents today in anticipation of upcoming knee replacement surgery.  Patient has a long history of worsening symptoms.  Describes the pain as severe, constant, and typically dull and achy. She has tried and failed prolonged conservative treatment. She is struggling with routine daily activities.  This has become a significant issue for overall quality of life. She cannot walk any prolonged distances without having to rest.     Allergies:   Allergies   Allergen Reactions    Penicillins Hives    Atorvastatin Paresthesia     Arm numbness            Rosuvastatin Paresthesia     Arm numbness      Latex Rash    Tetanus Toxoid, Adsorbed Rash       Medications:   Home Medications:    Current Outpatient Medications:     acetaminophen (TYLENOL) 500 MG tablet, Take 1 tablet by mouth Every 6 (Six) Hours As Needed for Mild Pain. Indications: Pain, Disp: , Rfl:     albuterol sulfate  (90 Base) MCG/ACT inhaler, Inhale 2 puffs Every 4 (Four) Hours As Needed. Indications: Asthma (Patient taking differently: Inhale 2 puffs Every 4 (Four) Hours As Needed for Shortness of Air. Indications: Asthma), Disp: , Rfl:     amLODIPine (NORVASC) 5 MG tablet, TAKE 1 TABLET BY MOUTH DAILY (Patient taking differently: Take 1 tablet by mouth Every Evening.), Disp: 90 tablet, Rfl: 1    cloNIDine (CATAPRES) 0.1 MG tablet, Take 1 tablet by mouth 2 (Two) Times a Day As Needed for High Blood Pressure (Systolic BP > 180, Diastolic BP > 110). (Patient taking differently: Take 1 tablet by mouth 2 (Two) Times a Day As Needed for High Blood Pressure (SBP > 180, DBP > 110).), Disp: 15 tablet, Rfl: 0    estradiol (MINIVELLE, VIVELLE-DOT) 0.05 MG/24HR patch, 1 patch 2 (Two) Times a Week., Disp: , Rfl:     estradiol (VAGIFEM) 10 MCG tablet  vaginal tablet, 1 tablet 2 (Two) Times a Week., Disp: , Rfl:     Lancets (OneTouch Delica Plus [id number]) misc, 1 each by Other route Daily., Disp: 100 each, Rfl: 6    olmesartan (BENICAR) 40 MG tablet, TAKE 1 TABLET BY MOUTH DAILY (Patient taking differently: Take 1 tablet by mouth Daily. DO NOT TAKE FOR 24 HOURS BEFORE SURGERY), Disp: 90 tablet, Rfl: 1    OneTouch Ultra test strip, 1 each by Other route Daily., Disp: 400 each, Rfl: 1    rosuvastatin (CRESTOR) 5 MG tablet, TAKE 1 TABLET BY MOUTH DAILY (Patient taking differently: Take 1 tablet by mouth 1 (One) Time Per Week.), Disp: 90 tablet, Rfl: 0    Semaglutide,0.25 or 0.5MG/DOS, (Ozempic, 0.25 or 0.5 MG/DOSE,) 2 MG/3ML solution pen-injector, 0.5 mg 1 (One) Time Per Week. TAKES ON THURSDAY, INSTRUCTED TO STOP 7 DAYS BEFORE SURGERY, Disp: , Rfl:     Synjardy XR 5-1000 MG tablet sustained-release 24 hour, Take 1 tablet by mouth Daily., Disp: , Rfl:     traMADol (ULTRAM) 50 MG tablet, Take 1 tablet by mouth Every 4 (Four) Hours As Needed for Moderate Pain., Disp: 30 tablet, Rfl: 0    Past Medical History:   Diagnosis Date    Abnormal Pap smear of cervix     Age-related nuclear cataract of both eyes 02/03/2020    Anal fissure 10/2021    Anal skin tag 08/2022    S/P EXCISION    Anesthesia     PT WANTS YOU TO KNOW SHE HAS A PLATE IN HER NECK FROM FUSION    Ankle fracture 12/2022    RIGHT    Arthrodesis status 03/08/2014    Asthma     Benign essential hypertension 06/05/2015    Continue lisinopril    Carpal tunnel syndrome     PING    Cervical disc disorder 3/94    Titanium plate    Cervical spondylolysis     Chronic neck pain 07/19/2016    Closed nondisplaced fracture of lateral malleolus of right fibula 12/10/2022    NO SURGERY, SEEN AT [facility]    Cystitis 11/2017    Dry eye syndrome 02/03/2020    Environmental allergies     Exogenous hypertriglyceridemia 06/05/2017    Fracture, foot 12/22    Right    Ganglion cyst of finger 01/2020    Hemorrhoids     History of COVID-19  2022    History of torn meniscus of left knee     Hormone replacement therapy (HRT)     Hyperchylomicronemia     Hyperlipidemia 2015    ASCVD 10-year risk ~10% Plan: · Start statin. Unclear whether arm numbness really 2/2 statin in the past.    Hyperopia of both eyes 2020    Keratoconjunctivitis sicca 2020    Knee pain, left     Knee swelling 2025    Lichen sclerosus     Lung nodule 2006    Obstructive sleep apnea syndrome 2004    HAS C-PAP MACHINE    RAMU (obstructive sleep apnea) 2024    Home sleep study.  Weight 207 pounds.  Mild RAMU with AHI 8.2 events per hour.  No sleep-related hypoxia.  The patient snored 55% of total monitoring time.    Palpitations     Pertussis 2016    [facility]    PNA (pneumonia)     PONV (postoperative nausea and vomiting)     Presbyopia 2020    Rotator cuff injury 2011    SEEN AT [facility] ER    Rotator cuff syndrome     Seasonal allergies     Slow to wake up after anesthesia     Snoring     Tear of meniscus of knee     Left    Type 2 diabetes mellitus without complication 2015    Under good control Plan: · Reassured about use of metformin HAS DEXCOM          Past Surgical History:   Procedure Laterality Date    ANKLE OPEN REDUCTION INTERNAL FIXATION      ANTERIOR CERVICAL DISCECTOMY W/ FUSION N/A 2014    C6-7, DR. ALEXEY IZAGUIRRE AT [facility]. TITANIUM PLATE    ANUS SURGERY N/A 2022    Procedure: ANAL TAG EXCISION;  Surgeon: Sarai Santiago MD;  Location: [facility] ENDOSCOPY;  Service: General;  Laterality: N/A;  pre-anal tag  post-same    CARPAL TUNNEL RELEASE Bilateral      SECTION          COLONOSCOPY N/A     COLONOSCOPY N/A 2022    EXTERNAL AND INTERNAL HEMORRHOIDS, GRADE 3-BANDED, RESCOPE IN 10 YRS, DR. SARAI SANTIAGO AT [facility]    HAND SURGERY      Carpal tunnel    HEMORRHOIDECTOMY N/A 2022    Procedure: HEMORRHOID BANDING x3;  Surgeon: Sarai Santiago MD;  Location:   [facility] ENDOSCOPY;  Service: General;  Laterality: N/A;  pre-hemorrhoids  post-same    HERNIA REPAIR      HYSTERECTOMY N/A     WITH BSO, FOR HEAVY BLEEDING    INCISIONAL BREAST BIOPSY Right     BENIGN    JOINT REPLACEMENT      Knee left    LAPAROSCOPIC LYSIS OF ADHESIONS N/A     NECK SURGERY      OOPHORECTOMY      OOPHORECTOMY      SPHINCTEROTOMY N/A 2023    Procedure: EXAM UNDER ANESTHESIA, CHEMICAL SPHINCTEROTOMY WITH BOTOX;  Surgeon: Sarai Perez MD;  Location: [facility] OR;  Service: General;  Laterality: N/A;    SPHINCTEROTOMY N/A 2023    Procedure: chemical sphincterotomy with Botox and hemorrhoidectomy;  Surgeon: Sarai Perez MD;  Location: [facility] MAIN OR;  Service: General;  Laterality: N/A;    TONSILLECTOMY      TOTAL KNEE ARTHROPLASTY Left 2023    Procedure: LEFT TOTAL KNEE ARTHROPLASTY WITH JACE NAVIGATION;  Surgeon: Tomás Rasmussen MD;  Location: [facility] OR [facility];  Service: Orthopedics;  Laterality: Left;          Social History     Occupational History    Not on file   Tobacco Use    Smoking status: Former     Current packs/day: 0.00     Average packs/day: 0.3 packs/day for 6.8 years (1.8 ttl pk-yrs)     Types: Cigarettes     Start date: 2009     Quit date: 3/7/2014     Years since quittin.3     Passive exposure: Past    Smokeless tobacco: Never   Vaping Use    Vaping status: Never Used   Substance and Sexual Activity    Alcohol use: Yes     Alcohol/week: 1.0 standard drink of alcohol     Types: 1 Drinks containing 0.5 oz of alcohol per week     Comment: Social    Drug use: Never    Sexual activity: Yes     Partners: Male     Birth control/protection: Post-menopausal, Hysterectomy      Social History     Social History Narrative    Not on file          Family History   Problem Relation Age of Onset    Arthritis Mother     Heart disease Mother     Arrhythmia Mother     Hypertension Father     Heart attack Father     Hyperlipidemia Father     Diabetes  "Father     Heart disease Father     Coronary artery disease Father     Diabetes Sister     Diabetes Paternal Grandmother     Cancer Cousin     Breast cancer Cousin     Cancer Maternal Great-Grandmother     Ovarian cancer Maternal Great-Grandmother     Malig Hyperthermia Neg Hx        Review of Systems:  A 14-point review of systems is reviewed with the patient.  Pertinent positives are listed above.  All others are negative.    Physical Exam: 65 y.o. female    Vitals:    07/11/25 1328   Temp: 97.3 °F (36.3 °C)   Weight: 92.3 kg (203 lb 6.4 oz)   Height: 160 cm (63\")       General:  Patient is awake and alert.  Appears in no acute distress or discomfort.    Psych:  Affect and demeanor are appropriate.    Eyes:  Conjunctivae and sclerae; appear grossly normal.  Eyes track well and EOM seems to be intact.    Ears:  No gross abnormalities.  Hearing adequate for the exam.    Cardiovascular:  Regular rate and rhythm.    Lungs:  Good chest expansion.  Breathing unlabored.    Lymph:  No palpable adenopathy about neck or axillae.    Right lower extremity:  Skin benign and intact without evidence for swelling, masses or atrophy.  No palpable masses.  Focal tenderness noted over medial joint line.  ROM is from 0-120°.   Knee is stable on exam.  Good strength throughout the lower leg and foot.  Intact sensation throughout.  Palpable pedal pulses with brisk cap refill.    Diagnostic Tests:  Lab Results   Component Value Date    GLUCOSE 100 (H) 07/01/2025    CALCIUM 9.8 07/01/2025     07/01/2025    K 4.7 07/01/2025    CO2 21.5 (L) 07/01/2025     07/01/2025    BUN 17.8 07/01/2025    CREATININE 1.03 (H) 07/01/2025    EGFRIFAFRI >60 03/31/2021    BCR 17.3 07/01/2025    ANIONGAP 11.5 07/01/2025     Lab Results   Component Value Date    WBC 6.27 07/08/2025    HGB 12.8 07/08/2025    HCT 39.1 07/08/2025    MCV 89.7 07/08/2025     07/08/2025     No results found for: \"INR\", \"PROTIME\"    Imaging:  AP, marat and " "lateral views of the right knee are ordered and reviewed along with a full length alignment x-ray.  These x-rays were taken to evaluate her complaint and presurgical planning.  These compared to previous x-rays.  The x-rays show tricompartment degenerative arthritis with joint space narrowing, osteophyte formation, and subchondral sclerosis.  The majority of the degenerative changes appear to involve the medial compartment.  The leg length alignment x-ray shows fairly neutral alignment.    She previous MRI of the right knee was reviewed and independently interpreted by Dr. Cantu.  The findings are as follows: \"She has a radial tear of the medial meniscus just adjacent to the root.  It does not look like a true root avulsion.  There is partial extrusion of the meniscus.  She has tricompartment osteoarthritis but it appears she has fairly advanced degenerative changes in the medial compartment with subchondral cyst formation in the tibia\".     Assessment:  Right knee osteoarthritis with medial meniscus tear and partial extrusion    Plan: We will plan on proceeding with a right total knee arthroplasty at the patient's request.  I reviewed details of procedure with patient today and discussed all the risks, benefits, alternatives, and limitations of the procedure in laymen's terms with the risks including but not limited to:  neurovascular damage resulting in permanent dysfunction or footdrop and potential need for further surgery, bleeding, infection, hematoma, chronic pain, worsening of pain, persistent symptoms potentially necessitating revision, prosthesis-related problems including loosening or allergy, swelling, loss of motion and arthrofibrosis, weakness, stiffness, instability, DVT, pulmonary embolus, death, stroke, complex regional pain syndrome, and need for additional procedures.  Patient verbalized understanding, and was given the opportunity to ask and have all questions answered today.  No guarantees " were given regarding results of surgery.    Patient is planning for hospital admission following surgery.  Denies history of DVT, PE, or metal allergy.    Padmini Small, APRN  07/11/25

## 2025-07-11 NOTE — PROGRESS NOTES
History & Physical       Patient: Alisha Connolly    YOB: 1960    Medical Record Number: [id number]    Chief Complaints: Right knee endstage osteoarthritis    History of Present Illness: 65 y.o. female presents today in anticipation of upcoming knee replacement surgery.  Patient has a long history of worsening symptoms.  Describes the pain as severe, constant, and typically dull and achy. She has tried and failed prolonged conservative treatment. She is struggling with routine daily activities.  This has become a significant issue for overall quality of life. She cannot walk any prolonged distances without having to rest.     Allergies:   Allergies   Allergen Reactions    Penicillins Hives    Atorvastatin Paresthesia     Arm numbness            Rosuvastatin Paresthesia     Arm numbness      Latex Rash    Tetanus Toxoid, Adsorbed Rash       Medications:   Home Medications:    Current Outpatient Medications:     acetaminophen (TYLENOL) 500 MG tablet, Take 1 tablet by mouth Every 6 (Six) Hours As Needed for Mild Pain. Indications: Pain, Disp: , Rfl:     albuterol sulfate  (90 Base) MCG/ACT inhaler, Inhale 2 puffs Every 4 (Four) Hours As Needed. Indications: Asthma (Patient taking differently: Inhale 2 puffs Every 4 (Four) Hours As Needed for Shortness of Air. Indications: Asthma), Disp: , Rfl:     amLODIPine (NORVASC) 5 MG tablet, TAKE 1 TABLET BY MOUTH DAILY (Patient taking differently: Take 1 tablet by mouth Every Evening.), Disp: 90 tablet, Rfl: 1    cloNIDine (CATAPRES) 0.1 MG tablet, Take 1 tablet by mouth 2 (Two) Times a Day As Needed for High Blood Pressure (Systolic BP > 180, Diastolic BP > 110). (Patient taking differently: Take 1 tablet by mouth 2 (Two) Times a Day As Needed for High Blood Pressure (SBP > 180, DBP > 110).), Disp: 15 tablet, Rfl: 0    estradiol (MINIVELLE, VIVELLE-DOT) 0.05 MG/24HR patch, 1 patch 2 (Two) Times a Week., Disp: , Rfl:     estradiol (VAGIFEM) 10 MCG tablet  vaginal tablet, 1 tablet 2 (Two) Times a Week., Disp: , Rfl:     Lancets (OneTouch Delica Plus [id number]) misc, 1 each by Other route Daily., Disp: 100 each, Rfl: 6    olmesartan (BENICAR) 40 MG tablet, TAKE 1 TABLET BY MOUTH DAILY (Patient taking differently: Take 1 tablet by mouth Daily. DO NOT TAKE FOR 24 HOURS BEFORE SURGERY), Disp: 90 tablet, Rfl: 1    OneTouch Ultra test strip, 1 each by Other route Daily., Disp: 400 each, Rfl: 1    rosuvastatin (CRESTOR) 5 MG tablet, TAKE 1 TABLET BY MOUTH DAILY (Patient taking differently: Take 1 tablet by mouth 1 (One) Time Per Week.), Disp: 90 tablet, Rfl: 0    Semaglutide,0.25 or 0.5MG/DOS, (Ozempic, 0.25 or 0.5 MG/DOSE,) 2 MG/3ML solution pen-injector, 0.5 mg 1 (One) Time Per Week. TAKES ON THURSDAY, INSTRUCTED TO STOP 7 DAYS BEFORE SURGERY, Disp: , Rfl:     Synjardy XR 5-1000 MG tablet sustained-release 24 hour, Take 1 tablet by mouth Daily., Disp: , Rfl:     traMADol (ULTRAM) 50 MG tablet, Take 1 tablet by mouth Every 4 (Four) Hours As Needed for Moderate Pain., Disp: 30 tablet, Rfl: 0    Past Medical History:   Diagnosis Date    Abnormal Pap smear of cervix     Age-related nuclear cataract of both eyes 02/03/2020    Anal fissure 10/2021    Anal skin tag 08/2022    S/P EXCISION    Anesthesia     PT WANTS YOU TO KNOW SHE HAS A PLATE IN HER NECK FROM FUSION    Ankle fracture 12/2022    RIGHT    Arthrodesis status 03/08/2014    Asthma     Benign essential hypertension 06/05/2015    Continue lisinopril    Carpal tunnel syndrome     PING    Cervical disc disorder 3/94    Titanium plate    Cervical spondylolysis     Chronic neck pain 07/19/2016    Closed nondisplaced fracture of lateral malleolus of right fibula 12/10/2022    NO SURGERY, SEEN AT [facility]    Cystitis 11/2017    Dry eye syndrome 02/03/2020    Environmental allergies     Exogenous hypertriglyceridemia 06/05/2017    Fracture, foot 12/22    Right    Ganglion cyst of finger 01/2020    Hemorrhoids     History of COVID-19  2022    History of torn meniscus of left knee     Hormone replacement therapy (HRT)     Hyperchylomicronemia     Hyperlipidemia 2015    ASCVD 10-year risk ~10% Plan: · Start statin. Unclear whether arm numbness really 2/2 statin in the past.    Hyperopia of both eyes 2020    Keratoconjunctivitis sicca 2020    Knee pain, left     Knee swelling 2025    Lichen sclerosus     Lung nodule 2006    Obstructive sleep apnea syndrome 2004    HAS C-PAP MACHINE    RAMU (obstructive sleep apnea) 2024    Home sleep study.  Weight 207 pounds.  Mild RAMU with AHI 8.2 events per hour.  No sleep-related hypoxia.  The patient snored 55% of total monitoring time.    Palpitations     Pertussis 2016    [facility]    PNA (pneumonia)     PONV (postoperative nausea and vomiting)     Presbyopia 2020    Rotator cuff injury 2011    SEEN AT [facility]    Rotator cuff syndrome     Seasonal allergies     Slow to wake up after anesthesia     Snoring     Tear of meniscus of knee     Left    Type 2 diabetes mellitus without complication 2015    Under good control Plan: · Reassured about use of metformin HAS DEXCOM          Past Surgical History:   Procedure Laterality Date    ANKLE OPEN REDUCTION INTERNAL FIXATION      ANTERIOR CERVICAL DISCECTOMY W/ FUSION N/A 2014    C6-7, DR. ALEXEY IZAGUIRRE AT [facility]. TITANIUM PLATE    ANUS SURGERY N/A 2022    Procedure: ANAL TAG EXCISION;  Surgeon: Sarai Santiago MD;  Location: [facility] ENDOSCOPY;  Service: General;  Laterality: N/A;  pre-anal tag  post-same    CARPAL TUNNEL RELEASE Bilateral      SECTION          COLONOSCOPY N/A     COLONOSCOPY N/A 2022    EXTERNAL AND INTERNAL HEMORRHOIDS, GRADE 3-BANDED, RESCOPE IN 10 YRS, DR. SARAI SANTIAGO AT [facility]    HAND SURGERY      Carpal tunnel    HEMORRHOIDECTOMY N/A 2022    Procedure: HEMORRHOID BANDING x3;  Surgeon: Sarai Santiago MD;  Location:   [facility] ENDOSCOPY;  Service: General;  Laterality: N/A;  pre-hemorrhoids  post-same    HERNIA REPAIR      HYSTERECTOMY N/A     WITH BSO, FOR HEAVY BLEEDING    INCISIONAL BREAST BIOPSY Right     BENIGN    JOINT REPLACEMENT      Knee left    LAPAROSCOPIC LYSIS OF ADHESIONS N/A     NECK SURGERY      OOPHORECTOMY      OOPHORECTOMY      SPHINCTEROTOMY N/A 2023    Procedure: EXAM UNDER ANESTHESIA, CHEMICAL SPHINCTEROTOMY WITH BOTOX;  Surgeon: Sarai Perez MD;  Location: [facility] OR;  Service: General;  Laterality: N/A;    SPHINCTEROTOMY N/A 2023    Procedure: chemical sphincterotomy with Botox and hemorrhoidectomy;  Surgeon: Sarai Perez MD;  Location: [facility] MAIN OR;  Service: General;  Laterality: N/A;    TONSILLECTOMY      TOTAL KNEE ARTHROPLASTY Left 2023    Procedure: LEFT TOTAL KNEE ARTHROPLASTY WITH JACE NAVIGATION;  Surgeon: Tomás Rasmussen MD;  Location: [facility] OR [facility];  Service: Orthopedics;  Laterality: Left;          Social History     Occupational History    Not on file   Tobacco Use    Smoking status: Former     Current packs/day: 0.00     Average packs/day: 0.3 packs/day for 6.8 years (1.8 ttl pk-yrs)     Types: Cigarettes     Start date: 2009     Quit date: 3/7/2014     Years since quittin.3     Passive exposure: Past    Smokeless tobacco: Never   Vaping Use    Vaping status: Never Used   Substance and Sexual Activity    Alcohol use: Yes     Alcohol/week: 1.0 standard drink of alcohol     Types: 1 Drinks containing 0.5 oz of alcohol per week     Comment: Social    Drug use: Never    Sexual activity: Yes     Partners: Male     Birth control/protection: Post-menopausal, Hysterectomy      Social History     Social History Narrative    Not on file          Family History   Problem Relation Age of Onset    Arthritis Mother     Heart disease Mother     Arrhythmia Mother     Hypertension Father     Heart attack Father     Hyperlipidemia Father     Diabetes  "Father     Heart disease Father     Coronary artery disease Father     Diabetes Sister     Diabetes Paternal Grandmother     Cancer Cousin     Breast cancer Cousin     Cancer Maternal Great-Grandmother     Ovarian cancer Maternal Great-Grandmother     Malig Hyperthermia Neg Hx        Review of Systems:  A 14-point review of systems is reviewed with the patient.  Pertinent positives are listed above.  All others are negative.    Physical Exam: 65 y.o. female    Vitals:    07/11/25 1328   Temp: 97.3 °F (36.3 °C)   Weight: 92.3 kg (203 lb 6.4 oz)   Height: 160 cm (63\")       General:  Patient is awake and alert.  Appears in no acute distress or discomfort.    Psych:  Affect and demeanor are appropriate.    Eyes:  Conjunctivae and sclerae; appear grossly normal.  Eyes track well and EOM seems to be intact.    Ears:  No gross abnormalities.  Hearing adequate for the exam.    Cardiovascular:  Regular rate and rhythm.    Lungs:  Good chest expansion.  Breathing unlabored.    Lymph:  No palpable adenopathy about neck or axillae.    Right lower extremity:  Skin benign and intact without evidence for swelling, masses or atrophy.  No palpable masses.  Focal tenderness noted over medial joint line.  ROM is from 0-120°.   Knee is stable on exam.  Good strength throughout the lower leg and foot.  Intact sensation throughout.  Palpable pedal pulses with brisk cap refill.    Diagnostic Tests:  Lab Results   Component Value Date    GLUCOSE 100 (H) 07/01/2025    CALCIUM 9.8 07/01/2025     07/01/2025    K 4.7 07/01/2025    CO2 21.5 (L) 07/01/2025     07/01/2025    BUN 17.8 07/01/2025    CREATININE 1.03 (H) 07/01/2025    EGFRIFAFRI >60 03/31/2021    BCR 17.3 07/01/2025    ANIONGAP 11.5 07/01/2025     Lab Results   Component Value Date    WBC 6.27 07/08/2025    HGB 12.8 07/08/2025    HCT 39.1 07/08/2025    MCV 89.7 07/08/2025     07/08/2025     No results found for: \"INR\", \"PROTIME\"    Imaging:  AP, marat and " "lateral views of the right knee are ordered and reviewed along with a full length alignment x-ray.  These x-rays were taken to evaluate her complaint and presurgical planning.  These compared to previous x-rays.  The x-rays show tricompartment degenerative arthritis with joint space narrowing, osteophyte formation, and subchondral sclerosis.  The majority of the degenerative changes appear to involve the medial compartment.  The leg length alignment x-ray shows fairly neutral alignment.    She previous MRI of the right knee was reviewed and independently interpreted by Dr. Cantu.  The findings are as follows: \"She has a radial tear of the medial meniscus just adjacent to the root.  It does not look like a true root avulsion.  There is partial extrusion of the meniscus.  She has tricompartment osteoarthritis but it appears she has fairly advanced degenerative changes in the medial compartment with subchondral cyst formation in the tibia\".     Assessment:  Right knee osteoarthritis with medial meniscus tear and partial extrusion    Plan: We will plan on proceeding with a right total knee arthroplasty at the patient's request.  I reviewed details of procedure with patient today and discussed all the risks, benefits, alternatives, and limitations of the procedure in laymen's terms with the risks including but not limited to:  neurovascular damage resulting in permanent dysfunction or footdrop and potential need for further surgery, bleeding, infection, hematoma, chronic pain, worsening of pain, persistent symptoms potentially necessitating revision, prosthesis-related problems including loosening or allergy, swelling, loss of motion and arthrofibrosis, weakness, stiffness, instability, DVT, pulmonary embolus, death, stroke, complex regional pain syndrome, and need for additional procedures.  Patient verbalized understanding, and was given the opportunity to ask and have all questions answered today.  No guarantees " were given regarding results of surgery.    Patient is planning for hospital admission following surgery.  Denies history of DVT, PE, or metal allergy.    Padmini Small, APRN  07/11/25

## 2025-07-24 ENCOUNTER — ANESTHESIA (OUTPATIENT)
Dept: PERIOP | Facility: HOSPITAL | Age: 65
End: 2025-07-24
Payer: MEDICARE

## 2025-07-24 ENCOUNTER — APPOINTMENT (OUTPATIENT)
Dept: GENERAL RADIOLOGY | Facility: HOSPITAL | Age: 65
End: 2025-07-24
Payer: MEDICARE

## 2025-07-24 ENCOUNTER — HOSPITAL ENCOUNTER (OUTPATIENT)
Facility: HOSPITAL | Age: 65
Setting detail: OBSERVATION
Discharge: HOME | End: 2025-07-25
Attending: ORTHOPAEDIC SURGERY | Admitting: ORTHOPAEDIC SURGERY
Payer: MEDICARE

## 2025-07-24 ENCOUNTER — ANESTHESIA EVENT (OUTPATIENT)
Dept: PERIOP | Facility: HOSPITAL | Age: 65
End: 2025-07-24
Payer: MEDICARE

## 2025-07-24 DIAGNOSIS — M17.10 ARTHRITIS OF KNEE: ICD-10-CM

## 2025-07-24 DIAGNOSIS — Z96.651 S/P TKR (TOTAL KNEE REPLACEMENT), RIGHT: Primary | ICD-10-CM

## 2025-07-24 PROBLEM — Z96.659 H/O TOTAL KNEE REPLACEMENT: Status: ACTIVE | Noted: 2025-07-24

## 2025-07-24 LAB
GLUCOSE BLDC GLUCOMTR-MCNC: 109 MG/DL (ref 70–130)
GLUCOSE BLDC GLUCOMTR-MCNC: 136 MG/DL (ref 70–130)
GLUCOSE BLDC GLUCOMTR-MCNC: 224 MG/DL (ref 70–130)

## 2025-07-24 PROCEDURE — 25010000002 HYDROMORPHONE PER 4 MG: Performed by: NURSE ANESTHETIST, CERTIFIED REGISTERED

## 2025-07-24 PROCEDURE — 25010000002 KETOROLAC TROMETHAMINE PER 15 MG: Performed by: ORTHOPAEDIC SURGERY

## 2025-07-24 PROCEDURE — 25010000002 FAMOTIDINE 10 MG/ML SOLUTION: Performed by: ANESTHESIOLOGY

## 2025-07-24 PROCEDURE — 25010000002 METHYLPREDNISOLONE PER 80 MG: Performed by: ORTHOPAEDIC SURGERY

## 2025-07-24 PROCEDURE — 25010000002 MAGNESIUM SULFATE PER 500 MG OF MAGNESIUM: Performed by: NURSE ANESTHETIST, CERTIFIED REGISTERED

## 2025-07-24 PROCEDURE — 25010000002 DEXAMETHASONE PER 1 MG: Performed by: ANESTHESIOLOGY

## 2025-07-24 PROCEDURE — 25010000002 ROPIVACAINE PER 1 MG: Performed by: ORTHOPAEDIC SURGERY

## 2025-07-24 PROCEDURE — C1776 JOINT DEVICE (IMPLANTABLE): HCPCS | Performed by: ORTHOPAEDIC SURGERY

## 2025-07-24 PROCEDURE — 20610 DRAIN/INJ JOINT/BURSA W/O US: CPT | Performed by: ORTHOPAEDIC SURGERY

## 2025-07-24 PROCEDURE — 25010000002 FENTANYL CITRATE (PF) 50 MCG/ML SOLUTION: Performed by: NURSE ANESTHETIST, CERTIFIED REGISTERED

## 2025-07-24 PROCEDURE — 25010000002 SUGAMMADEX 200 MG/2ML SOLUTION: Performed by: NURSE ANESTHETIST, CERTIFIED REGISTERED

## 2025-07-24 PROCEDURE — 25810000003 LACTATED RINGERS SOLUTION: Performed by: ORTHOPAEDIC SURGERY

## 2025-07-24 PROCEDURE — 82948 REAGENT STRIP/BLOOD GLUCOSE: CPT

## 2025-07-24 PROCEDURE — 97162 PT EVAL MOD COMPLEX 30 MIN: CPT

## 2025-07-24 PROCEDURE — 97530 THERAPEUTIC ACTIVITIES: CPT

## 2025-07-24 PROCEDURE — 25010000002 MORPHINE PER 10 MG: Performed by: ORTHOPAEDIC SURGERY

## 2025-07-24 PROCEDURE — 25010000002 CEFAZOLIN PER 500 MG: Performed by: ORTHOPAEDIC SURGERY

## 2025-07-24 PROCEDURE — 25010000002 MIDAZOLAM PER 1 MG: Performed by: ANESTHESIOLOGY

## 2025-07-24 PROCEDURE — 25810000003 SODIUM CHLORIDE 0.9 % SOLUTION: Performed by: ORTHOPAEDIC SURGERY

## 2025-07-24 PROCEDURE — 25010000002 ROPIVACAINE PER 1 MG: Performed by: ANESTHESIOLOGY

## 2025-07-24 PROCEDURE — 27447 TOTAL KNEE ARTHROPLASTY: CPT | Performed by: ORTHOPAEDIC SURGERY

## 2025-07-24 PROCEDURE — 25010000002 LIDOCAINE 2% SOLUTION: Performed by: NURSE ANESTHETIST, CERTIFIED REGISTERED

## 2025-07-24 PROCEDURE — 25810000003 LACTATED RINGERS PER 1000 ML: Performed by: ANESTHESIOLOGY

## 2025-07-24 PROCEDURE — G0378 HOSPITAL OBSERVATION PER HR: HCPCS

## 2025-07-24 PROCEDURE — C1713 ANCHOR/SCREW BN/BN,TIS/BN: HCPCS | Performed by: ORTHOPAEDIC SURGERY

## 2025-07-24 PROCEDURE — 25010000002 HYDROMORPHONE 1 MG/ML SOLUTION: Performed by: NURSE ANESTHETIST, CERTIFIED REGISTERED

## 2025-07-24 PROCEDURE — 25010000002 EPINEPHRINE 1 MG/ML SOLUTION 30 ML VIAL: Performed by: ORTHOPAEDIC SURGERY

## 2025-07-24 PROCEDURE — 25010000002 PROPOFOL 10 MG/ML EMULSION: Performed by: NURSE ANESTHETIST, CERTIFIED REGISTERED

## 2025-07-24 PROCEDURE — 25010000002 VANCOMYCIN 10 G RECONSTITUTED SOLUTION: Performed by: ORTHOPAEDIC SURGERY

## 2025-07-24 PROCEDURE — 73560 X-RAY EXAM OF KNEE 1 OR 2: CPT

## 2025-07-24 PROCEDURE — 25010000002 ONDANSETRON PER 1 MG: Performed by: NURSE ANESTHETIST, CERTIFIED REGISTERED

## 2025-07-24 DEVICE — PALACOS® R IS A FAST-CURING, RADIOPAQUE, POLY(METHYL METHACRYLATE)-BASED BONE CEMENT.PALACOS ® R CONTAINS THE X-RAY CONTRAST MEDIUM ZIRCONIUM DIOXIDE. TO IMPROVE VISIBILITY IN THE SURGICAL FIELD PALACOS ® R HAS BEEN COLOURED WITH CHLOROPHYLL (E141). THE BONE CEMENT IS PREPARED DIRECTLY BEFORE USE BY MIXING A POLYMER POWDER COMPONENT WITH A LIQUID MONOMER COMPONENT. A DUCTILE DOUGH FORMS WHICH CURES WITHIN A FEW MINUTES.
Type: IMPLANTABLE DEVICE | Site: KNEE | Status: FUNCTIONAL
Brand: PALACOS®

## 2025-07-24 DEVICE — GENESIS II NON-POROUS TIBIAL                                    BASEPLATE SIZE 3 RIGHT
Type: IMPLANTABLE DEVICE | Site: KNEE | Status: FUNCTIONAL
Brand: GENESIS II

## 2025-07-24 DEVICE — GEN II 7.5MM RESUR PAT 35MM
Type: IMPLANTABLE DEVICE | Site: KNEE | Status: FUNCTIONAL
Brand: GENESIS II

## 2025-07-24 DEVICE — LEGION CRUCIATE RETAINING HIGH                                    FLEX HIGHLY CROSS LINKED                                    POLYETHYLENE SIZE 3-4 11MM
Type: IMPLANTABLE DEVICE | Site: KNEE | Status: FUNCTIONAL
Brand: LEGION

## 2025-07-24 DEVICE — DEV CONTRL TISS STRATAFIX SPIRAL MNCRYL UD 3/0 PLS 30CM: Type: IMPLANTABLE DEVICE | Site: KNEE | Status: FUNCTIONAL

## 2025-07-24 DEVICE — DEV CONTRL TISS STRATAFIX SYMM PDS PLUS VIL CT-1 60CM: Type: IMPLANTABLE DEVICE | Site: KNEE | Status: FUNCTIONAL

## 2025-07-24 DEVICE — LEGION CRUCIATE RETAINING OXINIUM FEMORAL SIZE 4 RIGHT
Type: IMPLANTABLE DEVICE | Site: KNEE | Status: FUNCTIONAL
Brand: LEGION

## 2025-07-24 DEVICE — IMPLANTABLE DEVICE: Type: IMPLANTABLE DEVICE | Status: FUNCTIONAL

## 2025-07-24 RX ORDER — DOCUSATE SODIUM 100 MG/1
100 CAPSULE, LIQUID FILLED ORAL 2 TIMES DAILY
Status: DISCONTINUED | OUTPATIENT
Start: 2025-07-24 | End: 2025-07-25 | Stop reason: HOSPADM

## 2025-07-24 RX ORDER — NALOXONE HCL 0.4 MG/ML
0.1 VIAL (ML) INJECTION
Status: DISCONTINUED | OUTPATIENT
Start: 2025-07-24 | End: 2025-07-25 | Stop reason: HOSPADM

## 2025-07-24 RX ORDER — PROMETHAZINE HYDROCHLORIDE 25 MG/1
25 TABLET ORAL ONCE AS NEEDED
Status: DISCONTINUED | OUTPATIENT
Start: 2025-07-24 | End: 2025-07-24 | Stop reason: HOSPADM

## 2025-07-24 RX ORDER — ONDANSETRON 2 MG/ML
4 INJECTION INTRAMUSCULAR; INTRAVENOUS ONCE AS NEEDED
Status: DISCONTINUED | OUTPATIENT
Start: 2025-07-24 | End: 2025-07-24 | Stop reason: HOSPADM

## 2025-07-24 RX ORDER — LIDOCAINE HYDROCHLORIDE 20 MG/ML
INJECTION, SOLUTION INFILTRATION; PERINEURAL AS NEEDED
Status: DISCONTINUED | OUTPATIENT
Start: 2025-07-24 | End: 2025-07-24 | Stop reason: SURG

## 2025-07-24 RX ORDER — OXYCODONE AND ACETAMINOPHEN 7.5; 325 MG/1; MG/1
1 TABLET ORAL EVERY 4 HOURS PRN
Status: DISCONTINUED | OUTPATIENT
Start: 2025-07-24 | End: 2025-07-24 | Stop reason: HOSPADM

## 2025-07-24 RX ORDER — SODIUM CHLORIDE 0.9 % (FLUSH) 0.9 %
3 SYRINGE (ML) INJECTION EVERY 12 HOURS SCHEDULED
Status: DISCONTINUED | OUTPATIENT
Start: 2025-07-24 | End: 2025-07-24 | Stop reason: HOSPADM

## 2025-07-24 RX ORDER — VANCOMYCIN/0.9 % SOD CHLORIDE 1.5G/250ML
15 PLASTIC BAG, INJECTION (ML) INTRAVENOUS ONCE
Status: COMPLETED | OUTPATIENT
Start: 2025-07-24 | End: 2025-07-24

## 2025-07-24 RX ORDER — ROPIVACAINE HYDROCHLORIDE 5 MG/ML
INJECTION, SOLUTION EPIDURAL; INFILTRATION; PERINEURAL
Status: COMPLETED | OUTPATIENT
Start: 2025-07-24 | End: 2025-07-24

## 2025-07-24 RX ORDER — METFORMIN HYDROCHLORIDE 500 MG/1
1000 TABLET, EXTENDED RELEASE ORAL
Status: DISCONTINUED | OUTPATIENT
Start: 2025-07-24 | End: 2025-07-25 | Stop reason: HOSPADM

## 2025-07-24 RX ORDER — ROSUVASTATIN CALCIUM 5 MG/1
5 TABLET, COATED ORAL WEEKLY
Status: DISCONTINUED | OUTPATIENT
Start: 2025-07-24 | End: 2025-07-25 | Stop reason: HOSPADM

## 2025-07-24 RX ORDER — BISACODYL 10 MG
10 SUPPOSITORY, RECTAL RECTAL DAILY PRN
Status: DISCONTINUED | OUTPATIENT
Start: 2025-07-24 | End: 2025-07-25 | Stop reason: HOSPADM

## 2025-07-24 RX ORDER — LABETALOL HYDROCHLORIDE 5 MG/ML
5 INJECTION, SOLUTION INTRAVENOUS
Status: DISCONTINUED | OUTPATIENT
Start: 2025-07-24 | End: 2025-07-24 | Stop reason: HOSPADM

## 2025-07-24 RX ORDER — PREGABALIN 75 MG/1
150 CAPSULE ORAL ONCE
Status: COMPLETED | OUTPATIENT
Start: 2025-07-24 | End: 2025-07-24

## 2025-07-24 RX ORDER — DEXAMETHASONE SODIUM PHOSPHATE 4 MG/ML
INJECTION, SOLUTION INTRA-ARTICULAR; INTRALESIONAL; INTRAMUSCULAR; INTRAVENOUS; SOFT TISSUE AS NEEDED
Status: DISCONTINUED | OUTPATIENT
Start: 2025-07-24 | End: 2025-07-24 | Stop reason: SURG

## 2025-07-24 RX ORDER — SODIUM CHLORIDE 0.9 % (FLUSH) 0.9 %
10 SYRINGE (ML) INJECTION AS NEEDED
Status: DISCONTINUED | OUTPATIENT
Start: 2025-07-24 | End: 2025-07-25 | Stop reason: HOSPADM

## 2025-07-24 RX ORDER — ATROPINE SULFATE 0.4 MG/ML
0.4 INJECTION, SOLUTION INTRAMUSCULAR; INTRAVENOUS; SUBCUTANEOUS ONCE AS NEEDED
Status: DISCONTINUED | OUTPATIENT
Start: 2025-07-24 | End: 2025-07-24 | Stop reason: HOSPADM

## 2025-07-24 RX ORDER — ALBUTEROL SULFATE 90 UG/1
2 INHALANT RESPIRATORY (INHALATION) EVERY 4 HOURS PRN
Status: DISCONTINUED | OUTPATIENT
Start: 2025-07-24 | End: 2025-07-24 | Stop reason: CLARIF

## 2025-07-24 RX ORDER — KETOROLAC TROMETHAMINE 15 MG/ML
15 INJECTION, SOLUTION INTRAMUSCULAR; INTRAVENOUS EVERY 6 HOURS PRN
Status: DISCONTINUED | OUTPATIENT
Start: 2025-07-24 | End: 2025-07-25 | Stop reason: HOSPADM

## 2025-07-24 RX ORDER — AMLODIPINE BESYLATE 5 MG/1
5 TABLET ORAL EVERY EVENING
Status: DISCONTINUED | OUTPATIENT
Start: 2025-07-24 | End: 2025-07-25 | Stop reason: HOSPADM

## 2025-07-24 RX ORDER — HYDROMORPHONE HYDROCHLORIDE 1 MG/ML
0.5 INJECTION, SOLUTION INTRAMUSCULAR; INTRAVENOUS; SUBCUTANEOUS
Status: DISCONTINUED | OUTPATIENT
Start: 2025-07-24 | End: 2025-07-24 | Stop reason: HOSPADM

## 2025-07-24 RX ORDER — POLYETHYLENE GLYCOL 3350 17 G/17G
17 POWDER, FOR SOLUTION ORAL DAILY
Status: DISCONTINUED | OUTPATIENT
Start: 2025-07-25 | End: 2025-07-25 | Stop reason: HOSPADM

## 2025-07-24 RX ORDER — ASPIRIN 81 MG/1
81 TABLET ORAL EVERY 12 HOURS SCHEDULED
Status: DISCONTINUED | OUTPATIENT
Start: 2025-07-25 | End: 2025-07-25 | Stop reason: HOSPADM

## 2025-07-24 RX ORDER — SODIUM CHLORIDE 0.9 % (FLUSH) 0.9 %
3-10 SYRINGE (ML) INJECTION AS NEEDED
Status: DISCONTINUED | OUTPATIENT
Start: 2025-07-24 | End: 2025-07-24 | Stop reason: HOSPADM

## 2025-07-24 RX ORDER — DROPERIDOL 2.5 MG/ML
0.62 INJECTION, SOLUTION INTRAMUSCULAR; INTRAVENOUS
Status: DISCONTINUED | OUTPATIENT
Start: 2025-07-24 | End: 2025-07-24 | Stop reason: HOSPADM

## 2025-07-24 RX ORDER — LIDOCAINE HYDROCHLORIDE 10 MG/ML
0.5 INJECTION, SOLUTION INFILTRATION; PERINEURAL ONCE AS NEEDED
Status: DISCONTINUED | OUTPATIENT
Start: 2025-07-24 | End: 2025-07-24 | Stop reason: HOSPADM

## 2025-07-24 RX ORDER — SODIUM CHLORIDE 9 MG/ML
40 INJECTION, SOLUTION INTRAVENOUS AS NEEDED
Status: DISCONTINUED | OUTPATIENT
Start: 2025-07-24 | End: 2025-07-25 | Stop reason: HOSPADM

## 2025-07-24 RX ORDER — NALOXONE HCL 0.4 MG/ML
0.2 VIAL (ML) INJECTION AS NEEDED
Status: DISCONTINUED | OUTPATIENT
Start: 2025-07-24 | End: 2025-07-24 | Stop reason: HOSPADM

## 2025-07-24 RX ORDER — DEXAMETHASONE SODIUM PHOSPHATE 4 MG/ML
INJECTION, SOLUTION INTRA-ARTICULAR; INTRALESIONAL; INTRAMUSCULAR; INTRAVENOUS; SOFT TISSUE
Status: COMPLETED | OUTPATIENT
Start: 2025-07-24 | End: 2025-07-24

## 2025-07-24 RX ORDER — TRANEXAMIC ACID 100 MG/ML
INJECTION, SOLUTION INTRAVENOUS AS NEEDED
Status: DISCONTINUED | OUTPATIENT
Start: 2025-07-24 | End: 2025-07-24 | Stop reason: SURG

## 2025-07-24 RX ORDER — DIPHENHYDRAMINE HYDROCHLORIDE 50 MG/ML
12.5 INJECTION, SOLUTION INTRAMUSCULAR; INTRAVENOUS
Status: DISCONTINUED | OUTPATIENT
Start: 2025-07-24 | End: 2025-07-24 | Stop reason: HOSPADM

## 2025-07-24 RX ORDER — SODIUM CHLORIDE 0.9 % (FLUSH) 0.9 %
3 SYRINGE (ML) INJECTION EVERY 12 HOURS SCHEDULED
Status: DISCONTINUED | OUTPATIENT
Start: 2025-07-24 | End: 2025-07-25 | Stop reason: HOSPADM

## 2025-07-24 RX ORDER — FLUMAZENIL 0.1 MG/ML
0.2 INJECTION INTRAVENOUS AS NEEDED
Status: DISCONTINUED | OUTPATIENT
Start: 2025-07-24 | End: 2025-07-24 | Stop reason: HOSPADM

## 2025-07-24 RX ORDER — ALBUTEROL SULFATE 0.83 MG/ML
2.5 SOLUTION RESPIRATORY (INHALATION) EVERY 4 HOURS PRN
Status: DISCONTINUED | OUTPATIENT
Start: 2025-07-24 | End: 2025-07-25 | Stop reason: HOSPADM

## 2025-07-24 RX ORDER — ONDANSETRON 4 MG/1
4 TABLET, ORALLY DISINTEGRATING ORAL EVERY 6 HOURS PRN
Status: DISCONTINUED | OUTPATIENT
Start: 2025-07-24 | End: 2025-07-25 | Stop reason: HOSPADM

## 2025-07-24 RX ORDER — HYDROCODONE BITARTRATE AND ACETAMINOPHEN 7.5; 325 MG/1; MG/1
2 TABLET ORAL EVERY 6 HOURS PRN
Status: DISCONTINUED | OUTPATIENT
Start: 2025-07-24 | End: 2025-07-25 | Stop reason: HOSPADM

## 2025-07-24 RX ORDER — ONDANSETRON 2 MG/ML
INJECTION INTRAMUSCULAR; INTRAVENOUS AS NEEDED
Status: DISCONTINUED | OUTPATIENT
Start: 2025-07-24 | End: 2025-07-24 | Stop reason: SURG

## 2025-07-24 RX ORDER — SODIUM CHLORIDE, SODIUM LACTATE, POTASSIUM CHLORIDE, CALCIUM CHLORIDE 600; 310; 30; 20 MG/100ML; MG/100ML; MG/100ML; MG/100ML
9 INJECTION, SOLUTION INTRAVENOUS CONTINUOUS
Status: DISCONTINUED | OUTPATIENT
Start: 2025-07-24 | End: 2025-07-24

## 2025-07-24 RX ORDER — MAGNESIUM HYDROXIDE 1200 MG/15ML
LIQUID ORAL AS NEEDED
Status: DISCONTINUED | OUTPATIENT
Start: 2025-07-24 | End: 2025-07-24 | Stop reason: HOSPADM

## 2025-07-24 RX ORDER — HYDROCODONE BITARTRATE AND ACETAMINOPHEN 7.5; 325 MG/1; MG/1
1 TABLET ORAL EVERY 6 HOURS PRN
Status: DISCONTINUED | OUTPATIENT
Start: 2025-07-24 | End: 2025-07-25 | Stop reason: HOSPADM

## 2025-07-24 RX ORDER — LOSARTAN POTASSIUM 25 MG/1
25 TABLET ORAL
Status: DISCONTINUED | OUTPATIENT
Start: 2025-07-24 | End: 2025-07-25 | Stop reason: HOSPADM

## 2025-07-24 RX ORDER — FENTANYL CITRATE 50 UG/ML
50 INJECTION, SOLUTION INTRAMUSCULAR; INTRAVENOUS
Status: DISCONTINUED | OUTPATIENT
Start: 2025-07-24 | End: 2025-07-24 | Stop reason: HOSPADM

## 2025-07-24 RX ORDER — ACETAMINOPHEN 500 MG
500 TABLET ORAL 2 TIMES DAILY PRN
Status: DISCONTINUED | OUTPATIENT
Start: 2025-07-24 | End: 2025-07-25 | Stop reason: HOSPADM

## 2025-07-24 RX ORDER — CLONIDINE HYDROCHLORIDE 0.1 MG/1
0.1 TABLET ORAL 2 TIMES DAILY PRN
Status: DISCONTINUED | OUTPATIENT
Start: 2025-07-24 | End: 2025-07-25 | Stop reason: HOSPADM

## 2025-07-24 RX ORDER — EPHEDRINE SULFATE 50 MG/ML
5 INJECTION, SOLUTION INTRAVENOUS ONCE AS NEEDED
Status: DISCONTINUED | OUTPATIENT
Start: 2025-07-24 | End: 2025-07-24 | Stop reason: HOSPADM

## 2025-07-24 RX ORDER — ACETAMINOPHEN 500 MG
1000 TABLET ORAL ONCE
Status: COMPLETED | OUTPATIENT
Start: 2025-07-24 | End: 2025-07-24

## 2025-07-24 RX ORDER — HYDROMORPHONE HYDROCHLORIDE 1 MG/ML
0.5 INJECTION, SOLUTION INTRAMUSCULAR; INTRAVENOUS; SUBCUTANEOUS
Status: DISCONTINUED | OUTPATIENT
Start: 2025-07-24 | End: 2025-07-25 | Stop reason: HOSPADM

## 2025-07-24 RX ORDER — ROCURONIUM BROMIDE 10 MG/ML
INJECTION, SOLUTION INTRAVENOUS AS NEEDED
Status: DISCONTINUED | OUTPATIENT
Start: 2025-07-24 | End: 2025-07-24 | Stop reason: SURG

## 2025-07-24 RX ORDER — PROMETHAZINE HYDROCHLORIDE 25 MG/1
25 SUPPOSITORY RECTAL ONCE AS NEEDED
Status: DISCONTINUED | OUTPATIENT
Start: 2025-07-24 | End: 2025-07-24 | Stop reason: HOSPADM

## 2025-07-24 RX ORDER — PROPOFOL 10 MG/ML
VIAL (ML) INTRAVENOUS AS NEEDED
Status: DISCONTINUED | OUTPATIENT
Start: 2025-07-24 | End: 2025-07-24 | Stop reason: SURG

## 2025-07-24 RX ORDER — ONDANSETRON 2 MG/ML
4 INJECTION INTRAMUSCULAR; INTRAVENOUS EVERY 6 HOURS PRN
Status: DISCONTINUED | OUTPATIENT
Start: 2025-07-24 | End: 2025-07-25 | Stop reason: HOSPADM

## 2025-07-24 RX ORDER — MELOXICAM 15 MG/1
15 TABLET ORAL DAILY
Status: DISCONTINUED | OUTPATIENT
Start: 2025-07-25 | End: 2025-07-25 | Stop reason: HOSPADM

## 2025-07-24 RX ORDER — FENTANYL CITRATE 50 UG/ML
INJECTION, SOLUTION INTRAMUSCULAR; INTRAVENOUS AS NEEDED
Status: DISCONTINUED | OUTPATIENT
Start: 2025-07-24 | End: 2025-07-24 | Stop reason: SURG

## 2025-07-24 RX ORDER — MAGNESIUM SULFATE HEPTAHYDRATE 500 MG/ML
INJECTION, SOLUTION INTRAMUSCULAR; INTRAVENOUS AS NEEDED
Status: DISCONTINUED | OUTPATIENT
Start: 2025-07-24 | End: 2025-07-24 | Stop reason: SURG

## 2025-07-24 RX ORDER — IPRATROPIUM BROMIDE AND ALBUTEROL SULFATE 2.5; .5 MG/3ML; MG/3ML
3 SOLUTION RESPIRATORY (INHALATION) ONCE AS NEEDED
Status: DISCONTINUED | OUTPATIENT
Start: 2025-07-24 | End: 2025-07-24 | Stop reason: HOSPADM

## 2025-07-24 RX ORDER — HYDROCODONE BITARTRATE AND ACETAMINOPHEN 5; 325 MG/1; MG/1
1 TABLET ORAL ONCE AS NEEDED
Status: DISCONTINUED | OUTPATIENT
Start: 2025-07-24 | End: 2025-07-24 | Stop reason: HOSPADM

## 2025-07-24 RX ORDER — HYDRALAZINE HYDROCHLORIDE 20 MG/ML
5 INJECTION INTRAMUSCULAR; INTRAVENOUS
Status: DISCONTINUED | OUTPATIENT
Start: 2025-07-24 | End: 2025-07-24 | Stop reason: HOSPADM

## 2025-07-24 RX ORDER — MIDAZOLAM HYDROCHLORIDE 1 MG/ML
1 INJECTION, SOLUTION INTRAMUSCULAR; INTRAVENOUS
Status: DISCONTINUED | OUTPATIENT
Start: 2025-07-24 | End: 2025-07-24 | Stop reason: HOSPADM

## 2025-07-24 RX ORDER — FAMOTIDINE 10 MG/ML
20 INJECTION, SOLUTION INTRAVENOUS ONCE
Status: COMPLETED | OUTPATIENT
Start: 2025-07-24 | End: 2025-07-24

## 2025-07-24 RX ADMIN — DEXAMETHASONE SODIUM PHOSPHATE 4 MG: 4 INJECTION, SOLUTION INTRA-ARTICULAR; INTRALESIONAL; INTRAMUSCULAR; INTRAVENOUS; SOFT TISSUE at 08:57

## 2025-07-24 RX ADMIN — Medication 3 ML: at 15:08

## 2025-07-24 RX ADMIN — CEFAZOLIN 2 G: 2 INJECTION, POWDER, FOR SOLUTION INTRAMUSCULAR; INTRAVENOUS at 08:59

## 2025-07-24 RX ADMIN — HYDROCODONE BITARTRATE AND ACETAMINOPHEN 1 TABLET: 7.5; 325 TABLET ORAL at 18:05

## 2025-07-24 RX ADMIN — ROCURONIUM BROMIDE 50 MG: 10 INJECTION, SOLUTION INTRAVENOUS at 09:11

## 2025-07-24 RX ADMIN — AMLODIPINE BESYLATE 5 MG: 5 TABLET ORAL at 22:22

## 2025-07-24 RX ADMIN — HYDROMORPHONE HYDROCHLORIDE 0.5 MG: 1 INJECTION, SOLUTION INTRAMUSCULAR; INTRAVENOUS; SUBCUTANEOUS at 10:43

## 2025-07-24 RX ADMIN — Medication 3 ML: at 22:00

## 2025-07-24 RX ADMIN — FENTANYL CITRATE 50 MCG: 50 INJECTION, SOLUTION INTRAMUSCULAR; INTRAVENOUS at 11:11

## 2025-07-24 RX ADMIN — SODIUM CHLORIDE, POTASSIUM CHLORIDE, SODIUM LACTATE AND CALCIUM CHLORIDE: 600; 310; 30; 20 INJECTION, SOLUTION INTRAVENOUS at 10:43

## 2025-07-24 RX ADMIN — ONDANSETRON 4 MG: 2 INJECTION, SOLUTION INTRAMUSCULAR; INTRAVENOUS at 10:24

## 2025-07-24 RX ADMIN — LOSARTAN POTASSIUM 25 MG: 25 TABLET, FILM COATED ORAL at 15:05

## 2025-07-24 RX ADMIN — LIDOCAINE HYDROCHLORIDE 100 MG: 20 INJECTION, SOLUTION INFILTRATION; PERINEURAL at 09:11

## 2025-07-24 RX ADMIN — DOCUSATE SODIUM 100 MG: 100 CAPSULE, LIQUID FILLED ORAL at 22:21

## 2025-07-24 RX ADMIN — TRANEXAMIC ACID 1000 MG: 100 INJECTION INTRAVENOUS at 10:21

## 2025-07-24 RX ADMIN — PREGABALIN 150 MG: 75 CAPSULE ORAL at 08:38

## 2025-07-24 RX ADMIN — ROPIVACAINE HYDROCHLORIDE 15 ML: 5 INJECTION EPIDURAL; INFILTRATION; PERINEURAL at 08:57

## 2025-07-24 RX ADMIN — SODIUM CHLORIDE 1500 MG: 9 INJECTION, SOLUTION INTRAVENOUS at 08:40

## 2025-07-24 RX ADMIN — FENTANYL CITRATE 50 MCG: 50 INJECTION, SOLUTION INTRAMUSCULAR; INTRAVENOUS at 09:28

## 2025-07-24 RX ADMIN — SODIUM CHLORIDE, POTASSIUM CHLORIDE, SODIUM LACTATE AND CALCIUM CHLORIDE: 600; 310; 30; 20 INJECTION, SOLUTION INTRAVENOUS at 09:11

## 2025-07-24 RX ADMIN — ACETAMINOPHEN 1000 MG: 500 TABLET, FILM COATED ORAL at 08:39

## 2025-07-24 RX ADMIN — FAMOTIDINE 20 MG: 10 INJECTION INTRAVENOUS at 08:55

## 2025-07-24 RX ADMIN — DEXAMETHASONE SODIUM PHOSPHATE 8 MG: 4 INJECTION, SOLUTION INTRA-ARTICULAR; INTRALESIONAL; INTRAMUSCULAR; INTRAVENOUS; SOFT TISSUE at 09:11

## 2025-07-24 RX ADMIN — MIDAZOLAM 1 MG: 1 INJECTION INTRAMUSCULAR; INTRAVENOUS at 08:54

## 2025-07-24 RX ADMIN — SODIUM CHLORIDE, POTASSIUM CHLORIDE, SODIUM LACTATE AND CALCIUM CHLORIDE 500 ML: 600; 310; 30; 20 INJECTION, SOLUTION INTRAVENOUS at 08:39

## 2025-07-24 RX ADMIN — MAGNESIUM SULFATE HEPTAHYDRATE 2 G: 500 INJECTION, SOLUTION INTRAMUSCULAR; INTRAVENOUS at 09:49

## 2025-07-24 RX ADMIN — ROSUVASTATIN CALCIUM 5 MG: 5 TABLET, FILM COATED ORAL at 15:05

## 2025-07-24 RX ADMIN — CEFAZOLIN 2000 MG: 2 INJECTION, POWDER, FOR SOLUTION INTRAMUSCULAR; INTRAVENOUS at 17:11

## 2025-07-24 RX ADMIN — METFORMIN HYDROCHLORIDE 1000 MG: 500 TABLET, EXTENDED RELEASE ORAL at 15:05

## 2025-07-24 RX ADMIN — HYDROCODONE BITARTRATE AND ACETAMINOPHEN 1 TABLET: 7.5; 325 TABLET ORAL at 11:30

## 2025-07-24 RX ADMIN — PROPOFOL 150 MG: 10 INJECTION, EMULSION INTRAVENOUS at 09:11

## 2025-07-24 RX ADMIN — HYDROMORPHONE HYDROCHLORIDE 0.5 MG: 1 INJECTION, SOLUTION INTRAMUSCULAR; INTRAVENOUS; SUBCUTANEOUS at 11:20

## 2025-07-24 RX ADMIN — SUGAMMADEX 200 MG: 100 INJECTION, SOLUTION INTRAVENOUS at 10:45

## 2025-07-24 NOTE — BRIEF OP NOTE
TOTAL KNEE ARTHROPLASTY  Progress Note    Alisha Connolly  7/24/2025    Pre-op Diagnosis:   Arthritis of knee [M17.10]       Post-Op Diagnosis Codes:     * Arthritis of knee [M17.10]    Procedure(s):      Procedure(s):  RIGHT TOTAL KNEE ARTHROPLASTY, left shoulder subacromial injection    Surgical Approach: Knee Medial Parapatellar            Surgeon(s):  Medhat Cantu MD    Anesthesia: General with Block    Staff:   Circulator: Steve Restrepo RN  Scrub Person: Olivia Hicks CSA; [employer] CSFA  Vendor Representative: Mohsen Keller       Estimated Blood Loss: minimal    Urine Voided: * No values recorded between 7/24/2025  9:03 AM and 7/24/2025 10:22 AM *    Specimens:                None      Drains: * No LDAs found *    Findings: See dictation      Complications: None          Medhat Cantu MD     Date: 7/24/2025  Time: 10:32 EDT

## 2025-07-24 NOTE — PLAN OF CARE
Goal Outcome Evaluation:  Plan of Care Reviewed With: patient        Progress: improving  Outcome Evaluation: Pt is a 65 y.o. female admitted to [facility] with c/o right knee end stage osteoarthritis on 7/24/2025. Pt is POD 0 R TKA. PMHx includes DM, HTN. Prior to hospital admission, pt reports residing in [facility] with spouse and 2 IRAJ. After the 2 IRAJ to enter, she has about 16 more IRAJ to complete. Prior to hospital stay, pt was I with ADLs and utilized cane and walker AD at baseline. Pt required min A for bed mobility, CGA/min A for STS transfers, and CGA for ambulation with FWW for 125 ft. Pt required min A for initial stand but is able to complete a second STS with CGA. Pt demonstrates good overall gait mechanics and is able to complete 4 IRAJ on this date with verbal cues and HHA with handrail on the R (ascending). Pt presents today with below baseline strength, dec endurance, and decreased functional mobility. Pt will benefit from skilled PT to address the previous deficits and improve overall safety with functional mobility. Anticipate pt will D/C to home with HH services followed by OP PT pending progress.    Anticipated Discharge Disposition (PT): home, home with home health

## 2025-07-24 NOTE — OP NOTE
Orthopaedic Operative Note    Facility: [facility]    Patient: Alisha Connolly    Medical Record Number: [id number]    YOB: 1960    Dictating Surgeon: Medhat Cantu M.D.*    Primary Care Physician: Aarti Ogden APRN    Date of Operation: 7/24/2025    Pre-Operative Diagnosis:    Right knee end-stage osteoarthritis  Chronic left shoulder pain    Post-Operative Diagnosis:    Right knee end-stage osteoarthritis  Chronic left shoulder pain    Procedure Performed:     Right total knee arthroplasty  Left shoulder intra-articular corticosteroid injection    Surgeon: Medhat Cantu MD     Assistant: Olivia Hicks whose assistance was critical for help with patient positioning, suctioning and irrigation, retraction, manipulation of the extremity for insertion of the implants, wound closure and application of the bandages.  Her assistance was critical to the success of this case.      Anesthesia: Regional followed by general.  Local administration of Ortho cocktail solution.    Complications: None.     Estimated Blood Loss: Less than 50 mL.     Implants:     1.  Smith [employer] Nephew size 4 Legion Oxinium femoral component  2.  Smith and Nephew size 3 tibial component with size 11 polyethylene liner  3.  Smith & Nephew size 35 patellar component    Specimens: * No orders in the log *    Brief Operative Indication: Ms. Connolly has a history of worsening knee osteoarthritis which had been refractory to prolonged conservative treatment.  The risk, benefits and alternatives to a total knee arthroplasty were discussed with the patient in detail.  She acknowledged understanding of the information and consented to proceed.  She was also having left shoulder pain.  She wanted to get the left shoulder injected in the same setting.,  Again, the risk, benefits and alternatives were discussed and she consented.    Description of the procedure in detail: The patient and operative site were identified in  the preoperative holding area.  The surgical site was marked.  Adequate regional anesthesia of the right lower extremity was administered. She was then taken to the operating room.  Adequate general anesthesia was administered. She was then repositioned on the operating table in the supine position.  A timeout was taken and preoperative antibiotics administered.    I began with a left shoulder injection.  The anterior aspect of the shoulder was prepped with a small ChloraPrep followed by alcohol prep pads.  Under sterile conditions without complication I injected the shoulder from an anterior approach with a solution of 80 mg Depo-Medrol and 3 cc quarter percent bupivacaine with epinephrine.  A sterile bandage was applied.    Next, the right lower extremity was prepped and draped in the standard, sterile fashion.  I began by cleaning the extremity with an alcohol solution.  A Hibiclens scrub was performed.  The extremity was then prepped with 2 ChloraPreps.  I allowed those to dry for 3 minutes before the draping procedure was carried out.  The leg was exsanguinated with an Esmarch bandage.  The tourniquet was inflated to 250 mmHg.  The leg was positioned at approximately 60 degrees of flexion across the knee in a DeMayo leg positioner.    I fashioned an approximately 8 cm incision anteriorly for a standard medial parapatellar approach.  Full-thickness medial and lateral skin flaps were developed.  The extensor mechanism was carefully exposed.  I performed a medial parapatellar arthrotomy, careful to maintain a cuff of tendinous tissue for later anatomic repair.  The joint was entered.  The infrapatellar fat pad was carefully removed.    Next, the anteromedial soft tissues were carefully elevated off of the anterior face of the tibia.  The MCL was kept protected at all times.  At this point, the joint was inspected.  There was marked arthrosis throughout the joint.  The periarticular osteophytes were carefully  removed with a rongeur.    An opening was created in the distal femur.  The wound was irrigated out and then the distal femur alignment keyon was inserted down the canal.  A 5 degree valgus distal femoral cutting guide was pinned into position and then the distal femoral cut carried out in the typical fashion.  I inspected and measured to make sure the cut was appropriate.  The cut portion of bone was removed followed by the guide.    Next, the knee was flexed up further to allow for insertion of the posterior referencing guide.  I measured the distal femur.  I determined the appropriate size as referenced off of the posterior condyles.  The femur measured a size 4.  The guide was positioned, taking care to align this properly and then the anterior, posterior and chamfer cuts were carried out.  The cut portions of bone were then removed.      I then directed my attention to the tibia.  Retractors were positioned to keep the collateral ligaments, PCL and posterior neurovascular structures protected.  The extramedullary guide was positioned.  I took care to align the keyon with the anterior face of the tibia.  I made sure that this was parallel and that the guide was centered at the knee and ankle.  I measured and carefully positioned the guide to allow for correction of the preoperative deformity.  I pinned the guide and then checked the alignment one more time with an peter wing.  Once we had the guide in good position and secure, an oscillating saw was used to carry out the proximal tibia cut.  The cut portion of bone was removed and the PCL inspected.  The PCL was intact and stable.  The menisci were removed and the posterior capsule was infiltrated with some of the Ortho cocktail solution.    Next, I measured the proximal tibia cut.  This measured a size 3.  The trial implant was pinned into position, taking care to maintain appropriate rotation.  The proximal tibial preparations were then completed.  I then trialed  with a size 4 femur and size 3 tibia.  The knee seemed to be well balanced and demonstrated excellent motion with a size 11 trial polyethylene liner.    I then examined the patella.  The patella demonstrated extensive arthrosis.  I determined that resurfacing was indicated.  The patellar preparations were carried out at this time and then I trialed with a size 35 patella.  With this implant in place, the patella tracked well.  A lateral release was deemed unnecessary in this case.    The final preparations were then completed.  The distal femur was prepared and then the trial implants were removed.  The appropriate size implants were opened at this point.  My assistant mixed the bone cement on the back table using current generation cement mixing technique and a centrifuge.  Once the cement was prepared, cement was applied to the bony surfaces and implants.  The implants were carefully impacted into position.  I made sure that these were fully seated.  The excess, extruded cement was carefully removed with a Nottingham elevator.  The knee was taken out into full extension and the trial polyethylene liner inserted.  The patella was then clamped into position.  Again, the excess, extruded cement was removed.  The knee was left in extension with the patella clamped until the cement had fully cured.    While the cement was curing, the periarticular soft tissue structures were carefully infiltrated with the Ortho cocktail solution.  Once the cement had fully cured, I again checked the balancing of the knee.  Again, the knee demonstrated excellent motion and stability with the 11 trial liner.  The trial was removed.  The final implant was impacted into position.  I took care to make sure that the dovetails were fully interdigitated.  Again the knee was carried through range of motion.  The patella tracked well and the knee demonstrated excellent motion and stability.    The wound was irrigated with 500 cc of a Betadine  containing saline solution.  This was left in place for 3 minutes.  I then irrigated with 3 L of sterile saline via pulsatile lavage.  The tourniquet was deflated.  A gram of transexamic acid was administered.  I made sure that we had good hemostasis.  The parapatellar arthrotomy was anatomically repaired using a PDS Stratafix suture and multiple #1 Vicryl sutures.  The subcutaneous tissues were repaired using 2-0 Vicryl.  A running Stratafix Monocryl suture was used to close the skin followed by a Zipline adhesive.  Sterile dressings were applied.  The drapes were withdrawn. She was awakened and taken to the recovery room in good condition.    Medhat Cantu MD  07/24/25

## 2025-07-24 NOTE — THERAPY EVALUATION
Patient Name: Alisha Connolly  : 1960    MRN: [id number]                              Today's Date: 2025       Admit Date: 2025    Visit Dx:     ICD-10-CM ICD-9-CM   1. Arthritis of knee  M17.10 716.96     Patient Active Problem List   Diagnosis    Age-related nuclear cataract of both eyes    Arthrodesis status    Benign essential hypertension    Chronic neck pain    Dry eye syndrome    Exogenous hypertriglyceridemia    S/P hysterectomy with oophorectomy    Hyperlipidemia    Hyperopia of both eyes    Hypertension    Keratoconjunctivitis sicca    Lung nodule    RAMU (obstructive sleep apnea)    History of solitary pulmonary nodule    Presbyopia    Type 2 diabetes mellitus without complication    Anal skin tag    Encounter for screening colonoscopy    Hemorrhoids    Closed nondisplaced fracture of lateral malleolus of right fibula    Anal fissure    Arthritis of left knee    Internal hemorrhoids with complication    External hemorrhoids with complication    Class 2 severe obesity due to excess calories with serious comorbidity and body mass index (BMI) of 38.0 to 38.9 in adult    RAMU on CPAP    Polyarthralgia    Chronic left shoulder pain    Arthritis of knee    Osteoarthritis of right knee    H/O total knee replacement     Past Medical History:   Diagnosis Date    Abnormal Pap smear of cervix     Age-related nuclear cataract of both eyes 2020    Anal fissure 10/2021    Anal skin tag 2022    S/P EXCISION    Anesthesia     PT WANTS YOU TO KNOW SHE HAS A PLATE IN HER NECK FROM FUSION    Ankle fracture 2022    RIGHT    Arthrodesis status 2014    Asthma     Benign essential hypertension 2015    Continue lisinopril    Carpal tunnel syndrome     PING    Cervical disc disorder 3/94    Titanium plate    Cervical spondylolysis     Chronic neck pain 2016    Closed nondisplaced fracture of lateral malleolus of right fibula 12/10/2022    NO SURGERY, SEEN AT [facility]    Cystitis 2017     Dry eye syndrome 2020    Environmental allergies     Exogenous hypertriglyceridemia 2017    Fracture, foot     Right    Ganglion cyst of finger 2020    Hemorrhoids     History of COVID-19 2022    History of torn meniscus of left knee     Hormone replacement therapy (HRT)     Hyperchylomicronemia     Hyperlipidemia 2015    ASCVD 10-year risk ~10% Plan: · Start statin. Unclear whether arm numbness really 2/2 statin in the past.    Hyperopia of both eyes 2020    Keratoconjunctivitis sicca 2020    Knee pain, left     Knee swelling 2025    Lichen sclerosus     Lung nodule 2006    Obstructive sleep apnea syndrome 2004    HAS C-PAP MACHINE    RAMU (obstructive sleep apnea) 2024    Home sleep study.  Weight 207 pounds.  Mild RAMU with AHI 8.2 events per hour.  No sleep-related hypoxia.  The patient snored 55% of total monitoring time.    Palpitations     Pertussis 2016    [facility]    PNA (pneumonia)     PONV (postoperative nausea and vomiting)     Presbyopia 2020    Rotator cuff injury 2011    SEEN AT [facility]    Rotator cuff syndrome     Seasonal allergies     Slow to wake up after anesthesia     Snoring     Tear of meniscus of knee     Left    Type 2 diabetes mellitus without complication 2015    Under good control Plan: · Reassured about use of metformin HAS DEXCOM     Past Surgical History:   Procedure Laterality Date    ANKLE OPEN REDUCTION INTERNAL FIXATION      ANTERIOR CERVICAL DISCECTOMY W/ FUSION N/A 2014    C6-7, DR. ALEXEY IZAGUIRRE AT [facility]. TITANIUM PLATE    ANUS SURGERY N/A 2022    Procedure: ANAL TAG EXCISION;  Surgeon: Sarai Perez MD;  Location: [facility] ENDOSCOPY;  Service: General;  Laterality: N/A;  pre-anal tag  post-same    CARPAL TUNNEL RELEASE Bilateral      SECTION          COLONOSCOPY N/A     COLONOSCOPY N/A 2022    EXTERNAL AND INTERNAL HEMORRHOIDS, GRADE  3-BANDED, RESCOPE IN 10 YRS, DR. SARAI SANTIAGO AT [facility]    HAND SURGERY      Carpal tunnel    HEMORRHOIDECTOMY N/A 09/19/2022    Procedure: HEMORRHOID BANDING x3;  Surgeon: Sarai Santiago MD;  Location: [facility] ENDOSCOPY;  Service: General;  Laterality: N/A;  pre-hemorrhoids  post-same    HERNIA REPAIR      HYSTERECTOMY N/A 1997    WITH BSO, FOR HEAVY BLEEDING    INCISIONAL BREAST BIOPSY Right 1980    BENIGN    JOINT REPLACEMENT  6/23    Knee left    LAPAROSCOPIC LYSIS OF ADHESIONS N/A     NECK SURGERY  4/94    OOPHORECTOMY      OOPHORECTOMY      SPHINCTEROTOMY N/A 02/09/2023    Procedure: EXAM UNDER ANESTHESIA, CHEMICAL SPHINCTEROTOMY WITH BOTOX;  Surgeon: Sarai Santiago MD;  Location: [facility] MAIN OR;  Service: General;  Laterality: N/A;    SPHINCTEROTOMY N/A 11/13/2023    Procedure: chemical sphincterotomy with Botox and hemorrhoidectomy;  Surgeon: Sarai Santiago MD;  Location: [facility] MAIN OR;  Service: General;  Laterality: N/A;    TONSILLECTOMY      TOTAL KNEE ARTHROPLASTY Left 06/20/2023    Procedure: LEFT TOTAL KNEE ARTHROPLASTY WITH JACE NAVIGATION;  Surgeon: Tomás Rasmussen MD;  Location: [facility] OR [facility];  Service: Orthopedics;  Laterality: Left;      General Information       Row Name 07/24/25 1620          Physical Therapy Time and Intention    Document Type evaluation  -     Mode of Treatment individual therapy;physical therapy  -       Row Name 07/24/25 1620          General Information    Patient Profile Reviewed yes  -     Prior Level of Function independent:;gait;transfer;ADL's;bed mobility;all household mobility  -     Existing Precautions/Restrictions fall  -     Barriers to Rehab none identified  -       Row Name 07/24/25 1620          Living Environment    Current Living Arrangements condominium  -     People in Home spouse  -       Row Name 07/24/25 1620          Home Main Entrance    Number of Stairs, Main Entrance two  16 more following initial 2  -MH     Stair Railings,  Main Entrance railings safe and in good condition  -       Row Name 07/24/25 1620          Cognition    Orientation Status (Cognition) oriented x 3  -       Row Name 07/24/25 1620          Safety Issues/Impairments Affecting Functional Mobility    Impairments Affecting Function (Mobility) endurance/activity tolerance;balance;strength;postural/trunk control;range of motion (ROM);pain  -     Comment, Safety Issues/Impairments (Mobility) Gait belt donned  -               User Key  (r) = Recorded By, (t) = Taken By, (c) = Cosigned By      Initials Name Provider Type     Kirit Pham, PT Physical Therapist                   Mobility       Row Name 07/24/25 1622          Bed Mobility    Bed Mobility scooting/bridging;supine-sit;sit-supine  -     Scooting/Bridging [place] (Bed Mobility) contact guard;1 person assist  -     Supine-Sit [place] (Bed Mobility) minimum assist (75% patient effort);1 person assist  -     Sit-Supine [place] (Bed Mobility) minimum assist (75% patient effort);1 person assist  -     Assistive Device (Bed Mobility) head of bed elevated;bed rails;leg   -       Row Name 07/24/25 1622          Sit-Stand Transfer    Sit-Stand [place] (Transfers) contact guard;minimum assist (75% patient effort);1 person assist  -     Assistive Device (Sit-Stand Transfers) walker, front-wheeled  -       Row Name 07/24/25 1622          Gait/Stairs (Locomotion)    [place] Level (Gait) 1 person assist;contact guard  -     Assistive Device (Gait) walker, front-wheeled  -     Patient was able to Ambulate yes  -     Distance in Feet (Gait) 125  -     Deviations/Abnormal Patterns (Gait) gait speed decreased;weight shifting decreased;stride length decreased;juan c decreased  -     Bilateral Gait Deviations heel strike decreased;forward flexed posture;weight shift ability decreased  -     [place] Level (Stairs) contact guard;1 person assist;minimum assist  (75% patient effort)  -     Assistive Device (Stairs) other (see comments)  HHA  -     Handrail Location (Stairs) right side (ascending);left side (descending)  -     Number of Steps (Stairs) 4  -     Ascending Technique (Stairs) step-to-step  -     Descending Technique (Stairs) step-to-step  -       Row Name 07/24/25 1622          Mobility    Extremity Weight-bearing Status right lower extremity  -     Right Lower Extremity (Weight-bearing Status) weight-bearing as tolerated (WBAT)  -               User Key  (r) = Recorded By, (t) = Taken By, (c) = Cosigned By      Initials Name Provider Type     Kirit Pham, PT Physical Therapist                   Obj/Interventions       Row Name 07/24/25 1623          Range of Motion Comprehensive    General Range of Motion bilateral lower extremity ROM WFL;bilateral upper extremity ROM WFL  -       Row Name 07/24/25 1623          Strength Comprehensive (MMT)    Comment, General Manual Muscle Testing (MMT) Assessment Grossly 4/5 L LE; did not assess R LE  -       Row Name 07/24/25 1623          Balance    Balance Assessment sitting static balance;sitting dynamic balance;standing static balance;standing dynamic balance  -     Static Sitting Balance standby assist  -     Dynamic Sitting Balance standby assist  -     Position, Sitting Balance sitting edge of bed  -     Static Standing Balance standby assist  -     Dynamic Standing Balance contact guard;minimal assist  -     Position/Device Used, Standing Balance walker, front-wheeled  -     Balance Interventions sitting;standing;sit to stand;static;dynamic;minimal challenge  -               User Key  (r) = Recorded By, (t) = Taken By, (c) = Cosigned By      Initials Name Provider Type     Kirit Pham, PT Physical Therapist                   Goals/Plan       Row Name 07/24/25 1624          Bed Mobility Goal 1 (PT)    Activity/Assistive Device (Bed Mobility Goal 1, PT) bed mobility  activities, all  -MH     [place] Level/Cues Needed (Bed Mobility Goal 1, PT) independent  -MH     Time Frame (Bed Mobility Goal 1, PT) short term goal (STG);1 week  -MH     Progress/Outcomes (Bed Mobility Goal 1, PT) new goal  -MH       Row Name 07/24/25 1624          Transfer Goal 1 (PT)    Activity/Assistive Device (Transfer Goal 1, PT) transfers, all  -MH     [place] Level/Cues Needed (Transfer Goal 1, PT) independent  -MH     Time Frame (Transfer Goal 1, PT) 1 week;short term goal (STG)  -MH     Progress/Outcome (Transfer Goal 1, PT) new goal  -       Row Name 07/24/25 1624          Gait Training Goal 1 (PT)    Activity/Assistive Device (Gait Training Goal 1, PT) gait (walking locomotion)  -MH     [place] Level (Gait Training Goal 1, PT) contact guard required  -MH     Distance (Gait Training Goal 1, PT) 250  -MH     Time Frame (Gait Training Goal 1, PT) short term goal (STG);1 week  -MH     Progress/Outcome (Gait Training Goal 1, PT) new goal  -       Row Name 07/24/25 1624          Stairs Goal 1 (PT)    Activity/Assistive Device (Stairs Goal 1, PT) stairs, all skills  -MH     [place] Level/Cues Needed (Stairs Goal 1, PT) contact guard required  -MH     Number of Stairs (Stairs Goal 1, PT) 8  -MH     Time Frame (Stairs Goal 1, PT) short term goal (STG);1 week  -MH     Progress/Outcome (Stairs Goal 1, PT) new goal  -       Row Name 07/24/25 1624          Therapy Assessment/Plan (PT)    Planned Therapy Interventions (PT) balance training;bed mobility training;gait training;home exercise program;patient/family education;postural re-education;ROM (range of motion);stair training;strengthening;stretching;transfer training;neuromuscular re-education  -               User Key  (r) = Recorded By, (t) = Taken By, (c) = Cosigned By      Initials Name Provider Type    Kirit Mazariegos, PT Physical Therapist                   Clinical Impression       Row Name 07/24/25 1623          Pain     Pretreatment Pain Rating 3/10  -MH     Posttreatment Pain Rating 3/10  -     Pain Location knee  -     Pain Side/Orientation right  -       Row Name 07/24/25 1623          Plan of Care Review    Plan of Care Reviewed With patient  -MH     Progress improving  -     Outcome Evaluation Pt is a 65 y.o. female admitted to [facility] with c/o right knee end stage osteoarthritis on 7/24/2025. Pt is POD 0 R TKA. PMHx includes DM, HTN. Prior to hospital admission, pt reports residing in [facility] with spouse and 2 IRAJ. After the 2 IRAJ to enter, she has about 16 more IRAJ to complete. Prior to hospital stay, pt was I with ADLs and utilized cane and walker AD at baseline. Pt required min A for bed mobility, CGA/min A for STS transfers, and CGA for ambulation with FWW for 125 ft. Pt required min A for initial stand but is able to complete a second STS with CGA. Pt demonstrates good overall gait mechanics and is able to complete 4 IRAJ on this date with verbal cues and HHA with handrail on the R (ascending). Pt presents today with below baseline strength, dec endurance, and decreased functional mobility. Pt will benefit from skilled PT to address the previous deficits and improve overall safety with functional mobility. Anticipate pt will D/C to home with HH services followed by OP PT pending progress.  -       Row Name 07/24/25 1623          Therapy Assessment/Plan (PT)    Rehab Potential (PT) good  -     Criteria for Skilled Interventions Met (PT) yes  -     Therapy Frequency (PT) daily  -       Row Name 07/24/25 1623          Vital Signs    O2 Delivery Pre Treatment room air  -     O2 Delivery Intra Treatment room air  -     O2 Delivery Post Treatment room air  -     Pre Patient Position Supine  -     Intra Patient Position Standing  -     Post Patient Position Supine  -       Row Name 07/24/25 1623          Positioning and Restraints    Pre-Treatment Position in bed  -     Post Treatment Position bed  -      In Bed call light within reach;encouraged to call for assist;exit alarm on;fowlers;with family/caregiver  -               User Key  (r) = Recorded By, (t) = Taken By, (c) = Cosigned By      Initials Name Provider Type    Kirit Mazariegos, PT Physical Therapist                   Outcome Measures       Row Name 07/24/25 1624 07/24/25 1333       How much help from another person do you currently need...    Turning from your back to your side while in flat bed without using bedrails? 4  - 3  -ST    Moving from lying on back to sitting on the side of a flat bed without bedrails? 3  - 3  -ST    Moving to and from a bed to a chair (including a wheelchair)? 3  - 3  -ST    Standing up from a chair using your arms (e.g., wheelchair, bedside chair)? 3  - 3  -ST    Climbing 3-5 steps with a railing? 3  - 3  -ST    To walk in hospital room? 3  - 3  -ST    AM-PAC 6 Clicks Score (PT) 19  - 18  -ST    Highest Level of Mobility Goal Walk 10 Steps or More-6  - Walk 10 Steps or More-6  -ST      Row Name 07/24/25 1624          Functional Assessment    Outcome Measure Options AM-PAC 6 Clicks Basic Mobility (PT)  -               User Key  (r) = Recorded By, (t) = Taken By, (c) = Cosigned By      Initials Name Provider Type    Chika Alvarenga, RN Registered Nurse    Kirit Mazariegos, PT Physical Therapist                                 Physical Therapy Education       Title: PT OT SLP Therapies (In Progress)       Topic: Physical Therapy (Done)       Point: Mobility training (Done)       Learning Progress Summary            Patient Acceptance, E, DU,NR,VU by  at 7/24/2025 1624                      Point: Home exercise program (Done)       Learning Progress Summary            Patient Acceptance, E, DU,NR,VU by  at 7/24/2025 1624                      Point: Body mechanics (Done)       Learning Progress Summary            Patient Acceptance, E, DU,NR,VU by  at 7/24/2025 1624                      Point:  Precautions (Done)       Learning Progress Summary            Patient Acceptance, E, DU,NR,VU by  at 7/24/2025 2799                                      User Key       Initials Effective Dates Name Provider Type Discipline     09/11/24 -  Kirit Pham, HAYLEE Physical Therapist PT                  PT Recommendation and Plan  Planned Therapy Interventions (PT): balance training, bed mobility training, gait training, home exercise program, patient/family education, postural re-education, ROM (range of motion), stair training, strengthening, stretching, transfer training, neuromuscular re-education  Progress: improving  Outcome Evaluation: Pt is a 65 y.o. female admitted to [facility] with c/o right knee end stage osteoarthritis on 7/24/2025. Pt is POD 0 R TKA. PMHx includes DM, HTN. Prior to hospital admission, pt reports residing in [facility] with spouse and 2 IRAJ. After the 2 IRAJ to enter, she has about 16 more IRAJ to complete. Prior to hospital stay, pt was I with ADLs and utilized cane and walker AD at baseline. Pt required min A for bed mobility, CGA/min A for STS transfers, and CGA for ambulation with FWW for 125 ft. Pt required min A for initial stand but is able to complete a second STS with CGA. Pt demonstrates good overall gait mechanics and is able to complete 4 IRAJ on this date with verbal cues and HHA with handrail on the R (ascending). Pt presents today with below baseline strength, dec endurance, and decreased functional mobility. Pt will benefit from skilled PT to address the previous deficits and improve overall safety with functional mobility. Anticipate pt will D/C to home with HH services followed by OP PT pending progress.     Time Calculation:         PT Charges       Row Name 07/24/25 1621             Time Calculation    Start Time 1426  -      Stop Time 1456  -      Time Calculation (min) 30 min  -      PT Received On 07/24/25  -      PT - Next Appointment 07/25/25  -      PT Goal Re-Cert Due  Date 08/03/25  -         Time Calculation- PT    Total Timed Code Minutes- PT 15 minute(s)  -MH         Timed Charges    61363 - PT Therapeutic Activity Minutes 15  -MH         Untimed Charges    PT Eval/Re-eval Minutes 15  -MH         Total Minutes    Timed Charges Total Minutes 15  -MH      Untimed Charges Total Minutes 15  -MH       Total Minutes 30  -MH                User Key  (r) = Recorded By, (t) = Taken By, (c) = Cosigned By      Initials Name Provider Type     Kirit Pham, PT Physical Therapist                  Therapy Charges for Today       Code Description Service Date Service Provider Modifiers Qty    91167831392 HC PT EVAL MOD COMPLEXITY 3 7/24/2025 Kirit Pham, PT GP 1    76693024777 HC PT THERAPEUTIC ACT EA 15 MIN 7/24/2025 Kirit Pham, PT GP 1            PT G-Codes  Outcome Measure Options: AM-PAC 6 Clicks Basic Mobility (PT)  AM-PAC 6 Clicks Score (PT): 19  PT Discharge Summary  Anticipated Discharge Disposition (PT): home, home with home health    Kirit Pham, PT  7/24/2025

## 2025-07-24 NOTE — ANESTHESIA PREPROCEDURE EVALUATION
Anesthesia Evaluation     Patient summary reviewed and Nursing notes reviewed   history of anesthetic complications:  PONV, prolonged sedation  NPO Solid Status: > 8 hours  NPO Liquid Status: > 2 hours           Airway   Mallampati: II  TM distance: >3 FB  Neck ROM: full  No difficulty expected  Dental - normal exam     Pulmonary    (+) asthma,sleep apnea on CPAP  Cardiovascular     ECG reviewed    (+) hypertension, hyperlipidemia      Neuro/Psych  (+) numbness  GI/Hepatic/Renal/Endo    (+) obesity, diabetes mellitus type 2    Musculoskeletal     (+) neck pain (s/p neck fusion)  Abdominal    Substance History      OB/GYN          Other   arthritis,     ROS/Med Hx Other: GLP-1 agonist LD over 1 week ago                  Anesthesia Plan    ASA 3     general   total IV anesthesia  (TIVA for PONV prophylaxis and history of prolonged awakening  Block for POPC PSR    I have reviewed the patient's history and chart with the patient, including all pertinent laboratory results and imaging. I have explained the risks of anesthesia including but not limited to dental damage, corneal abrasion, nerve injury, MI, stroke, aspiration, and death. Patient has agreed to proceed.      Risks of peripheral nerve block were discussed with patient including but not limited to: inadequate block, bleeding, infection, persistent numbness or weakness, nerve damage, painful dysasthesia and need to protect limb while numb.      LMP  (LMP Unknown)   )  intravenous induction     Anesthetic plan, risks, benefits, and alternatives have been provided, discussed and informed consent has been obtained with: patient.    CODE STATUS:

## 2025-07-24 NOTE — DISCHARGE PLACEMENT REQUEST
"aCmi Smart (65 y.o. Female)       Date of Birth   1960    Social Security Number       Address   [address] [address]    Home Phone   [phone]    MRN   [id number]       Ev[employer]   [employer]thodoxy    Marital Status                               Admission Date   7/24/2025    Admission Type   Elective    Admitting Provider   Medhat Cantu MD    Attending Provider   Medhat Cantu MD    Department, Room/Bed   [address], P791/1       Discharge Date       Discharge Disposition       Discharge Destination                                 Attending Provider: Medhat Cantu MD    Allergies: Penicillins, Atorvastatin, Rosuvastatin, Latex, Tetanus Toxoid, Adsorbed    Isolation: None   Infection: None   Code Status: CPR    Ht: 160 cm (63\")   Wt: 92.3 kg (203 lb 6.4 oz)    Admission Cmt: None   Principal Problem: Arthritis of knee [M17.10]                   Active Insurance as of 7/24/2025       Primary Coverage       Payor Plan Insurance Group Employer/Plan Group    MEDICARE MEDICARE A & B        Payor Plan Address Payor Plan Phone Number Payor Plan Fax Number Effective Dates    [address] [phone]  1/1/2023 - None Entered    [facility] [address]         Subscriber Name Subscriber Birth Date Member ID       CAMI SMART 1960 [id number]               Secondary Coverage       Payor Plan Insurance Group Employer/Plan Group    [employer] AAR HEALTH CARE OPTIONS        Payor Plan Address Payor Plan Phone Number Payor Plan Fax Number Effective Dates    [facility] [phone]  1/1/2023 - None Entered    [address]       [facility] [address]         Subscriber Name Subscriber Birth Date Member ID       CAMI SMART 1960 [id number]                     Emergency Contacts        (Rel.) Home Phone Work Phone Mobile Phone    KELSEA SMART (Spouse) -- -- [phone]                "

## 2025-07-24 NOTE — ANESTHESIA PROCEDURE NOTES
ACB      Patient reassessed immediately prior to procedure    Patient location during procedure: pre-op  Start time: 7/24/2025 8:55 AM  Stop time: 7/24/2025 8:57 AM  Reason for block: at surgeon's request and post-op pain management  Performed by  Anesthesiologist: Marisel yAala MD  Preanesthetic Checklist  Completed: patient identified, IV checked, site marked, risks and benefits discussed, surgical consent, monitors and equipment checked, pre-op evaluation and timeout performed  Prep:  Pt Position: sitting  Sterile barriers:cap, gloves and mask  Prep: ChloraPrep  Patient monitoring: blood pressure monitoring, continuous pulse oximetry and EKG  Procedure    Sedation: yes  Performed under: local infiltration  Guidance:ultrasound guided    ULTRASOUND INTERPRETATION.  Using ultrasound guidance a 21 G gauge needle was placed in close proximity to the nerve, at which point, under ultrasound guidance anesthetic was injected in the area of the nerve and spread of the anesthesia was seen on ultrasound in close proximity thereto.  There were no abnormalities seen on ultrasound; a digital image was taken; and the patient tolerated the procedure with no complications. Images:still images obtained, printed/placed on chart    Laterality:right  Block Type:adductor canal block  Injection Technique:single-shot  Needle Type:short-bevel and echogenic  Needle Gauge:21 G  Resistance on Injection: none    Medications Used: dexamethasone (DECADRON) injection - Injection   4 mg - 7/24/2025 8:57:00 AM  ropivacaine (NAROPIN) 0.5 % injection - Injection   15 mL - 7/24/2025 8:57:00 AM      Medications  Comment:Ultrasound Interpretation:  Using ultrasound guidance the needle was placed in close proximity to the target nerve and anesthetic was injected in the area of the target nerve and/or bundles, and spread of the anesthetic was seen on ultrasound in close proximity thereto.  There were no abnormalities seen on ultrasound; a digital  image was taken; and the patient tolerated the procedure with no complications.    Block placed for postoperative pain control per surgeon request.       Post Assessment  Injection Assessment: negative aspiration for heme, no paresthesia on injection and incremental injection  Patient Tolerance:comfortable throughout block  Complications:no  Performed by: Marisel Ayala MD

## 2025-07-24 NOTE — ANESTHESIA PROCEDURE NOTES
Airway  Reason: elective    Date/Time: 7/24/2025 9:14 AM  Airway not difficult    General Information and Staff    Patient location during procedure: OR  Anesthesiologist: Chico Bender MD  CRNA/CAA: Sayda Mays CRNA    Indications and Patient Condition  Indications for airway management: airway protection    Preoxygenated: yes    Mask difficulty assessment: 2 - vent by mask + OA or adjuvant +/- NMBA    Final Airway Details    Final airway type: endotracheal airway      Successful airway: ETT  Cuffed: yes   Successful intubation technique: direct laryngoscopy  Adjuncts used in placement: intubating stylet  Endotracheal tube insertion site: oral  Blade: Shannan  Blade size: 3  ETT size (mm): 7.0  Cormack-Lehane Classification: grade I - full view of glottis  Placement verified by: chest auscultation and capnometry   Cuff volume (mL): 6  Measured from: teeth  ETT/EBT  to teeth (cm): 21  Number of attempts at approach: 1  Assessment: lips, teeth, and gum same as pre-op and atraumatic intubation

## 2025-07-24 NOTE — PLAN OF CARE
Goal Outcome Evaluation:   Patient is POD 0. Aox4. VSS. RA. Ambulating assist x1 with a walker. YOHAN dressing remains intact and flashing green. Pain has been adequately controlled with PRN Moore. Plan at discharge is home with HH, likely tomorrow. All needs currently met, will continue to monitor.

## 2025-07-24 NOTE — ANESTHESIA POSTPROCEDURE EVALUATION
Patient: Alisha Connolly    Procedure Summary       Date: 07/24/25 Room / Location:  [facility]  /  [facility]    Anesthesia Start: 0904 Anesthesia Stop: 1100    Procedure: RIGHT TOTAL KNEE ARTHROPLASTY, left shoulder subacromial injection (Right: Knee) Diagnosis:       Arthritis of knee      (Arthritis of knee [M17.10])    Surgeons: Medhat Cantu MD Provider: Chico Bender MD    Anesthesia Type: general ASA Status: 3            Anesthesia Type: general    Vitals  Vitals Value Taken Time   /58 07/24/25 12:45   Temp 36.4 °C (97.6 °F) 07/24/25 12:45   Pulse 72 07/24/25 12:55   Resp 16 07/24/25 12:45   SpO2 100 % 07/24/25 12:55   Vitals shown include unfiled device data.        Anesthesia Post Evaluation

## 2025-07-24 NOTE — NURSING NOTE
Patient admitted to 7P from post op right total knee replacement  and left shoulder injection. Dressing is C/D/I. YOHAN with green flashing light. ICE pack in place. AAOX4. RA. Ambulated to the bathroom AX1-2 w/gait belt and walker. Voided 300 ml. Spouse and daughter at bedside. Pain 3/10.

## 2025-07-25 ENCOUNTER — READMISSION MANAGEMENT (OUTPATIENT)
Dept: CALL CENTER | Facility: HOSPITAL | Age: 65
End: 2025-07-25
Payer: MEDICARE

## 2025-07-25 VITALS
HEART RATE: 76 BPM | SYSTOLIC BLOOD PRESSURE: 146 MMHG | RESPIRATION RATE: 18 BRPM | OXYGEN SATURATION: 95 % | BODY MASS INDEX: 36.05 KG/M2 | WEIGHT: 203.48 LBS | HEIGHT: 63 IN | DIASTOLIC BLOOD PRESSURE: 60 MMHG | TEMPERATURE: 97.8 F

## 2025-07-25 LAB
HCT VFR BLD AUTO: 33.5 % (ref 34–46.6)
HGB BLD-MCNC: 11.1 G/DL (ref 12–15.9)

## 2025-07-25 PROCEDURE — 99024 POSTOP FOLLOW-UP VISIT: CPT | Performed by: NURSE PRACTITIONER

## 2025-07-25 PROCEDURE — 97530 THERAPEUTIC ACTIVITIES: CPT

## 2025-07-25 PROCEDURE — G0378 HOSPITAL OBSERVATION PER HR: HCPCS

## 2025-07-25 PROCEDURE — 85014 HEMATOCRIT: CPT | Performed by: ORTHOPAEDIC SURGERY

## 2025-07-25 PROCEDURE — 97110 THERAPEUTIC EXERCISES: CPT

## 2025-07-25 PROCEDURE — 85018 HEMOGLOBIN: CPT | Performed by: ORTHOPAEDIC SURGERY

## 2025-07-25 PROCEDURE — 25010000002 CEFAZOLIN PER 500 MG: Performed by: ORTHOPAEDIC SURGERY

## 2025-07-25 RX ORDER — ASPIRIN 81 MG/1
81 TABLET ORAL EVERY 12 HOURS SCHEDULED
Qty: 60 TABLET | Refills: 0 | Status: SHIPPED | OUTPATIENT
Start: 2025-07-25

## 2025-07-25 RX ORDER — ONDANSETRON 4 MG/1
4 TABLET, ORALLY DISINTEGRATING ORAL EVERY 6 HOURS PRN
Qty: 12 TABLET | Refills: 0 | Status: SHIPPED | OUTPATIENT
Start: 2025-07-25

## 2025-07-25 RX ORDER — PSEUDOEPHEDRINE HCL 30 MG
100 TABLET ORAL 2 TIMES DAILY
Qty: 60 CAPSULE | Refills: 0 | Status: SHIPPED | OUTPATIENT
Start: 2025-07-25

## 2025-07-25 RX ORDER — HYDROCODONE BITARTRATE AND ACETAMINOPHEN 7.5; 325 MG/1; MG/1
1 TABLET ORAL EVERY 6 HOURS PRN
Qty: 28 TABLET | Refills: 0 | Status: SHIPPED | OUTPATIENT
Start: 2025-07-25 | End: 2025-07-30 | Stop reason: SDUPTHER

## 2025-07-25 RX ORDER — HYDROCODONE BITARTRATE AND ACETAMINOPHEN 7.5; 325 MG/1; MG/1
1 TABLET ORAL ONCE
Refills: 0 | Status: COMPLETED | OUTPATIENT
Start: 2025-07-25 | End: 2025-07-25

## 2025-07-25 RX ADMIN — HYDROCODONE BITARTRATE AND ACETAMINOPHEN 1 TABLET: 7.5; 325 TABLET ORAL at 13:59

## 2025-07-25 RX ADMIN — POLYETHYLENE GLYCOL 3350 17 G: 17 POWDER, FOR SOLUTION ORAL at 09:36

## 2025-07-25 RX ADMIN — Medication 3 ML: at 09:37

## 2025-07-25 RX ADMIN — DOCUSATE SODIUM 100 MG: 100 CAPSULE, LIQUID FILLED ORAL at 09:35

## 2025-07-25 RX ADMIN — CEFAZOLIN 2000 MG: 2 INJECTION, POWDER, FOR SOLUTION INTRAMUSCULAR; INTRAVENOUS at 01:00

## 2025-07-25 RX ADMIN — HYDROCODONE BITARTRATE AND ACETAMINOPHEN 2 TABLET: 7.5; 325 TABLET ORAL at 09:36

## 2025-07-25 RX ADMIN — LOSARTAN POTASSIUM 25 MG: 25 TABLET, FILM COATED ORAL at 09:36

## 2025-07-25 RX ADMIN — HYDROCODONE BITARTRATE AND ACETAMINOPHEN 1 TABLET: 7.5; 325 TABLET ORAL at 01:49

## 2025-07-25 RX ADMIN — METFORMIN HYDROCHLORIDE 1000 MG: 500 TABLET, EXTENDED RELEASE ORAL at 09:35

## 2025-07-25 RX ADMIN — ASPIRIN 81 MG: 81 TABLET, COATED ORAL at 09:35

## 2025-07-25 NOTE — THERAPY TREATMENT NOTE
Patient Name: Alisha Connolly  : 1960    MRN: [id number]                              Today's Date: 2025       Admit Date: 2025    Visit Dx:     ICD-10-CM ICD-9-CM   1. S/P TKR (total knee replacement), right  Z96.651 V43.65   2. Arthritis of knee  M17.10 716.96     Patient Active Problem List   Diagnosis    Age-related nuclear cataract of both eyes    Arthrodesis status    Benign essential hypertension    Chronic neck pain    Dry eye syndrome    Exogenous hypertriglyceridemia    S/P hysterectomy with oophorectomy    Hyperlipidemia    Hyperopia of both eyes    Hypertension    Keratoconjunctivitis sicca    Lung nodule    RAMU (obstructive sleep apnea)    History of solitary pulmonary nodule    Presbyopia    Type 2 diabetes mellitus without complication    Anal skin tag    Encounter for screening colonoscopy    Hemorrhoids    Closed nondisplaced fracture of lateral malleolus of right fibula    Anal fissure    Arthritis of left knee    Internal hemorrhoids with complication    External hemorrhoids with complication    Class 2 severe obesity due to excess calories with serious comorbidity and body mass index (BMI) of 38.0 to 38.9 in adult    RAMU on CPAP    Polyarthralgia    Chronic left shoulder pain    Arthritis of knee    Osteoarthritis of right knee    H/O total knee replacement     Past Medical History:   Diagnosis Date    Abnormal Pap smear of cervix     Age-related nuclear cataract of both eyes 2020    Anal fissure 10/2021    Anal skin tag 2022    S/P EXCISION    Anesthesia     PT WANTS YOU TO KNOW SHE HAS A PLATE IN HER NECK FROM FUSION    Ankle fracture 2022    RIGHT    Arthrodesis status 2014    Asthma     Benign essential hypertension 2015    Continue lisinopril    Carpal tunnel syndrome     PING    Cervical disc disorder 3/94    Titanium plate    Cervical spondylolysis     Chronic neck pain 2016    Closed nondisplaced fracture of lateral malleolus of right fibula  12/10/2022    NO SURGERY, SEEN AT [facility]    Cystitis 2017    Dry eye syndrome 2020    Environmental allergies     Exogenous hypertriglyceridemia 2017    Fracture, foot     Right    Ganglion cyst of finger 2020    Hemorrhoids     History of COVID-19 2022    History of torn meniscus of left knee     Hormone replacement therapy (HRT)     Hyperchylomicronemia     Hyperlipidemia 2015    ASCVD 10-year risk ~10% Plan: · Start statin. Unclear whether arm numbness really 2/2 statin in the past.    Hyperopia of both eyes 2020    Keratoconjunctivitis sicca 2020    Knee pain, left     Knee swelling 2025    Lichen sclerosus     Lung nodule 2006    Obstructive sleep apnea syndrome 2004    HAS C-PAP MACHINE    RAMU (obstructive sleep apnea) 2024    Home sleep study.  Weight 207 pounds.  Mild RAMU with AHI 8.2 events per hour.  No sleep-related hypoxia.  The patient snored 55% of total monitoring time.    Palpitations     Pertussis 2016    [facility]    PNA (pneumonia)     PONV (postoperative nausea and vomiting)     Presbyopia 2020    Rotator cuff injury 2011    SEEN AT [facility]    Rotator cuff syndrome     Seasonal allergies     Slow to wake up after anesthesia     Snoring     Tear of meniscus of knee     Left    Type 2 diabetes mellitus without complication 2015    Under good control Plan: · Reassured about use of metformin HAS DEXCOM     Past Surgical History:   Procedure Laterality Date    ANKLE OPEN REDUCTION INTERNAL FIXATION      ANTERIOR CERVICAL DISCECTOMY W/ FUSION N/A 2014    C6-7, DR. ALEXEY IZAGUIRRE AT [facility]. TITANIUM PLATE    ANUS SURGERY N/A 2022    Procedure: ANAL TAG EXCISION;  Surgeon: Sarai Perez MD;  Location: [facility] ENDOSCOPY;  Service: General;  Laterality: N/A;  pre-anal tag  post-same    CARPAL TUNNEL RELEASE Bilateral      SECTION          COLONOSCOPY N/A     COLONOSCOPY  N/A 09/19/2022    EXTERNAL AND INTERNAL HEMORRHOIDS, GRADE 3-BANDED, RESCOPE IN 10 YRS, DR. BRADLEY SANTIAGO AT [facility]    HAND SURGERY      Carpal tunnel    HEMORRHOIDECTOMY N/A 09/19/2022    Procedure: HEMORRHOID BANDING x3;  Surgeon: Bradley Santiago MD;  Location: [facility] ENDOSCOPY;  Service: General;  Laterality: N/A;  pre-hemorrhoids  post-same    HERNIA REPAIR      HYSTERECTOMY N/A 1997    WITH BSO, FOR HEAVY BLEEDING    INCISIONAL BREAST BIOPSY Right 1980    BENIGN    JOINT REPLACEMENT  6/23    Knee left    LAPAROSCOPIC LYSIS OF ADHESIONS N/A     NECK SURGERY  4/94    OOPHORECTOMY      OOPHORECTOMY      SPHINCTEROTOMY N/A 02/09/2023    Procedure: EXAM UNDER ANESTHESIA, CHEMICAL SPHINCTEROTOMY WITH BOTOX;  Surgeon: Bradley Santiago MD;  Location: [facility] MAIN OR;  Service: General;  Laterality: N/A;    SPHINCTEROTOMY N/A 11/13/2023    Procedure: chemical sphincterotomy with Botox and hemorrhoidectomy;  Surgeon: Bradley Santiago MD;  Location: [facility] MAIN OR;  Service: General;  Laterality: N/A;    TONSILLECTOMY      TOTAL KNEE ARTHROPLASTY Left 06/20/2023    Procedure: LEFT TOTAL KNEE ARTHROPLASTY WITH JACE NAVIGATION;  Surgeon: Tomás Rasmussen MD;  Location: [facility];  Service: Orthopedics;  Laterality: Left;    TOTAL KNEE ARTHROPLASTY Right 7/24/2025    Procedure: RIGHT TOTAL KNEE ARTHROPLASTY, left shoulder subacromial injection;  Surgeon: Medhat Cantu MD;  Location:  [facility];  Service: Orthopedics;  Laterality: Right;      General Information       Row Name 07/25/25 1442          Physical Therapy Time and Intention    Document Type therapy note (daily note)  -CH     Mode of Treatment individual therapy;physical therapy  -       Row Name 07/25/25 1442          General Information    Patient Profile Reviewed yes  -CH     Existing Precautions/Restrictions fall  -       Row Name 07/25/25 1442          Cognition    Orientation Status (Cognition) oriented x 4  -       Row Name 07/25/25 1442           Safety Issues/Impairments Affecting Functional Mobility    Impairments Affecting Function (Mobility) endurance/activity tolerance;balance;strength;postural/trunk control;range of motion (ROM);pain  -     Comment, Safety Issues/Impairments (Mobility) Gait belt donned, chair follow for ambulation  -               User Key  (r) = Recorded By, (t) = Taken By, (c) = Cosigned By      Initials Name Provider Type     Parag James PT Physical Therapist                   Mobility       Row Name 07/25/25 1443          Bed Mobility    Bed Mobility scooting/bridging;supine-sit;sit-supine  -     Scooting/Bridging [place] (Bed Mobility) standby assist  -     Supine-Sit [place] (Bed Mobility) contact guard  -     Sit-Supine [place] (Bed Mobility) contact guard  -     Assistive Device (Bed Mobility) head of bed elevated;bed rails;leg   -       Row Name 07/25/25 1443          Sit-Stand Transfer    Sit-Stand [place] (Transfers) contact guard;minimum assist (75% patient effort);1 person assist  -     Assistive Device (Sit-Stand Transfers) walker, front-wheeled  -       Row Name 07/25/25 1443          Gait/Stairs (Locomotion)    [place] Level (Gait) 1 person assist;contact guard  -     Assistive Device (Gait) walker, front-wheeled  -     Patient was able to Ambulate yes  -     Distance in Feet (Gait) 125  -     Deviations/Abnormal Patterns (Gait) gait speed decreased;weight shifting decreased;stride length decreased;juan c decreased  -     Bilateral Gait Deviations heel strike decreased;forward flexed posture;weight shift ability decreased  -     [place] Level (Stairs) contact guard;1 person assist;minimum assist (75% patient effort)  -     Handrail Location (Stairs) right side (ascending);left side (descending)  -     Number of Steps (Stairs) 4  -CH     Ascending Technique (Stairs) step-to-step  -     Descending Technique (Stairs) step-to-step  -      Comment, (Gait/Stairs) Pt completes 4 stairs ascending and descending with 1 handrail and CGA from therapist.  -       Row Name 07/25/25 1443          Mobility    Extremity Weight-bearing Status right lower extremity  -     Right Lower Extremity (Weight-bearing Status) weight-bearing as tolerated (WBAT)  -               User Key  (r) = Recorded By, (t) = Taken By, (c) = Cosigned By      Initials Name Provider Type     Parag James PT Physical Therapist                   Obj/Interventions       Row Name 07/25/25 1444          Range of Motion Comprehensive    General Range of Motion bilateral lower extremity ROM WFL;bilateral upper extremity ROM WFL  -               User Key  (r) = Recorded By, (t) = Taken By, (c) = Cosigned By      Initials Name Provider Type     Parag James PT Physical Therapist                   Goals/Plan    No documentation.                  Clinical Impression       Row Name 07/25/25 1445          Pain    Pretreatment Pain Rating 8/10  -     Posttreatment Pain Rating 8/10  -     Pain Location knee  -     Pain Side/Orientation right;lower  -     Pre/Posttreatment Pain Comment Pt reports significant increase in R knee pain today as compared to yesterday due to meds and nerve block wearing off.  -       Row Name 07/25/25 1445          Plan of Care Review    Plan of Care Reviewed With patient;spouse  -     Progress improving  -     Outcome Evaluation Pt alert and oriented x 4 and is agreeable to participate in physical therapy session. She sits up on EOB independently and transfers to standing at rwx with Laura/CGA. Pt ambulates 150 ft in hallway using RWX and CGA and is able to negotiate 4 stairs using 1 handrail with CGA from therapist to ensure safety. Reassured pt was comfortable with post operative preacautions for prolonged positioning of the knee and to be compliant with HEP. Pt is approriate for discharge home with home health PT.  -       Row Name 07/25/25  1445          Therapy Assessment/Plan (PT)    Rehab Potential (PT) good  -     Criteria for Skilled Interventions Met (PT) yes  -CH     Therapy Frequency (PT) daily  -       Row Name 07/25/25 1445          Positioning and Restraints    Pre-Treatment Position in bed  -     Post Treatment Position chair  -CH     In Chair notified nsg;sitting;call light within reach;with family/caregiver;encouraged to call for assist  -               User Key  (r) = Recorded By, (t) = Taken By, (c) = Cosigned By      Initials Name Provider Type     Parag James, PT Physical Therapist                   Outcome Measures       Row Name 07/25/25 1448 07/25/25 0935       How much help from another person do you currently need...    Turning from your back to your side while in flat bed without using bedrails? 4  -CH 4  -LP    Moving from lying on back to sitting on the side of a flat bed without bedrails? 3  -CH 3  -LP    Moving to and from a bed to a chair (including a wheelchair)? 3  -CH 3  -LP    Standing up from a chair using your arms (e.g., wheelchair, bedside chair)? 3  -CH 3  -LP    Climbing 3-5 steps with a railing? 3  -CH 3  -LP    To walk in hospital room? 3  -CH 3  -LP    AM-PAC 6 Clicks Score (PT) 19  -CH 19  -LP    Highest Level of Mobility Goal Walk 10 Steps or More-6  -CH Walk 10 Steps or More-6  -LP      Row Name 07/25/25 1448          Functional Assessment    Outcome Measure Options AM-PAC 6 Clicks Basic Mobility (PT)  -               User Key  (r) = Recorded By, (t) = Taken By, (c) = Cosigned By      Initials Name Provider Type     Airam Caraballo, RN Registered Nurse     Parag James, PT Physical Therapist                                 Physical Therapy Education       Title: PT OT SLP Therapies (Resolved)       Topic: Physical Therapy (Resolved)       Point: Mobility training (Resolved)       Learning Progress Summary            Patient Acceptance, E, DU,NR,VU by  at 7/24/2025 7257                       Point: Home exercise program (Resolved)       Learning Progress Summary            Patient Acceptance, E, DU,NR,VU by  at 7/24/2025 1624                      Point: Body mechanics (Resolved)       Learning Progress Summary            Patient Acceptance, E, DU,NR,VU by  at 7/24/2025 1624                      Point: Precautions (Resolved)       Learning Progress Summary            Patient Acceptance, E, DU,NR,VU by  at 7/24/2025 1624                                      User Key       Initials Effective Dates Name Provider Type [facility] 09/11/24 -  Kirit Pham, PT Physical Therapist PT                  PT Recommendation and Plan     Progress: improving  Outcome Evaluation: Pt alert and oriented x 4 and is agreeable to participate in physical therapy session. She sits up on EOB independently and transfers to standing at rwx with Laura/CGA. Pt ambulates 150 ft in hallway using RWX and CGA and is able to negotiate 4 stairs using 1 handrail with CGA from therapist to ensure safety. Reassured pt was comfortable with post operative preacautions for prolonged positioning of the knee and to be compliant with HEP. Pt is approriate for discharge home with home health PT.     Time Calculation:         PT Charges       Row Name 07/25/25 1449             Time Calculation    Start Time 0204  -CH      Stop Time 0234  -      Time Calculation (min) 30 min  -CH      PT Received On 07/25/25  -      PT - Next Appointment 07/27/25  -         Timed Charges    62005 - PT Therapeutic Exercise Minutes 15  -CH      76600 - PT Therapeutic Activity Minutes 15  -CH         Total Minutes    Timed Charges Total Minutes 30  -CH       Total Minutes 30  -CH                User Key  (r) = Recorded By, (t) = Taken By, (c) = Cosigned By      Initials Name Provider Type    Parag Koenig, PT Physical Therapist                  Therapy Charges for Today       Code Description Service Date Service Provider Modifiers Qty     [id number]  PT THER PROC EA 15 MIN 7/25/2025 Parag James, PT GP 1    [id number]  PT THERAPEUTIC ACT EA 15 MIN 7/25/2025 Parag James, PT GP 1            PT G-Codes  Outcome Measure Options: AM-PAC 6 Clicks Basic Mobility (PT)  AM-PAC 6 Clicks Score (PT): 19  PT Discharge Summary  Anticipated Discharge Disposition (PT): home, home with home health    Parag James, PT  7/25/2025

## 2025-07-25 NOTE — PLAN OF CARE
Goal Outcome Evaluation:  Plan of Care Reviewed With: patient, spouse        Progress: improving  Outcome Evaluation: Pt alert and oriented x 4 and is agreeable to participate in physical therapy session. She sits up on EOB independently and transfers to standing at rwx with Laura/CGA. Pt ambulates 150 ft in hallway using RWX and CGA and is able to negotiate 4 stairs using 1 handrail with CGA from therapist to ensure safety. Reassured pt was comfortable with post operative preacautions for prolonged positioning of the knee and to be compliant with HEP. Pt is approriate for discharge home with home health PT.    Anticipated Discharge Disposition (PT): home, home with home health

## 2025-07-25 NOTE — PLAN OF CARE
Goal Outcome Evaluation:              Outcome Evaluation: Pt is AOx4. Pt is on RA. PT is assist x1 to bathroom. Pt has tomeka dressing in place. Pt has PRN pain meds avalible. Pt expected to discharge today with HH.

## 2025-07-25 NOTE — DISCHARGE SUMMARY
Date of Admission:  7/24/2025    Date of Discharge:  7/25/2025    Discharge Diagnosis: s/p Right total knee arthroplasty    Admitting Physician: Medhat Cantu    Consults: none    DETAILS OF HOSPITAL STAY:  Patient is a 65 y.o. female was admitted to the floor following a total knee arthroplasty.  Post-operatively the patient was transferred to the post-operative floor where the patient underwent physical therapy that included active as well as passive ROM exercises. Opioids were titrated to achieve appropriate pain management to allow for participation in mobilization exercises. Vital signs are now stable. The incision is benign without signs or symptoms of infection. Operative extremity neurovascular status remains intact. Appropriate education re: incision care, activity levels, medications, and follow up visits was completed and all questions were answered. The patient is now deemed stable for discharge to home.    Condition on Discharge:  Stable    Discharge Medications     Discharge Medications        New Medications        Instructions Start Date   Aspirin Low Dose 81 MG EC tablet  Generic drug: aspirin   Take 1 tablet by mouth Every 12 (Twelve) Hours.      docusate sodium 100 MG capsule  Commonly known as: COLACE   100 mg, Oral, 2 Times Daily      HYDROcodone-acetaminophen 7.5-325 MG per tablet  Commonly known as: NORCO   Take 1 tablet by mouth Every 6 (Six) Hours As Needed for Moderate Pain.      ondansetron ODT 4 MG disintegrating tablet  Commonly known as: ZOFRAN-ODT   4 mg, Sublingual, Every 6 Hours PRN             Changes to Medications        Instructions Start Date   amLODIPine 5 MG tablet  Commonly known as: NORVASC  What changed: when to take this   5 mg, Oral, Daily      olmesartan 40 MG tablet  Commonly known as: BENICAR  What changed: additional instructions   40 mg, Oral, Daily             Continue These Medications        Instructions Start Date   acetaminophen 500 MG tablet  Commonly known  as: TYLENOL   1 tablet, Every 6 Hours PRN      albuterol sulfate  (90 Base) MCG/ACT inhaler  Commonly known as: PROVENTIL HFA;VENTOLIN HFA;PROAIR HFA   2 puffs, Every 4 Hours PRN      cloNIDine 0.1 MG tablet  Commonly known as: CATAPRES   Take 1 tablet by mouth 2 (Two) Times a Day As Needed for High Blood Pressure (Systolic BP > 180, Diastolic BP > 110).      estradiol 0.05 MG/24HR patch  Commonly known as: MINIVELLE, VIVELLE-DOT   1 patch, 2 Times Weekly      estradiol 10 MCG tablet vaginal tablet  Commonly known as: VAGIFEM   1 tablet 2 (Two) Times a Week.      OneTouch Delica Plus [id number] misc   1 each, Other, Daily      OneTouch Ultra test strip  Generic drug: glucose blood   1 each, Other, Daily      Ozempic (0.25 or 0.5 MG/DOSE) 2 MG/3ML solution pen-injector  Generic drug: Semaglutide(0.25 or 0.5MG/DOS)   0.5 mg 1 (One) Time Per Week. TAKES ON THURSDAY, INSTRUCTED TO STOP 7 DAYS BEFORE SURGERY      rosuvastatin 5 MG tablet  Commonly known as: CRESTOR   5 mg, Oral, Daily      Synjardy XR 5-1000 MG tablet sustained-release 24 hour  Generic drug: Empagliflozin-metFORMIN HCl ER   1 tablet, Daily             Stop These Medications      traMADol 50 MG tablet  Commonly known as: ULTRAM              Discharge Diet: regular    Activity at Discharge: as tolerated    Discharge Instructions:   1)  Patient is to continue with physical therapy exercises daily and continue working with the physical therapist as ordered.  2)  Continue to follow precautions as instructed.   3)  Patient may weight bear as tolerated.   4)  Continue to ice regularly. Patient instructed on frequent calf pumping exercises.  Patient also instructed on incentive spirometer during hospitalization and encouraged to continue to use at home regularly.   5)  Patient is instructed to continue DVT prophylaxis.  6)  The dressing should be left in place. If waterproof dressing is intact the patient may shower immediately following discharge. If the  dressing becomes dislodged or saturated it should be changed and patient must wait until POD #5 to shower. If dressing is changed, apply dry sterile dressing after showering.  7)  Follow up appointment in 2 weeks    Follow-up Appointments  2 weeks with Dr Pepe Small, [facility]  07/25/25  10:25 EDT

## 2025-07-25 NOTE — CASE MANAGEMENT/SOCIAL WORK
Continued Stay Note  [facility]     Patient Name: Alisha Connolly  MRN: [id number]  Today's Date: 7/25/2025    Admit Date: 7/24/2025    Plan: Home with [facility]   Discharge Plan       Row Name 07/25/25 0957       Plan    Plan Home with [facility]    Patient/Family in Agreement with Plan yes    Provided Post Acute Provider List? Refused    Plan Comments [facility] following for home health/DME set up. Patient chose and is agreeable to [facility] health and referral accepted. Agency representative, Yakelin, notified and aware of D/C date. Patient denied any DME needs at this time.                   Discharge Codes    No documentation.                 Expected Discharge Date and Time       Expected Discharge Date Expected Discharge Time    Jul 25, 2025               Yakelin Hollins RN

## 2025-07-25 NOTE — OUTREACH NOTE
Prep Survey      Flowsheet Row Responses   [facility] patient discharged from? [place]   Is LACE score < 7 ? No   Eligibility [facility]   Date of Admission 07/24/25   Date of Discharge 07/25/25   Discharge diagnosis s/p Right total knee arthroplasty   Does the patient have one of the following disease processes/diagnoses(primary or secondary)? Total Joint Replacement   Does the patient have Home health ordered? Yes   What is the Home health agency?  [facility] [place]   Prep survey completed? Yes            Marisel JOHNSON - Registered Nurse

## 2025-07-25 NOTE — CASE MANAGEMENT/SOCIAL WORK
Case Management Discharge Note      Final Note: Home with [facility] via private vehicle.    Provided Post Acute Provider List?: Refused    Selected Continued Care - Discharged on 7/25/2025 Admission date: 7/24/2025 - Discharge disposition: Home or Self Care      Destination    No services have been selected for the patient.                Durable Medical Equipment    No services have been selected for the patient.                Dialysis/Infusion    No services have been selected for the patient.                Home Medical Care Coordination complete.      Service Provider Services Address Phone Fax Patient Preferred    [facility] [address] [place] [address], [facility] [address] [address] [phone] --              Therapy    No services have been selected for the patient.                Community Resources    No services have been selected for the patient.                Community & DME    No services have been selected for the patient.                    Transportation Services  Transportation: Private Transportation  Private: Car    Final Discharge Disposition Code: 06 - home with home health care

## 2025-07-28 ENCOUNTER — TRANSITIONAL CARE MANAGEMENT TELEPHONE ENCOUNTER (OUTPATIENT)
Dept: CALL CENTER | Facility: HOSPITAL | Age: 65
End: 2025-07-28
Payer: MEDICARE

## 2025-07-28 NOTE — OUTREACH NOTE
Call Center TCM Note      Flowsheet Row Responses   [facility] patient discharged from? [place]   Does the patient have one of the following disease processes/diagnoses(primary or secondary)? Total Joint Replacement   Joint surgery performed? Knee   TCM attempt successful? Yes   Call start time 1307   Call end time 1312   Discharge diagnosis s/p Right total knee arthroplasty   Person spoke with today (if not patient) and relationship Patient   Does the patient have all medications related to this admission filled (includes all antibiotics, pain medications, etc.) Yes   Is the patient taking all medications as directed (includes completed medication regime)? Yes   Does the patient have an appointment with their PCP within 7-14 days of discharge? Other   Nursing Interventions Patient desires to follow up with specialty only   What is the Home health agency?  [facility]   Has home health visited the patient within 72 hours of discharge? Yes   Psychosocial issues? No   When is the first therapy visit scheduled (PO Day) including how many days per week  Patient has started in home PT   Has the patient began therapy sessions (either in the home or as an out patient)? Yes   Does the patient have a wound vac in place? N/A   Has the patient fallen since discharge? No   Did the patient receive a copy of their discharge instructions? Yes   Nursing interventions Reviewed instructions with patient   What is the patient's perception of their functional status since discharge? Improving   Is the patient able to teach back signs and symptoms of infection? Temp >100.4 for 24h or longer, Incisional drainage, Increased swelling or redness around incision (not associated with surgical edema)   Is the patient able to teach back signs and symptoms of DVT? Redness in calf, Area hot to touch, Swelling in calf, Severe pain in calf   If the patient is a current smoker, are they able to teach back resources  for cessation? Not a smoker   Is the patient/caregiver able to teach back the hierarchy of who to call/visit for symptoms/problems? PCP, Specialist, Home health nurse, Urgent Care, ED, 911 Yes   TCM call completed? Yes   Wrap up additional comments Doing well. Has all medications, and participating in PT.   Call end time 1312   Would this patient benefit from a Referral to Amb Social Work? No   Is the patient interested in additional calls from an ambulatory ? No            Te BETANCUR - Registered Nurse    7/28/2025, 13:12 EDT

## 2025-07-30 DIAGNOSIS — Z96.651 S/P TKR (TOTAL KNEE REPLACEMENT), RIGHT: ICD-10-CM

## 2025-07-30 RX ORDER — HYDROCODONE BITARTRATE AND ACETAMINOPHEN 7.5; 325 MG/1; MG/1
1 TABLET ORAL EVERY 6 HOURS PRN
Qty: 28 TABLET | Refills: 0 | Status: SHIPPED | OUTPATIENT
Start: 2025-07-30 | End: 2025-08-08

## 2025-07-30 NOTE — TELEPHONE ENCOUNTER
Caller: Alisha Connolly    Relationship: Self    Best call back number:     Requested Prescriptions:   HYDROcodone-acetaminophen (NORCO) 7.5-325 MG per tablet     Pharmacy where request should be sent:    [place] [id number]   Phone: [phone] Fax: [phone]   Hours: Monday to Friday 7 AM to 6 PM, Saturday & Sunday 8 AM to 4:30 PM (Closed 12 PM to 12:30 PM)       Last office visit with prescribing clinician: 5/21/2025   Last telemedicine visit with prescribing clinician: Visit date not found   Next office visit with prescribing clinician: 8/8/2025     Additional details provided by patient: SHE IS LOW ON MEDICATION AND IN PAIN PLEAE CALL WHEN SENT      Does the patient have less than a 3 day supply:  [x] Yes  [] No    Would you like a call back once the refill request has been completed: [x] Yes [] No    If the office needs to give you a call back, can they leave a voicemail: [x] Yes [] No    Francisco Solis Rep   07/30/25 13:10 EDT

## 2025-08-07 DIAGNOSIS — Z96.651 S/P TKR (TOTAL KNEE REPLACEMENT), RIGHT: ICD-10-CM

## 2025-08-07 RX ORDER — HYDROCODONE BITARTRATE AND ACETAMINOPHEN 7.5; 325 MG/1; MG/1
1 TABLET ORAL EVERY 6 HOURS PRN
Qty: 28 TABLET | Refills: 0 | Status: SHIPPED | OUTPATIENT
Start: 2025-08-07 | End: 2025-08-16

## 2025-08-08 ENCOUNTER — OFFICE VISIT (OUTPATIENT)
Dept: ORTHOPEDIC SURGERY | Facility: CLINIC | Age: 65
End: 2025-08-08
Payer: MEDICARE

## 2025-08-08 VITALS — TEMPERATURE: 98 F | BODY MASS INDEX: 34.48 KG/M2 | HEIGHT: 63 IN | WEIGHT: 194.6 LBS

## 2025-08-08 DIAGNOSIS — Z09 SURGERY FOLLOW-UP: Primary | ICD-10-CM

## 2025-08-08 RX ORDER — METHYLPREDNISOLONE 4 MG/1
TABLET ORAL
Qty: 21 TABLET | Refills: 0 | Status: SHIPPED | OUTPATIENT
Start: 2025-08-08

## 2025-08-08 RX ORDER — CYCLOBENZAPRINE HCL 10 MG
10 TABLET ORAL NIGHTLY PRN
Qty: 30 TABLET | Refills: 0 | Status: SHIPPED | OUTPATIENT
Start: 2025-08-08

## 2025-08-11 ENCOUNTER — READMISSION MANAGEMENT (OUTPATIENT)
Dept: CALL CENTER | Facility: HOSPITAL | Age: 65
End: 2025-08-11
Payer: MEDICARE

## 2025-08-11 ENCOUNTER — TREATMENT (OUTPATIENT)
Dept: PHYSICAL THERAPY | Facility: CLINIC | Age: 65
End: 2025-08-11
Payer: MEDICARE

## 2025-08-11 ENCOUNTER — TELEPHONE (OUTPATIENT)
Dept: ORTHOPEDIC SURGERY | Facility: CLINIC | Age: 65
End: 2025-08-11
Payer: MEDICARE

## 2025-08-11 DIAGNOSIS — R29.898 WEAKNESS OF RIGHT LEG: ICD-10-CM

## 2025-08-11 DIAGNOSIS — M25.661 KNEE STIFFNESS, RIGHT: ICD-10-CM

## 2025-08-11 DIAGNOSIS — Z96.651 S/P TKR (TOTAL KNEE REPLACEMENT), RIGHT: Primary | ICD-10-CM

## 2025-08-11 DIAGNOSIS — R26.9 ABNORMAL GAIT: ICD-10-CM

## 2025-08-11 DIAGNOSIS — Z74.09 IMPAIRED FUNCTIONAL MOBILITY, BALANCE, GAIT, AND ENDURANCE: ICD-10-CM

## 2025-08-14 ENCOUNTER — TREATMENT (OUTPATIENT)
Dept: PHYSICAL THERAPY | Facility: CLINIC | Age: 65
End: 2025-08-14
Payer: MEDICARE

## 2025-08-14 DIAGNOSIS — R26.9 ABNORMAL GAIT: ICD-10-CM

## 2025-08-14 DIAGNOSIS — R29.898 WEAKNESS OF RIGHT LEG: ICD-10-CM

## 2025-08-14 DIAGNOSIS — Z74.09 IMPAIRED FUNCTIONAL MOBILITY, BALANCE, GAIT, AND ENDURANCE: ICD-10-CM

## 2025-08-14 DIAGNOSIS — Z96.651 S/P TKR (TOTAL KNEE REPLACEMENT), RIGHT: Primary | ICD-10-CM

## 2025-08-14 DIAGNOSIS — M25.661 KNEE STIFFNESS, RIGHT: ICD-10-CM

## 2025-08-14 DIAGNOSIS — Z96.651 S/P TKR (TOTAL KNEE REPLACEMENT), RIGHT: ICD-10-CM

## 2025-08-14 PROCEDURE — 97110 THERAPEUTIC EXERCISES: CPT | Performed by: PHYSICAL THERAPIST

## 2025-08-14 PROCEDURE — 97530 THERAPEUTIC ACTIVITIES: CPT | Performed by: PHYSICAL THERAPIST

## 2025-08-14 PROCEDURE — 97112 NEUROMUSCULAR REEDUCATION: CPT | Performed by: PHYSICAL THERAPIST

## 2025-08-14 PROCEDURE — 97140 MANUAL THERAPY 1/> REGIONS: CPT | Performed by: PHYSICAL THERAPIST

## 2025-08-14 RX ORDER — HYDROCODONE BITARTRATE AND ACETAMINOPHEN 7.5; 325 MG/1; MG/1
1 TABLET ORAL EVERY 8 HOURS PRN
Qty: 28 TABLET | Refills: 0 | Status: SHIPPED | OUTPATIENT
Start: 2025-08-14 | End: 2025-08-23

## 2025-08-18 ENCOUNTER — READMISSION MANAGEMENT (OUTPATIENT)
Dept: CALL CENTER | Facility: HOSPITAL | Age: 65
End: 2025-08-18
Payer: MEDICARE

## 2025-08-21 ENCOUNTER — TREATMENT (OUTPATIENT)
Dept: PHYSICAL THERAPY | Facility: CLINIC | Age: 65
End: 2025-08-21
Payer: MEDICARE

## 2025-08-21 DIAGNOSIS — R26.9 ABNORMAL GAIT: ICD-10-CM

## 2025-08-21 DIAGNOSIS — R29.898 WEAKNESS OF RIGHT LEG: ICD-10-CM

## 2025-08-21 DIAGNOSIS — Z96.651 S/P TKR (TOTAL KNEE REPLACEMENT), RIGHT: Primary | ICD-10-CM

## 2025-08-21 DIAGNOSIS — M25.661 KNEE STIFFNESS, RIGHT: ICD-10-CM

## 2025-08-21 DIAGNOSIS — Z74.09 IMPAIRED FUNCTIONAL MOBILITY, BALANCE, GAIT, AND ENDURANCE: ICD-10-CM

## 2025-08-25 ENCOUNTER — TREATMENT (OUTPATIENT)
Dept: PHYSICAL THERAPY | Facility: CLINIC | Age: 65
End: 2025-08-25
Payer: MEDICARE

## 2025-08-25 DIAGNOSIS — R29.898 WEAKNESS OF RIGHT LEG: ICD-10-CM

## 2025-08-25 DIAGNOSIS — Z74.09 IMPAIRED FUNCTIONAL MOBILITY, BALANCE, GAIT, AND ENDURANCE: ICD-10-CM

## 2025-08-25 DIAGNOSIS — M25.661 KNEE STIFFNESS, RIGHT: ICD-10-CM

## 2025-08-25 DIAGNOSIS — Z96.651 S/P TKR (TOTAL KNEE REPLACEMENT), RIGHT: Primary | ICD-10-CM

## 2025-08-25 DIAGNOSIS — R26.9 ABNORMAL GAIT: ICD-10-CM

## 2025-08-25 PROCEDURE — 97110 THERAPEUTIC EXERCISES: CPT | Performed by: PHYSICAL THERAPIST

## 2025-08-25 PROCEDURE — 97140 MANUAL THERAPY 1/> REGIONS: CPT | Performed by: PHYSICAL THERAPIST

## 2025-08-25 PROCEDURE — 97530 THERAPEUTIC ACTIVITIES: CPT | Performed by: PHYSICAL THERAPIST

## 2025-08-28 ENCOUNTER — TREATMENT (OUTPATIENT)
Dept: PHYSICAL THERAPY | Facility: CLINIC | Age: 65
End: 2025-08-28
Payer: MEDICARE

## 2025-08-28 DIAGNOSIS — M25.661 KNEE STIFFNESS, RIGHT: ICD-10-CM

## 2025-08-28 DIAGNOSIS — R29.898 WEAKNESS OF RIGHT LEG: ICD-10-CM

## 2025-08-28 DIAGNOSIS — Z96.651 S/P TKR (TOTAL KNEE REPLACEMENT), RIGHT: Primary | ICD-10-CM

## 2025-08-28 DIAGNOSIS — R26.9 ABNORMAL GAIT: ICD-10-CM

## 2025-08-28 DIAGNOSIS — Z74.09 IMPAIRED FUNCTIONAL MOBILITY, BALANCE, GAIT, AND ENDURANCE: ICD-10-CM

## (undated) DEVICE — PENCL ES MEGADINE EZ/CLEAN BUTN W/HOLSTR 10FT

## (undated) DEVICE — LOU MINOR PROCEDURE: Brand: MEDLINE INDUSTRIES, INC.

## (undated) DEVICE — TRAP FLD MINIVAC MEGADYNE 100ML

## (undated) DEVICE — SENSR O2 OXIMAX FNGR A/ 18IN NONSTR

## (undated) DEVICE — SPNG GZ WOVN 4X4IN 12PLY 10/BX STRL

## (undated) DEVICE — GOWN,SIRUS,NON REINFRCD,LARGE,SET IN SL: Brand: MEDLINE

## (undated) DEVICE — PREP SOL POVIDONE/IODINE BT 4OZ

## (undated) DEVICE — ZIP 24 SURGICAL SKIN CLOSURE DEVICE, PSA: Brand: ZIP 24 SURGICAL SKIN CLOSURE DEVICE

## (undated) DEVICE — DRAPE,UTILITY,TAPE,15X26,STERILE: Brand: MEDLINE

## (undated) DEVICE — INTENDED TO SUPPORT AND MAINTAIN THE POSITION OF AN ANESTHETIZED PATIENT DURING SURGERY: Brand: HERMANTOR XL PINK KNEE POSITIONING PAD

## (undated) DEVICE — ANTIBACTERIAL UNDYED BRAIDED (POLYGLACTIN 910), SYNTHETIC ABSORBABLE SUTURE: Brand: COATED VICRYL

## (undated) DEVICE — LN SMPL CO2 SHTRM SD STREAM W/M LUER

## (undated) DEVICE — SMOKE EVACUATION TUBING WITH 8 IN INTEGRAL WAND AND SPONGE GUARD: Brand: BUFFALO FILTER

## (undated) DEVICE — PATIENT RETURN ELECTRODE, SINGLE-USE, CONTACT QUALITY MONITORING, ADULT, WITH 9FT CORD, FOR PATIENTS WEIGING OVER 33LBS. (15KG): Brand: MEGADYNE

## (undated) DEVICE — PANTY KNIT MATERN L/XL

## (undated) DEVICE — KT ORCA ORCAPOD DISP STRL

## (undated) DEVICE — GLV SURG SENSICARE PI PF LF 7 GRN STRL

## (undated) DEVICE — NDL HYPO ECLPS SFTY 22G 1 1/2IN

## (undated) DEVICE — GLV SURG SENSICARE PI LF PF 7.5 GRN STRL

## (undated) DEVICE — BNDG,ELSTC,MATRIX,STRL,6"X5YD,LF,HOOK&LP: Brand: MEDLINE

## (undated) DEVICE — GLV SURG SENSICARE PI MIC PF SZ7 LF STRL

## (undated) DEVICE — SUT VIC 2/0 CT2 27IN J269H

## (undated) DEVICE — GOWN SURG AERO CHROME XL

## (undated) DEVICE — STERILE LATEX POWDER-FREE SURGICAL GLOVESWITH NITRILE COATING: Brand: PROTEXIS

## (undated) DEVICE — CANN O2 ETCO2 FITS ALL CONN CO2 SMPL A/ 7IN DISP LF

## (undated) DEVICE — GLV SURG SIGNATURE ESSENTIAL PF LTX SZ8.5

## (undated) DEVICE — GLV SURG BIOGEL LTX PF 8 1/2

## (undated) DEVICE — STRAP STIRUP WO/ RNG

## (undated) DEVICE — PANTY KNIT WASHABLE LG/XL BRN/GRN LF

## (undated) DEVICE — TUBING, SUCTION, 1/4" X 10', STRAIGHT: Brand: MEDLINE

## (undated) DEVICE — LEGGINGS, PAIR, CLEAR, STERILE: Brand: MEDLINE

## (undated) DEVICE — Device

## (undated) DEVICE — GLV SURG BIOGEL LTX PF 6 1/2

## (undated) DEVICE — SOL ISO/ALC 70PCT 4OZ

## (undated) DEVICE — CEMENT MIXING SYSTEM WITH FEMORAL BREAKWAY NOZZLE: Brand: REVOLUTION

## (undated) DEVICE — SOL NACL 0.9PCT 1000ML

## (undated) DEVICE — DECANTER BAG 9": Brand: MEDLINE INDUSTRIES, INC.

## (undated) DEVICE — APPL CHLORAPREP HI/LITE 26ML ORNG

## (undated) DEVICE — PK KN TOTL 40

## (undated) DEVICE — DUAL CUT SAGITTAL BLADE

## (undated) DEVICE — SYS IRR SURGIPHOR A/MIC RTU BO PVPI 450ML STRL

## (undated) DEVICE — ADAPT CLN BIOGUARD AIR/H2O DISP

## (undated) DEVICE — SKIN PREP TRAY W/CHG: Brand: MEDLINE INDUSTRIES, INC.

## (undated) DEVICE — SOL IRR NACL 0.9PCT 3000ML

## (undated) DEVICE — NEEDLE, QUINCKE, 20GX3.5": Brand: MEDLINE